# Patient Record
Sex: FEMALE | Race: WHITE | NOT HISPANIC OR LATINO | URBAN - METROPOLITAN AREA
[De-identification: names, ages, dates, MRNs, and addresses within clinical notes are randomized per-mention and may not be internally consistent; named-entity substitution may affect disease eponyms.]

---

## 2018-12-26 ENCOUNTER — EMERGENCY (EMERGENCY)
Facility: HOSPITAL | Age: 83
LOS: 1 days | Discharge: HOME | End: 2018-12-26
Attending: EMERGENCY MEDICINE
Payer: MEDICARE

## 2018-12-26 VITALS
HEIGHT: 64 IN | RESPIRATION RATE: 18 BRPM | HEART RATE: 87 BPM | WEIGHT: 106.92 LBS | DIASTOLIC BLOOD PRESSURE: 81 MMHG | TEMPERATURE: 98 F | SYSTOLIC BLOOD PRESSURE: 128 MMHG | OXYGEN SATURATION: 96 %

## 2018-12-26 PROCEDURE — 99284 EMERGENCY DEPT VISIT MOD MDM: CPT

## 2018-12-26 NOTE — ED ADULT TRIAGE NOTE - CHIEF COMPLAINT QUOTE
right foot pain x 3 weeks and worsening with pain and swelling; no injuries; seen in urgent care; xray reportedly negative; doppler reportedly negative for clots; comes in due to worsening pain; unable to walk

## 2018-12-27 VITALS
OXYGEN SATURATION: 97 % | TEMPERATURE: 97 F | HEART RATE: 76 BPM | DIASTOLIC BLOOD PRESSURE: 63 MMHG | RESPIRATION RATE: 16 BRPM | SYSTOLIC BLOOD PRESSURE: 139 MMHG

## 2018-12-27 PROCEDURE — 93971 EXTREMITY STUDY: CPT | Mod: 26

## 2018-12-27 PROCEDURE — 99284 EMERGENCY DEPT VISIT MOD MDM: CPT | Mod: 25

## 2018-12-27 PROCEDURE — 93971 EXTREMITY STUDY: CPT

## 2018-12-27 RX ORDER — CEPHALEXIN 500 MG
1 CAPSULE ORAL
Qty: 40 | Refills: 0
Start: 2018-12-27 | End: 2019-01-05

## 2018-12-27 RX ORDER — CEPHALEXIN 500 MG
500 CAPSULE ORAL ONCE
Qty: 0 | Refills: 0 | Status: COMPLETED | OUTPATIENT
Start: 2018-12-27 | End: 2018-12-27

## 2018-12-27 RX ORDER — ACETAMINOPHEN 500 MG
650 TABLET ORAL ONCE
Qty: 0 | Refills: 0 | Status: COMPLETED | OUTPATIENT
Start: 2018-12-27 | End: 2018-12-27

## 2018-12-27 RX ADMIN — Medication 500 MILLIGRAM(S): at 00:48

## 2018-12-27 RX ADMIN — Medication 650 MILLIGRAM(S): at 00:48

## 2018-12-27 NOTE — ED PROVIDER NOTE - MEDICAL DECISION MAKING DETAILS
erythema, swelling, us, abx Swelling with erythema of right lower extremity.  No systemic constitutional symptoms. Patient has no DVT risk factors. no s/s of PE.  Well appearing.  Patient started on  Keflex antibiotics for possible cellulitis. She has no MRSA risk factors and cellulitis is non-purulent.  Ultrasound negative for DVT. Patient is ambulating around the ear. Appear safe to be discharged. Patient was discharged with instructions to  Take a course of Keflex, Motrin and Tylenol for pain, and follow up with PMD in 1-2 days for repeat evaluation and further management.    The patient was discharged from the ED in stable condition. All results of today's workup were discussed with the patient and all questions/concerns were addressed. All discharge instructions were thoroughly discussed with the patient, as well as important warning signs and new/ worsening symptoms which should necessitate patient's immediate return to the ED. The patient is agreeable with discharge and expresses full understanding of all instructions given.

## 2018-12-27 NOTE — ED ADULT NURSE NOTE - OBJECTIVE STATEMENT
86 y/o female presents to ED via wheelchair, a&ox3 c/o foot pain. Patient reports swelling to right foot has been getting progressively worse for the past 1-2 weeks, more so within the past 2 days. Also c/o worsening redness and pain. States pain is mild at rest but increased with movement or bearing weight on foot. Patient went to PCP and received x-ray/doppler of extremity to r/o clot which was negative. Denies N/V, fever, SOB, CP, cough, recent travel or any other complaints. Right foot appears red, warm to touch. +pedal pulses, +sensation. Full ROM in extremity.  MD at bedside for eval.

## 2018-12-27 NOTE — ED PROVIDER NOTE - OBJECTIVE STATEMENT
86 yo F presents with 1-2 weeks of RLE swelling with 2 days of increased swelling associated with erythema and warmth to touch. mild pain at rest, increased with any use of her foot. no injury. feels like the area of warmth, erythema is increased in size. 2 weeks ago, at the start of the symptoms, she had xray and US which were negative for fx or dvt.  no f/ch, cp, sob, abd pain, N/V/D.

## 2018-12-27 NOTE — ED PROVIDER NOTE - SKIN, MLM
varicose veins of bl LE. diameter of RLE is 1.5 that of LLE. compartments are soft. there is 1+ pitting edema of RLE. mild erythema and warmth to touch of entire R foot extending to very distal tib/fib area. 2+  palpable DP pulses in bl LE. neurovascular status is normal. . extremely dry cracked skin. varicose veins of bl LE. diameter of RLE is 1.5 that of LLE. compartments are soft. there is 1+ pitting edema of RLE. mild erythema and warmth to touch of entire R foot extending to very distal tib/fib area. 2+  palpable DP pulses in bl LE. neurovascular status is normal. .

## 2018-12-27 NOTE — ED ADULT NURSE NOTE - NSIMPLEMENTINTERV_GEN_ALL_ED
Implemented All Fall Risk Interventions:  [place] to call system. Call bell, personal items and telephone within reach. Instruct patient to call for assistance. Room bathroom lighting operational. Non-slip footwear when patient is off stretcher. Physically safe environment: no spills, clutter or unnecessary equipment. Stretcher in lowest position, wheels locked, appropriate side rails in place. Provide visual cue, wrist band, yellow gown, etc. Monitor gait and stability. Monitor for mental status changes and reorient to person, place, and time. Review medications for side effects contributing to fall risk. Reinforce activity limits and safety measures with patient and family.

## 2018-12-27 NOTE — ED PROVIDER NOTE - NSFOLLOWUPINSTRUCTIONS_ED_ALL_ED_FT
Take antibiotic as prescribed. Follow-up with your primary doctor within 1-2 days. Return to an ER for any concerns.

## 2019-01-07 PROBLEM — Z00.00 ENCOUNTER FOR PREVENTIVE HEALTH EXAMINATION: Status: ACTIVE | Noted: 2019-01-07

## 2019-01-09 ENCOUNTER — APPOINTMENT (OUTPATIENT)
Dept: ORTHOPEDIC SURGERY | Facility: CLINIC | Age: 84
End: 2019-01-09
Payer: MEDICARE

## 2019-01-09 DIAGNOSIS — M79.604 PAIN IN RIGHT LEG: ICD-10-CM

## 2019-01-09 DIAGNOSIS — I99.8 OTHER DISORDER OF CIRCULATORY SYSTEM: ICD-10-CM

## 2019-01-09 DIAGNOSIS — G90.521 COMPLEX REGIONAL PAIN SYNDROME I OF RIGHT LOWER LIMB: ICD-10-CM

## 2019-01-09 PROCEDURE — 99203 OFFICE O/P NEW LOW 30 MIN: CPT

## 2019-01-09 PROCEDURE — 73630 X-RAY EXAM OF FOOT: CPT | Mod: LT

## 2019-01-09 PROCEDURE — 73590 X-RAY EXAM OF LOWER LEG: CPT | Mod: LT

## 2019-01-09 NOTE — DISCUSSION/SUMMARY
[de-identified] : Discussed with patient and family member potential nature of condition, course / sequelae, options reviewed.  NB shoe wear, low impact activity, Pain Management referral for evaluation and treatment, as well as vascular surgery assessment.  Educational handout provided.  Return to office PRN, all questions answered.

## 2019-01-09 NOTE — PHYSICAL EXAM
[de-identified] : Extremity: +Equinus (releases) R LE, skin intact without trophic changes nor erythema R LE, diffuse skin hypersensitivity R distal leg and foot.  Nontender R pretibial, syndesmosis, Achilles, ankle.  Calves soft and nontender, residual dysesthesias R LE, absent pedal pulses R LE.  AOx3, mood / affect normal. [de-identified] : MRI R foot (12/31/18 by report): impression: diffuse soft tissue edema is present, predominantly on the dorsum of the foot. Degenerative changes, without evidence for discrete fractures. Radiographs (3v R foot and 2v R tibia / fibula) reveal vascular calcifications R LE, Ureña's R foot, plantar calcaneal enthesophyte R hindfoot.

## 2019-01-09 NOTE — HISTORY OF PRESENT ILLNESS
[Sneakers] : anderson [[email or web address]] : 87 year female presents for evaluation of R foot/ankle pain x 10/24/18. Pt denies any recent injuries to R foot/ankle. Pt had seen podiatrist who ordered xrays and US which was negative. Pt went to ER where she was treated with oral abx. Pt had seen her PCP who ordered MRI which was negative and referred her. Pt states the pain is generalized around R foot/ankle/distal leg. Pt reports redness/warmth on R distal LE as well as associated soft tissue swelling, and states antecedent history of R knee fracture (May 2018 in [place]) which was treated non-operatively. Pt currently have no pain today. Pt denies any numbness/tingling. Pt limps with ambulation. Pt is using cane for ambulation. Denies additional musculoskeletal complaints referable to foot/ankle.\par

## 2020-03-03 ENCOUNTER — INPATIENT (INPATIENT)
Facility: HOSPITAL | Age: 85
LOS: 1 days | Discharge: HOME | DRG: 206 | End: 2020-03-05
Attending: HOSPITALIST | Admitting: HOSPITALIST
Payer: MEDICARE

## 2020-03-03 VITALS
WEIGHT: 115.08 LBS | HEART RATE: 79 BPM | DIASTOLIC BLOOD PRESSURE: 82 MMHG | RESPIRATION RATE: 18 BRPM | OXYGEN SATURATION: 93 % | TEMPERATURE: 98 F | SYSTOLIC BLOOD PRESSURE: 141 MMHG | HEIGHT: 63 IN

## 2020-03-03 DIAGNOSIS — Z98.890 OTHER SPECIFIED POSTPROCEDURAL STATES: Chronic | ICD-10-CM

## 2020-03-03 DIAGNOSIS — J18.9 PNEUMONIA, UNSPECIFIED ORGANISM: ICD-10-CM

## 2020-03-03 LAB
ALBUMIN SERPL ELPH-MCNC: 3.3 G/DL — SIGNIFICANT CHANGE UP (ref 3.3–5)
ALP SERPL-CCNC: 76 U/L — SIGNIFICANT CHANGE UP (ref 40–120)
ALT FLD-CCNC: 10 U/L — SIGNIFICANT CHANGE UP (ref 10–45)
ANION GAP SERPL CALC-SCNC: 12 MMOL/L — SIGNIFICANT CHANGE UP (ref 5–17)
APTT BLD: 27.9 SEC — SIGNIFICANT CHANGE UP (ref 27.5–36.3)
AST SERPL-CCNC: 15 U/L — SIGNIFICANT CHANGE UP (ref 10–40)
BASE EXCESS BLDV CALC-SCNC: 1.4 MMOL/L — SIGNIFICANT CHANGE UP (ref -2–2)
BASOPHILS # BLD AUTO: 0.04 K/UL — SIGNIFICANT CHANGE UP (ref 0–0.2)
BASOPHILS NFR BLD AUTO: 0.5 % — SIGNIFICANT CHANGE UP (ref 0–2)
BILIRUB SERPL-MCNC: 0.3 MG/DL — SIGNIFICANT CHANGE UP (ref 0.2–1.2)
BUN SERPL-MCNC: 18 MG/DL — SIGNIFICANT CHANGE UP (ref 7–23)
CA-I SERPL-SCNC: 1.25 MMOL/L — SIGNIFICANT CHANGE UP (ref 1.12–1.3)
CALCIUM SERPL-MCNC: 8.9 MG/DL — SIGNIFICANT CHANGE UP (ref 8.4–10.5)
CHLORIDE BLDV-SCNC: 108 MMOL/L — SIGNIFICANT CHANGE UP (ref 96–108)
CHLORIDE SERPL-SCNC: 106 MMOL/L — SIGNIFICANT CHANGE UP (ref 96–108)
CO2 BLDV-SCNC: 27 MMOL/L — SIGNIFICANT CHANGE UP (ref 22–30)
CO2 SERPL-SCNC: 22 MMOL/L — SIGNIFICANT CHANGE UP (ref 22–31)
CREAT SERPL-MCNC: 0.54 MG/DL — SIGNIFICANT CHANGE UP (ref 0.5–1.3)
EOSINOPHIL # BLD AUTO: 0.55 K/UL — HIGH (ref 0–0.5)
EOSINOPHIL NFR BLD AUTO: 6.7 % — HIGH (ref 0–6)
GAS PNL BLDV: 138 MMOL/L — SIGNIFICANT CHANGE UP (ref 135–145)
GAS PNL BLDV: SIGNIFICANT CHANGE UP
GAS PNL BLDV: SIGNIFICANT CHANGE UP
GLUCOSE BLDV-MCNC: 95 MG/DL — SIGNIFICANT CHANGE UP (ref 70–99)
GLUCOSE SERPL-MCNC: 97 MG/DL — SIGNIFICANT CHANGE UP (ref 70–99)
HCO3 BLDV-SCNC: 25 MMOL/L — SIGNIFICANT CHANGE UP (ref 21–29)
HCT VFR BLD CALC: 38.9 % — SIGNIFICANT CHANGE UP (ref 34.5–45)
HCT VFR BLDA CALC: 40 % — SIGNIFICANT CHANGE UP (ref 39–50)
HGB BLD CALC-MCNC: 13 G/DL — SIGNIFICANT CHANGE UP (ref 11.5–15.5)
HGB BLD-MCNC: 12.2 G/DL — SIGNIFICANT CHANGE UP (ref 11.5–15.5)
IMM GRANULOCYTES NFR BLD AUTO: 0.2 % — SIGNIFICANT CHANGE UP (ref 0–1.5)
INR BLD: 1.08 RATIO — SIGNIFICANT CHANGE UP (ref 0.88–1.16)
LACTATE BLDV-MCNC: 1 MMOL/L — SIGNIFICANT CHANGE UP (ref 0.7–2)
LYMPHOCYTES # BLD AUTO: 1.83 K/UL — SIGNIFICANT CHANGE UP (ref 1–3.3)
LYMPHOCYTES # BLD AUTO: 22.2 % — SIGNIFICANT CHANGE UP (ref 13–44)
MCHC RBC-ENTMCNC: 29.5 PG — SIGNIFICANT CHANGE UP (ref 27–34)
MCHC RBC-ENTMCNC: 31.4 GM/DL — LOW (ref 32–36)
MCV RBC AUTO: 94 FL — SIGNIFICANT CHANGE UP (ref 80–100)
MONOCYTES # BLD AUTO: 1 K/UL — HIGH (ref 0–0.9)
MONOCYTES NFR BLD AUTO: 12.2 % — SIGNIFICANT CHANGE UP (ref 2–14)
NEUTROPHILS # BLD AUTO: 4.79 K/UL — SIGNIFICANT CHANGE UP (ref 1.8–7.4)
NEUTROPHILS NFR BLD AUTO: 58.2 % — SIGNIFICANT CHANGE UP (ref 43–77)
NRBC # BLD: 0 /100 WBCS — SIGNIFICANT CHANGE UP (ref 0–0)
PCO2 BLDV: 40 MMHG — SIGNIFICANT CHANGE UP (ref 35–50)
PH BLDV: 7.42 — SIGNIFICANT CHANGE UP (ref 7.35–7.45)
PLATELET # BLD AUTO: 319 K/UL — SIGNIFICANT CHANGE UP (ref 150–400)
PO2 BLDV: 53 MMHG — HIGH (ref 25–45)
POTASSIUM BLDV-SCNC: 3.8 MMOL/L — SIGNIFICANT CHANGE UP (ref 3.5–5.3)
POTASSIUM SERPL-MCNC: 4 MMOL/L — SIGNIFICANT CHANGE UP (ref 3.5–5.3)
POTASSIUM SERPL-SCNC: 4 MMOL/L — SIGNIFICANT CHANGE UP (ref 3.5–5.3)
PROT SERPL-MCNC: 6.2 G/DL — SIGNIFICANT CHANGE UP (ref 6–8.3)
PROTHROM AB SERPL-ACNC: 12.4 SEC — SIGNIFICANT CHANGE UP (ref 10–12.9)
RAPID RVP RESULT: SIGNIFICANT CHANGE UP
RBC # BLD: 4.14 M/UL — SIGNIFICANT CHANGE UP (ref 3.8–5.2)
RBC # FLD: 14.3 % — SIGNIFICANT CHANGE UP (ref 10.3–14.5)
SAO2 % BLDV: 87 % — SIGNIFICANT CHANGE UP (ref 67–88)
SODIUM SERPL-SCNC: 140 MMOL/L — SIGNIFICANT CHANGE UP (ref 135–145)
WBC # BLD: 8.23 K/UL — SIGNIFICANT CHANGE UP (ref 3.8–10.5)
WBC # FLD AUTO: 8.23 K/UL — SIGNIFICANT CHANGE UP (ref 3.8–10.5)

## 2020-03-03 PROCEDURE — 71045 X-RAY EXAM CHEST 1 VIEW: CPT | Mod: 26

## 2020-03-03 PROCEDURE — 99284 EMERGENCY DEPT VISIT MOD MDM: CPT | Mod: GC

## 2020-03-03 PROCEDURE — 99223 1ST HOSP IP/OBS HIGH 75: CPT

## 2020-03-03 RX ORDER — CEFTRIAXONE 500 MG/1
1000 INJECTION, POWDER, FOR SOLUTION INTRAMUSCULAR; INTRAVENOUS ONCE
Refills: 0 | Status: COMPLETED | OUTPATIENT
Start: 2020-03-03 | End: 2020-03-03

## 2020-03-03 RX ORDER — IBUPROFEN 200 MG
400 TABLET ORAL ONCE
Refills: 0 | Status: COMPLETED | OUTPATIENT
Start: 2020-03-03 | End: 2020-03-04

## 2020-03-03 RX ORDER — ACETAMINOPHEN 500 MG
975 TABLET ORAL ONCE
Refills: 0 | Status: COMPLETED | OUTPATIENT
Start: 2020-03-03 | End: 2020-03-04

## 2020-03-03 RX ORDER — AZITHROMYCIN 500 MG/1
500 TABLET, FILM COATED ORAL ONCE
Refills: 0 | Status: COMPLETED | OUTPATIENT
Start: 2020-03-03 | End: 2020-03-03

## 2020-03-03 RX ADMIN — CEFTRIAXONE 100 MILLIGRAM(S): 500 INJECTION, POWDER, FOR SOLUTION INTRAMUSCULAR; INTRAVENOUS at 21:16

## 2020-03-03 RX ADMIN — AZITHROMYCIN 250 MILLIGRAM(S): 500 TABLET, FILM COATED ORAL at 22:32

## 2020-03-03 NOTE — ED ADULT NURSE REASSESSMENT NOTE - NS ED NURSE REASSESS COMMENT FT1
Report received from JOELLEN Cazares. Zithromax started. VS updated as seen in sunrise. pt awaiting RVP results

## 2020-03-03 NOTE — ED PROVIDER NOTE - OBJECTIVE STATEMENT
General 88 year old female with pmhx tachycardia presents to ED c/o abnormal chest CT. Patient reports h/o cough for past 2 weeks for which she was prescribed Augmentin x 10 days w/out re 88 year old female with pmhx tachycardia presents to ED c/o abnormal chest CT. Patient reports h/o cough for past 2 weeks for which she was prescribed Augmentin x 10 days w/out resolution. She was sent for CT chest today which showed LLL opacity w/ mucus plugging and collapse to bronchioles in LLE and was sent to ED for possible Bronch. She reports cough. Denies fevers, chills,c chest pain, sob, abd pain, n/v

## 2020-03-03 NOTE — ED PROVIDER NOTE - PROGRESS NOTE DETAILS
Pt iv infiltrate while receiving azithro. informed by oh mendiola. Pt w/ pain to left wrist with swelling and erythema. ice applied and will order tylenol  Annel Vela PA-C

## 2020-03-03 NOTE — H&P ADULT - ASSESSMENT
88F c hx TIA, depression, frequent falls, recent PNA s/p 10 day course of augmentin (last dose 4 days ago), pw likely collapsed left lower lung 2/2 mucous plugging

## 2020-03-03 NOTE — H&P ADULT - NSHPREVIEWOFSYSTEMS_GEN_ALL_CORE
REVIEW OF SYSTEMS:  CONSTITUTIONAL: +weakness. No fevers. +chills. No rigors. +weight loss. No night sweats. +poor appetite.  EYES: No visual changes. No eye pain.  ENT: No hearing difficulty. No vertigo. No dysphagia. +sore throat. No Sinusitis/rhinorrhea.   NECK: No pain. No stiffness/rigidity.  CARDIAC: No chest pain. No palpitations. No lightheadedness.  RESPIRATORY: +cough. No SOB. No hemoptysis.  GASTROINTESTINAL: No abdominal pain. No nausea. No vomiting. No hematemesis. No diarrhea. No constipation. No melena. No hematochezia.  GENITOURINARY: No dysuria. No frequency. No hesitancy. No hematuria. No oliguria.  NEUROLOGICAL: No numbness/tingling. No focal weakness. No urinary or fecal incontinence. No headache. +unsteady gait.  BACK: +back pain. No flank pain.  EXTREMITIES: No lower extremity edema. Full ROM. No joint pain.  SKIN: +swelling and erythema over left wrist and forearm. No itching. No other lesions.  PSYCHIATRIC: +depression. No anxiety.  ALLERGIC: No lip swelling. No hives.  All other review of systems is negative unless indicated above.  Unless indicated above, unable to assess ROS 2/2

## 2020-03-03 NOTE — H&P ADULT - NSHPLABSRESULTS_GEN_ALL_CORE
Personally reviewed labs.   Personally reviewed imaging.                           12.2   8.23  )-----------( 319      ( 03 Mar 2020 19:12 )             38.9       03-03    140  |  106  |  18  ----------------------------<  97  4.0   |  22  |  0.54    Ca    8.9      03 Mar 2020 19:12    TPro  6.2  /  Alb  3.3  /  TBili  0.3  /  DBili  x   /  AST  15  /  ALT  10  /  AlkPhos  76  03-03            LIVER FUNCTIONS - ( 03 Mar 2020 19:12 )  Alb: 3.3 g/dL / Pro: 6.2 g/dL / ALK PHOS: 76 U/L / ALT: 10 U/L / AST: 15 U/L / GGT: x             PT/INR - ( 03 Mar 2020 19:12 )   PT: 12.4 sec;   INR: 1.08 ratio         PTT - ( 03 Mar 2020 19:12 )  PTT:27.9 sec

## 2020-03-03 NOTE — H&P ADULT - NSHPPHYSICALEXAM_GEN_ALL_CORE
PHYSICAL EXAM:   GENERAL: Alert. Not confused. No acute distress. +cachectic. Not obese.  HEAD:  Atraumatic. Normocephalic.  EYES: EOMI. PERRLA. Normal conjunctiva/sclera.  ENT: Neck supple. No JVD. Moist oral mucosa. Not edentulous. No thrush.  LYMPH: Normal supraclavicular/cervical lymph nodes.   CARDIAC: Not tachy, Not nicole. Regular rhythm. Not irregularly irregular. S1. S2. No murmur. No rub. No distant heart sounds.  LUNG/CHEST: No wheezes. No rales. +significant rhonchi and rubs on left lung  ABDOMEN: Soft. No tenderness. No distension. No fluid wave. Normal bowel sounds.  BACK: No midline/vertebral tenderness. No flank tenderness.  VASCULAR: +2 b/l radial or ulnar pulses. Palpable DP pulses.  EXTREMITIES:  No clubbing. No cyanosis. No edema. Moving all 4.  NEUROLOGY: A&Ox3. Non-focal exam. Cranial nerves intact. Normal speech. Sensation intact.  PSYCH: Normal behavior. Normal affect.  SKIN: No jaundice. No erythema. No rash/lesion.  Vascular Access:     ICU Vital Signs Last 24 Hrs  T(C): 36.6 (03 Mar 2020 23:14), Max: 36.7 (03 Mar 2020 17:33)  T(F): 97.8 (03 Mar 2020 23:14), Max: 98.1 (03 Mar 2020 17:33)  HR: 60 (03 Mar 2020 23:14) (60 - 79)  BP: 143/69 (03 Mar 2020 23:14) (141/82 - 143/69)  BP(mean): --  ABP: --  ABP(mean): --  RR: 17 (03 Mar 2020 23:14) (17 - 18)  SpO2: 97% (03 Mar 2020 23:14) (93% - 97%)      I&O's Summary

## 2020-03-03 NOTE — ED PROVIDER NOTE - RAPID ASSESSMENT
Jony Jacobson rapid assessment documentation for John Cuellar):    88 year old female presents to the ED s/p out-pt CT which reveals "collapsed lung". Pt recently had left lung PNA with no improvement after multiple antibiotics. At time of presentation to [facility] ED, pt is hypoxic to SpO2 93% but appears comfortable and is in no acute respiratory distress. PE reveals diminished breath sounds to the left base, otherwise clear. Calling now to bring pt back to conduct CXR. Jony Jacobson rapid assessment documentation for John Cuellar):    88 year old female presents to the ED s/p out-pt CT which reveals "collapsed lung". Pt recently had left lung PNA with no improvement after multiple antibiotics. At time of presentation to [facility] ED, pt is hypoxic to SpO2 93% but appears comfortable and is in no acute respiratory distress. PE was limited to pulmonary only, reveals diminished breath sounds to the left base, otherwise clear. Calling now to bring pt back to conduct CXR.    Patient was seen as a QDOC patient, a physical exam was not performed as there is no physical exam room available the patient will be seen and further worked up in the main emergency department and their care will be completed by the main emergency department team.

## 2020-03-03 NOTE — ED ADULT NURSE NOTE - OBJECTIVE STATEMENT
2102 pt 88yf aox4 dx pna and collapsed lll/confirmed w/ ct today at dr verduzco [facility], sent to ed for further eval, treated for pna x 2 wks ago had augmentin w/o relief, denies fever chills, pt able to verbalize concerns w/ family at bedside/pending eval/dispo

## 2020-03-03 NOTE — ED ADULT NURSE REASSESSMENT NOTE - NS ED NURSE REASSESS COMMENT FT1
pts IV infiltrated in L wrist, IV removed. Ice packs applied. pt seen by DARRIUS browning. per pharmacy Zithromax is non caustic to interstitial space. new IV started

## 2020-03-03 NOTE — ED PROVIDER NOTE - PHYSICAL EXAMINATION
CONSTITUTIONAL: Patient is awake, alert and oriented x 3. Patient is well appearing and in no acute distress.  HEAD: NCAT,   NECK: supple, FROM  LUNGS: Diminished breath sound left base otherwise CTA B/L,  HEART: RRR.+S1S2 no murmurs,   ABDOMEN: Soft nd/nt, no rebound or guarding.   EXTREMITY:  FROM upper and lower ext b/l  NEURO: No focal deficits

## 2020-03-03 NOTE — H&P ADULT - PROBLEM SELECTOR PLAN 1
- will get CT chest  - will need pulm consult in AM  - chest PT, mucomyst, nebs  - pt has weak cough, likely contributing to mucous plugging  - f/u CT chest - will get CT chest  - will need pulm consult in AM. outpt PMD requesting possible bronch due to weak cough  - chest PT, mucomyst, nebs  - pt has weak cough, likely contributing to mucous plugging  - f/u CT chest

## 2020-03-03 NOTE — ED PROVIDER NOTE - CLINICAL SUMMARY MEDICAL DECISION MAKING FREE TEXT BOX
88 y old f with no significant past medical issues  failure to thrive since her   couple of y ago ,has been complains of couth ,cold an wheezing for 10 days ,finished 1 week of agmentin with out much improvement ,had outpatient ct chest which was concern for possible pneumothorax ,pt looks fine ,.chest xray no pneumonia ,will obtain blood work ,culture ,admission with pulmonary cons ZR

## 2020-03-03 NOTE — H&P ADULT - HISTORY OF PRESENT ILLNESS
88F c hx TIA, depression, frequent falls, recent PNA s/p 10 day course of augmentin (last dose 4 days ago), pw outpt CT showing incidental partial collapse of left lung.    History obtained from pt and pt's daughter at bedside. Pt had recent left PNA about 2 weeks ago causing productive coughing, for which she completed 10 day course of augmentin. Pt went to see PMD, who ordered a CT chest. CT chest @ [facility] reportedly showed partial collapse, ?LLL opacity, ?mucous plugging. Pt was informed to come into the hospital. Pt reports improvement in her cough. Daughter states pt's cough is very weak and often not enough to bring up mucous. Pt's loud rhonchi has improved. Of note, pt has poor appetite for several years since her  passed away, and has had frequent falls, of around 20, since then. Last fall was 2 days ago, when she had unwitnessed fall in bathroom with head trauma and without LOC. No medical care was sought as this is a frequent occurrence and pt felt ok afterwards. Pt is baseline unsteady on feet, uses a cane and refuses to use a walker.     VS: Tm 98.1, P 79, /82, R 18, 93% RA  In the ED, received azithro, ceftriaxone, tylenol, motrin. Azithromycin infiltrated pt's left hand/wrist IV, causing pain, swelling and erythema.

## 2020-03-04 DIAGNOSIS — F32.9 MAJOR DEPRESSIVE DISORDER, SINGLE EPISODE, UNSPECIFIED: ICD-10-CM

## 2020-03-04 DIAGNOSIS — J98.11 ATELECTASIS: ICD-10-CM

## 2020-03-04 DIAGNOSIS — Z02.9 ENCOUNTER FOR ADMINISTRATIVE EXAMINATIONS, UNSPECIFIED: ICD-10-CM

## 2020-03-04 DIAGNOSIS — R62.7 ADULT FAILURE TO THRIVE: ICD-10-CM

## 2020-03-04 DIAGNOSIS — J20.9 ACUTE BRONCHITIS, UNSPECIFIED: ICD-10-CM

## 2020-03-04 DIAGNOSIS — J18.9 PNEUMONIA, UNSPECIFIED ORGANISM: ICD-10-CM
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
## 2020-03-04 LAB
ALBUMIN SERPL ELPH-MCNC: 3.1 G/DL — LOW (ref 3.3–5)
ALP SERPL-CCNC: 68 U/L — SIGNIFICANT CHANGE UP (ref 40–120)
ALT FLD-CCNC: 9 U/L — LOW (ref 10–45)
ANION GAP SERPL CALC-SCNC: 13 MMOL/L — SIGNIFICANT CHANGE UP (ref 5–17)
AST SERPL-CCNC: 12 U/L — SIGNIFICANT CHANGE UP (ref 10–40)
BASOPHILS # BLD AUTO: 0.06 K/UL — SIGNIFICANT CHANGE UP (ref 0–0.2)
BASOPHILS NFR BLD AUTO: 0.8 % — SIGNIFICANT CHANGE UP (ref 0–2)
BILIRUB SERPL-MCNC: 0.3 MG/DL — SIGNIFICANT CHANGE UP (ref 0.2–1.2)
BLD GP AB SCN SERPL QL: NEGATIVE — SIGNIFICANT CHANGE UP
BUN SERPL-MCNC: 14 MG/DL — SIGNIFICANT CHANGE UP (ref 7–23)
CALCIUM SERPL-MCNC: 8.7 MG/DL — SIGNIFICANT CHANGE UP (ref 8.4–10.5)
CHLORIDE SERPL-SCNC: 106 MMOL/L — SIGNIFICANT CHANGE UP (ref 96–108)
CO2 SERPL-SCNC: 23 MMOL/L — SIGNIFICANT CHANGE UP (ref 22–31)
CREAT SERPL-MCNC: 0.55 MG/DL — SIGNIFICANT CHANGE UP (ref 0.5–1.3)
EOSINOPHIL # BLD AUTO: 0.53 K/UL — HIGH (ref 0–0.5)
EOSINOPHIL NFR BLD AUTO: 6.9 % — HIGH (ref 0–6)
GLUCOSE SERPL-MCNC: 78 MG/DL — SIGNIFICANT CHANGE UP (ref 70–99)
HCT VFR BLD CALC: 37.1 % — SIGNIFICANT CHANGE UP (ref 34.5–45)
HGB BLD-MCNC: 11.7 G/DL — SIGNIFICANT CHANGE UP (ref 11.5–15.5)
IMM GRANULOCYTES NFR BLD AUTO: 0.3 % — SIGNIFICANT CHANGE UP (ref 0–1.5)
LYMPHOCYTES # BLD AUTO: 1.76 K/UL — SIGNIFICANT CHANGE UP (ref 1–3.3)
LYMPHOCYTES # BLD AUTO: 22.9 % — SIGNIFICANT CHANGE UP (ref 13–44)
MAGNESIUM SERPL-MCNC: 2 MG/DL — SIGNIFICANT CHANGE UP (ref 1.6–2.6)
MCHC RBC-ENTMCNC: 29.7 PG — SIGNIFICANT CHANGE UP (ref 27–34)
MCHC RBC-ENTMCNC: 31.5 GM/DL — LOW (ref 32–36)
MCV RBC AUTO: 94.2 FL — SIGNIFICANT CHANGE UP (ref 80–100)
MONOCYTES # BLD AUTO: 0.88 K/UL — SIGNIFICANT CHANGE UP (ref 0–0.9)
MONOCYTES NFR BLD AUTO: 11.5 % — SIGNIFICANT CHANGE UP (ref 2–14)
NEUTROPHILS # BLD AUTO: 4.42 K/UL — SIGNIFICANT CHANGE UP (ref 1.8–7.4)
NEUTROPHILS NFR BLD AUTO: 57.6 % — SIGNIFICANT CHANGE UP (ref 43–77)
NRBC # BLD: 0 /100 WBCS — SIGNIFICANT CHANGE UP (ref 0–0)
PHOSPHATE SERPL-MCNC: 3.9 MG/DL — SIGNIFICANT CHANGE UP (ref 2.5–4.5)
PLATELET # BLD AUTO: 294 K/UL — SIGNIFICANT CHANGE UP (ref 150–400)
POTASSIUM SERPL-MCNC: 3.8 MMOL/L — SIGNIFICANT CHANGE UP (ref 3.5–5.3)
POTASSIUM SERPL-SCNC: 3.8 MMOL/L — SIGNIFICANT CHANGE UP (ref 3.5–5.3)
PROCALCITONIN SERPL-MCNC: 0.03 NG/ML — SIGNIFICANT CHANGE UP (ref 0.02–0.1)
PROT SERPL-MCNC: 5.9 G/DL — LOW (ref 6–8.3)
RBC # BLD: 3.94 M/UL — SIGNIFICANT CHANGE UP (ref 3.8–5.2)
RBC # FLD: 14.1 % — SIGNIFICANT CHANGE UP (ref 10.3–14.5)
RH IG SCN BLD-IMP: POSITIVE — SIGNIFICANT CHANGE UP
SODIUM SERPL-SCNC: 142 MMOL/L — SIGNIFICANT CHANGE UP (ref 135–145)
WBC # BLD: 7.67 K/UL — SIGNIFICANT CHANGE UP (ref 3.8–10.5)
WBC # FLD AUTO: 7.67 K/UL — SIGNIFICANT CHANGE UP (ref 3.8–10.5)

## 2020-03-04 PROCEDURE — 99223 1ST HOSP IP/OBS HIGH 75: CPT | Mod: GC

## 2020-03-04 PROCEDURE — 71250 CT THORAX DX C-: CPT | Mod: 26

## 2020-03-04 PROCEDURE — 99233 SBSQ HOSP IP/OBS HIGH 50: CPT

## 2020-03-04 PROCEDURE — 70450 CT HEAD/BRAIN W/O DYE: CPT | Mod: 26

## 2020-03-04 RX ORDER — IBUPROFEN 200 MG
400 TABLET ORAL EVERY 8 HOURS
Refills: 0 | Status: DISCONTINUED | OUTPATIENT
Start: 2020-03-04 | End: 2020-03-05

## 2020-03-04 RX ORDER — CITALOPRAM 10 MG/1
20 TABLET, FILM COATED ORAL DAILY
Refills: 0 | Status: DISCONTINUED | OUTPATIENT
Start: 2020-03-04 | End: 2020-03-05

## 2020-03-04 RX ORDER — IPRATROPIUM/ALBUTEROL SULFATE 18-103MCG
3 AEROSOL WITH ADAPTER (GRAM) INHALATION EVERY 8 HOURS
Refills: 0 | Status: DISCONTINUED | OUTPATIENT
Start: 2020-03-04 | End: 2020-03-05

## 2020-03-04 RX ORDER — METOPROLOL TARTRATE 50 MG
12.5 TABLET ORAL DAILY
Refills: 0 | Status: DISCONTINUED | OUTPATIENT
Start: 2020-03-04 | End: 2020-03-05

## 2020-03-04 RX ORDER — METOPROLOL TARTRATE 50 MG
0 TABLET ORAL
Qty: 0 | Refills: 0 | DISCHARGE

## 2020-03-04 RX ORDER — ACETAMINOPHEN 500 MG
1000 TABLET ORAL ONCE
Refills: 0 | Status: COMPLETED | OUTPATIENT
Start: 2020-03-04 | End: 2020-03-04

## 2020-03-04 RX ORDER — ACETYLCYSTEINE 200 MG/ML
5 VIAL (ML) MISCELLANEOUS THREE TIMES A DAY
Refills: 0 | Status: COMPLETED | OUTPATIENT
Start: 2020-03-04 | End: 2020-03-04

## 2020-03-04 RX ORDER — IPRATROPIUM/ALBUTEROL SULFATE 18-103MCG
3 AEROSOL WITH ADAPTER (GRAM) INHALATION EVERY 6 HOURS
Refills: 0 | Status: DISCONTINUED | OUTPATIENT
Start: 2020-03-04 | End: 2020-03-04

## 2020-03-04 RX ORDER — IBUPROFEN 200 MG
400 TABLET ORAL ONCE
Refills: 0 | Status: COMPLETED | OUTPATIENT
Start: 2020-03-04 | End: 2020-03-04

## 2020-03-04 RX ADMIN — Medication 3 MILLILITER(S): at 22:40

## 2020-03-04 RX ADMIN — Medication 400 MILLIGRAM(S): at 23:42

## 2020-03-04 RX ADMIN — Medication 5 MILLILITER(S): at 15:17

## 2020-03-04 RX ADMIN — Medication 3 MILLILITER(S): at 05:43

## 2020-03-04 RX ADMIN — Medication 12.5 MILLIGRAM(S): at 18:05

## 2020-03-04 RX ADMIN — Medication 5 MILLILITER(S): at 22:40

## 2020-03-04 RX ADMIN — CITALOPRAM 20 MILLIGRAM(S): 10 TABLET, FILM COATED ORAL at 18:06

## 2020-03-04 RX ADMIN — Medication 975 MILLIGRAM(S): at 00:12

## 2020-03-04 RX ADMIN — Medication 400 MILLIGRAM(S): at 19:55

## 2020-03-04 RX ADMIN — Medication 400 MILLIGRAM(S): at 13:45

## 2020-03-04 RX ADMIN — Medication 400 MILLIGRAM(S): at 12:53

## 2020-03-04 RX ADMIN — Medication 400 MILLIGRAM(S): at 19:32

## 2020-03-04 RX ADMIN — Medication 3 MILLILITER(S): at 15:17

## 2020-03-04 RX ADMIN — Medication 400 MILLIGRAM(S): at 00:12

## 2020-03-04 NOTE — PROGRESS NOTE ADULT - ASSESSMENT
88F c hx TIA, depression, frequent falls, recent PNA s/p 10 day course of augmentin (last dose 4 days ago), pw atelectasis left lower lung 2/2 mucous plugging

## 2020-03-04 NOTE — PHYSICAL THERAPY INITIAL EVALUATION ADULT - PERTINENT HX OF CURRENT PROBLEM, REHAB EVAL
Pt is a 88 year old female with pmhx Tachycardia.--- Presents to ED c/o abnormal chest CT. Patient reports h/o cough for past 2 weeks for which she was prescribed Augmentin x 10 days w/out resolution. She was sent for CT chest today which showed LLL opacity w/ mucus plugging and collapse to bronchioles in LLE and was sent to ED for possible Bronch. She reports cough. (-) Head CT 3/4/2020. Continued below.

## 2020-03-04 NOTE — PROGRESS NOTE ADULT - PROBLEM SELECTOR PLAN 1
CT chest noted; await pulm eval.  - chest PT, mucomyst, nebs  - pt has weak cough, likely contributing to mucous plugging

## 2020-03-04 NOTE — PHYSICAL THERAPY INITIAL EVALUATION ADULT - PRECAUTIONS/LIMITATIONS, REHAB EVAL
+Chest CT 3/3/2020: Mucus/secretions within several bronchi in the left lower lobe with marked atelectasis of the left lower lobe. +CXR 3/3/2020: Left lower lobe opacity./fall precautions

## 2020-03-04 NOTE — PROGRESS NOTE ADULT - SUBJECTIVE AND OBJECTIVE BOX
Patient is a 88y old  Female who presents with a chief complaint of partial collapsed left lung (03 Mar 2020 23:40)      INTERVAL History of Present Illness/OVERNIGHT EVENTS: pulm consult pending  wet cough but no SOB    MEDICATIONS  (STANDING):  acetylcysteine 10%  Inhalation 5 milliLiter(s) Inhalation three times a day  albuterol/ipratropium for Nebulization 3 milliLiter(s) Nebulizer every 8 hours  citalopram 20 milliGRAM(s) Oral daily  metoprolol succinate ER 12.5 milliGRAM(s) Oral daily    MEDICATIONS  (PRN):  ibuprofen  Tablet. 400 milliGRAM(s) Oral every 8 hours PRN Moderate Pain (4 - 6)      Allergies    No Known Allergies    Intolerances        REVIEW OF SYSTEMS:  Negative unless otherwise specified above.    Vital Signs Last 24 Hrs  T(C): 36.6 (04 Mar 2020 10:45), Max: 36.7 (03 Mar 2020 17:33)  T(F): 97.8 (04 Mar 2020 10:45), Max: 98.1 (03 Mar 2020 17:33)  HR: 61 (04 Mar 2020 10:45) (60 - 79)  BP: 105/54 (04 Mar 2020 10:45) (105/54 - 143/69)  BP(mean): --  RR: 18 (04 Mar 2020 10:45) (17 - 18)  SpO2: 98% (04 Mar 2020 10:45) (93% - 98%)    Height (cm): 160.02 (03-03 @ 17:33)  Weight (kg): 52.2 (03-03 @ 17:33)  BMI (kg/m2): 20.4 (03-03 @ 17:33)  BSA (m2): 1.53 (03-03 @ 17:33)    PHYSICAL EXAM:  GENERAL: No apparent distress, appears stated age, frail  HEAD:  Atraumatic, Normocephalic  EYES: Conjunctiva and sclera clear, no discharge  ENMT: Moist mucous membranes, no nasal discharge  NECK: Supple, no JVD  CHEST/LUNG: basilar rales, no wheeze  HEART: Regular rhythm, no rubs or gallops  ABDOMEN: Soft, Nontender, Nondistended; Bowel sounds present  EXTREMITIES:  No clubbing, cyanosis or edema  SKIN: No rash or new discoloration  NERVOUS SYSTEM:  Alert & Oriented; Bilateral Lower extremity mobile, sensation to light touch intact      LABS:                        11.7   7.67  )-----------( 294      ( 04 Mar 2020 11:02 )             37.1     04 Mar 2020 11:02    142    |  106    |  14     ----------------------------<  78     3.8     |  23     |  0.55     Ca    8.7        04 Mar 2020 11:02  Phos  3.9       04 Mar 2020 11:02  Mg     2.0       04 Mar 2020 11:02    TPro  5.9    /  Alb  3.1    /  TBili  0.3    /  DBili  x      /  AST  12     /  ALT  9      /  AlkPhos  68     04 Mar 2020 11:02    PT/INR - ( 03 Mar 2020 19:12 )   PT: 12.4 sec;   INR: 1.08 ratio         PTT - ( 03 Mar 2020 19:12 )  PTT:27.9 sec    CAPILLARY BLOOD GLUCOSE          RADIOLOGY & ADDITIONAL TESTS:    Images reviewed personally    Consultant Notes Reviewed and Care Discussed with relevant Consultants/Other Providers.

## 2020-03-04 NOTE — ED ADULT NURSE REASSESSMENT NOTE - NS ED NURSE REASSESS COMMENT FT1
pillow provided to pt. pts L wrist is significantly improved, only mild swelling noted at this time. ice to be re applied

## 2020-03-04 NOTE — CONSULT NOTE ADULT - SUBJECTIVE AND OBJECTIVE BOX
Mrs. Martínez is a pleasant 88 year old  female w/ PMH Depression, Frequent Falls, ?Tachycardia (on beta blocker, no AF), ?Cardiac Aneurysm (was on Coumadin, off few years), and TIAs p/w abnormal CT chest imaging done by PCP for nonresolving viral PNA. As per daughter at bedside, 2 weeks ago patient began c/o of sinus pain, went to urgent care, given supportive therapy - then developed coarse breath sounds and labored breathing the next day and hence saw her PCP, ordered CXR imaging, and diagnosed the patient w/ LLL PNA. Patient was begun on Augmentin, completed 5 days but didn't improve and hence was told to complete 5 more days. On last follow up, patient was still symptomatic and that is when a CT chest was ordered showing LLL collapse. However clinically patient's symptoms had already improved and today patient is in NAD, denies any fever, productive cough, malaise or any other complaints - taken off 2 L NC and is saturating 93% on RA.    PAST MEDICAL & SURGICAL HISTORY:  Bladder disorder: dropped bladder  Heart rate fast  Stroke  H/O hernia repair    FAMILY HISTORY:  No pertinent family history in first degree relatives; No Hx of Lung CA    SOCIAL HISTORY:  x 3 years, was retired in [place], moved back to [place] to be w/ family; no Hx of smoking, alcohol, or illicit drug use.     Allergies No Known Allergies    No Intolerances    HOME MEDICATIONS:  Home Medications:  Aspirin Enteric Coated 81 mg oral delayed release tablet: 1 tab(s) orally once a day (04 Mar 2020 01:36)  CeleXA 20 mg oral tablet: 0.5 tab(s) orally once a day (04 Mar 2020 01:36)  Toprol-XL 25 mg oral tablet, extended release: 0.5 tab(s) orally once a day (04 Mar 2020 01:36)    REVIEW OF SYSTEMS:  Constitutional: [ ] negative [ ] fevers [ ] chills [ ] weight loss [ ] weight gain  HEENT: [ ] negative [ ] dry eyes [ ] eye irritation [ ] postnasal drip [ ] nasal congestion  CV: [ ] negative  [ ] chest pain [ ] orthopnea [ ] palpitations [ ] murmur  Resp: [ ] negative [x] cough [ ] shortness of breath [ ] dyspnea [ ] wheezing [ ] sputum [ ] hemoptysis  GI: [ ] negative [ ] nausea [ ] vomiting [ ] diarrhea [ ] constipation [ ] abd pain [ ] dysphagia   : [ ] negative [ ] dysuria [ ] nocturia [ ] hematuria [ ] increased urinary frequency  Musculoskeletal: [ ] negative [ ] back pain [ ] myalgias [ ] arthralgias [ ] fracture  Skin: [ ] negative [ ] rash [ ] itch  Neurological: [ ] negative [ ] headache [ ] dizziness [ ] syncope [ ] weakness [ ] numbness  Psychiatric: [ ] negative [ ] anxiety [ ] depression  Endocrine: [ ] negative [ ] diabetes [ ] thyroid problem  Hematologic/Lymphatic: [ ] negative [ ] anemia [ ] bleeding problem  Allergic/Immunologic: [ ] negative [ ] itchy eyes [ ] nasal discharge [ ] hives [ ] angioedema  [x] All other systems negative  [ ] Unable to assess ROS because ________    OBJECTIVE:  ICU Vital Signs Last 24 Hrs  T(C): 36.6 (04 Mar 2020 10:45), Max: 36.7 (03 Mar 2020 17:33)  T(F): 97.8 (04 Mar 2020 10:45), Max: 98.1 (03 Mar 2020 17:33)  HR: 61 (04 Mar 2020 10:45) (60 - 79)  BP: 105/54 (04 Mar 2020 10:45) (105/54 - 143/69)  RR: 18 (04 Mar 2020 10:45) (17 - 18)  SpO2: 98% (04 Mar 2020 10:45) (93% - 98%)    PHYSICAL EXAM:  General: A&Ox3; NAD  Head: NC/AT; PERRL; EOMI; anicteric sclera  Neck: Supple; no JVD  Respiratory: CTA B/L; Good air movement, LLL rhonchi, prolonged expiration   Cardiovascular: Regular rhythm/rate; S1/S2; no gallops or murmurs auscultated  Gastrointestinal: Soft; NTND w/out rebound tenderness or guarding; bowel sounds normal  Extremities: WWP; no edema or cyanosis; radial/pedal pulses palpable  Neurological: CNII-XII grossly intact; no obvious focal deficits  Skin: intact    HOSPITAL MEDICATIONS:  Standing Meds:  acetylcysteine 10%  Inhalation 5 milliLiter(s) Inhalation three times a day  albuterol/ipratropium for Nebulization 3 milliLiter(s) Nebulizer every 8 hours  citalopram 20 milliGRAM(s) Oral daily  metoprolol succinate ER 12.5 milliGRAM(s) Oral daily    PRN Meds:  ibuprofen  Tablet. 400 milliGRAM(s) Oral every 8 hours PRN    LABS:                        11.7   7.67  )-----------( 294      ( 04 Mar 2020 11:02 )             37.1     Hgb Trend: 11.7<--, 12.2<--  03-04    142  |  106  |  14  ----------------------------<  78  3.8   |  23  |  0.55    Ca    8.7      04 Mar 2020 11:02  Phos  3.9     03-04  Mg     2.0     03-04    TPro  5.9<L>  /  Alb  3.1<L>  /  TBili  0.3  /  DBili  x   /  AST  12  /  ALT  9<L>  /  AlkPhos  68  03-04    Creatinine Trend: 0.55<--, 0.54<--  PT/INR - ( 03 Mar 2020 19:12 )   PT: 12.4 sec;   INR: 1.08 ratio      PTT - ( 03 Mar 2020 19:12 )  PTT:27.9 sec    Venous Blood Gas:  03-03 @ 19:12  7.42/40/53/25/87  VBG Lactate: 1.0    RADIOLOGY:  CT Chest No Cont (03.04.20 @ 01:58)  CT scan of the chest was obtained without administration of intravenous contrast.   No hilar and/or mediastinal adenopathy is noted.   Heart is enlarged in size. Calcification of the coronary arteries is noted. No pericardial effusion is noted.   Mucus is present within several of the bronchi in the left lower lobe. Marked atelectasis of the left lower lobe is noted. Calcified granuloma is noted in the right upper lobe. No pleural effusions are noted.  Below the diaphragm, visualized portions of the abdomen demonstrate small left renal calcification.   Degenerative changes of the spine as well as loss of height of one of the lower thoracic vertebral body is noted.  Impression: Mucus/secretions within several bronchi in the left lower lobe with marked atelectasis of the left lower lobe.  [x] Reviewed and interpreted by me

## 2020-03-04 NOTE — CONSULT NOTE ADULT - ASSESSMENT
Mrs. Martínez is a pleasant 88 year old  female w/ PMH Depression, Frequent Falls, ?Tachycardia (on beta blocker, no AF), ?Cardiac Aneurysm (was on Coumadin, off few years), and TIAs p/w abnormal CT chest imaging done by PCP for nonresolving viral PNA. As per daughter at bedside, 2 weeks ago patient began c/o of sinus pain, went to urgent care, given supportive therapy - then developed coarse breath sounds and labored breathing the next day and hence saw her PCP, ordered CXR imaging, and diagnosed the patient w/ LLL PNA. Patient was begun on Augmentin, completed 5 days but didn't improve and hence was told to complete 5 more days. On last follow up, patient was still symptomatic and that is when a CT chest was ordered showing LLL collapse. However clinically patient's symptoms had already improved and today patient is in NAD, denies any fever, productive cough, malaise or any other complaints - taken off 2 L NC and is saturating 93% on RA.    #LLL Atelectasis  - Likely 2/2 mucus plugging from a viral pneumonia; patient is afebrile, no WBC elevation, and nonproductive adequate cough.  - Recommend conservative measures for airway clearance Duoneb, Mucomyst, ambulate as tolerated, along w/ Acapella   - Currently no indications for bronchoscopy such as mass lesion on CT imaging to explain selective lobe atelectasis  - Would consider repeat CT chest 4-6 weeks as outpatient    INCOMPLETE Mrs. Martínez is a pleasant 88 year old  female w/ PMH Depression, Frequent Falls, ?Tachycardia (on beta blocker, no AF), ?Cardiac Aneurysm (was on Coumadin, off few years), and TIAs p/w abnormal CT chest imaging done by PCP for nonresolving viral PNA. As per daughter at bedside, 2 weeks ago patient began c/o of sinus pain, went to urgent care, given supportive therapy - then developed coarse breath sounds and labored breathing the next day and hence saw her PCP, ordered CXR imaging, and diagnosed the patient w/ LLL PNA. Patient was begun on Augmentin, completed 5 days but didn't improve and hence was told to complete 5 more days. On last follow up, patient was still symptomatic and that is when a CT chest was ordered showing LLL collapse. However clinically patient's symptoms had already improved and today patient is in NAD, denies any fever, productive cough, malaise or any other complaints - taken off 2 L NC and is saturating 93% on RA.    #LLL Atelectasis  - Likely 2/2 mucus plugging from a viral pneumonia; patient is afebrile, no WBC elevation, and nonproductive adequate cough.  - Recommend conservative measures for airway clearance Duoneb, Mucomyst, ambulate as tolerated, along w/ Acapella   - Currently no indications for bronchoscopy such as mass lesion on CT imaging to explain selective lobe atelectasis  - Would consider repeat CT chest 4-6 weeks as outpatient  - Assess oxygen during ambulation with PT please. Mrs. Martínez is a pleasant 88 year old  female w/ PMH Depression, Frequent Falls, ?Tachycardia (on beta blocker, no AF), ?Cardiac Aneurysm (was on Coumadin, off few years), and TIAs p/w abnormal CT chest imaging done by PCP for nonresolving viral PNA. As per daughter at bedside, 2 weeks ago patient began c/o of sinus pain, went to urgent care, given supportive therapy - then developed coarse breath sounds and labored breathing the next day and hence saw her PCP, ordered CXR imaging, and diagnosed the patient w/ LLL PNA. Patient was begun on Augmentin, completed 5 days but didn't improve and hence was told to complete 5 more days. On last follow up, patient was still symptomatic and that is when a CT chest was ordered showing LLL collapse. However clinically patient's symptoms had already improved and today patient is in NAD, denies any fever, productive cough, malaise or any other complaints - taken off 2 L NC and is saturating 93% on RA.    #LLL Atelectasis  - Due to mucus plugging, recent pneumonia; patient is afebrile, no WBC elevation, and nonproductive adequate cough.  - Recommend conservative measures for airway clearance Duoneb, Mucomyst, ambulate as tolerated, along w/ Acapella   - Currently no indications for bronchoscopy such as mass lesion on CT imaging to explain selective lobe atelectasis  - Would consider repeat CT chest 4-6 weeks as outpatient  - Assess oxygen during ambulation with PT please.

## 2020-03-04 NOTE — SWALLOW BEDSIDE ASSESSMENT ADULT - SLP PERTINENT HISTORY OF CURRENT PROBLEM
88F c hx TIA, depression, frequent falls, recent PNA s/p 10 day course of augmentin (last dose 4 days ago), pw outpt CT showing incidental partial collapse of left lung. Daughter states pt's cough is very weak and often not enough to bring up mucous. Pt's loud rhonchi has improved. Of note, pt has poor appetite for several years since her  passed away, and has had frequent falls, of around 20, since then. Last fall was 2 days ago, when she had unwitnessed fall in bathroom with head trauma and without LOC. No medical care was sought as this is a frequent occurrence and pt felt ok afterwards. Pt is baseline unsteady on feet, uses a cane and refuses to use a walker. In the ED, received azithro, ceftriaxone, tylenol, motrin. Azithromycin infiltrated pt's left hand/wrist IV, causing pain, swelling and erythema. Likely collapsed left lower lung 2/2 mucous plugging. Outpt PMD requesting possible bronch due to weak cough,  pt has weak cough, likely contributing to mucous plugging.

## 2020-03-04 NOTE — CONSULT NOTE ADULT - ATTENDING COMMENTS
Patient seen and examined, daughter at bedside. Imaging and labs reviewed.  Lifelong nonsmoker, recently treated for community acquired pneumonia, clinically improving, denies fever, chills, chest pain, dyspnea. Minimally productive cough  CT chest with secretions in LLL bronchi with atelectasis/consolidation  Recommend airway clearance therapy - bronchodilators, acapella device, out of bed and ambulate as tolerated  No indication for bronchoscopy at this time  Pulmonary outpatient follow up in 4-6 weeks with repeat CT chest and PFTs

## 2020-03-04 NOTE — PROGRESS NOTE ADULT - PROBLEM SELECTOR PLAN 6
Transitions of Care Status:  1.  Name of PCP: santi best  2.  PCP Contacted on Admission: [x ] Y    [ ] N  left message  3.  PCP contacted at Discharge: [ ] Y    [ ] N    [ ] N/A  4.  Post-Discharge Appointment Date and Location:  5.  Summary of Handoff given to PCP:

## 2020-03-04 NOTE — PHYSICAL THERAPY INITIAL EVALUATION ADULT - ADDITIONAL COMMENTS
Pt lives with her daughter who has special needs in a apt in a house with 2 steps to enter, no HR and no steps inside. Pt was independent with all ADLs and ambulation PTA. Pt used a cane for ambulation PTA. Pt owns RW. Pt's daughter assists pt with cooking, grocery shopping and commuting to doctors office. Pt with hx of falls. +reading eyeglasses.

## 2020-03-04 NOTE — ED ADULT NURSE REASSESSMENT NOTE - NS ED NURSE REASSESS COMMENT FT1
pt taken to bathroom in wheelchair. no distress noted. Hematoma to L arm appears to be slightly smaller in size

## 2020-03-04 NOTE — PHYSICAL THERAPY INITIAL EVALUATION ADULT - PLANNED THERAPY INTERVENTIONS, PT EVAL
balance training/bed mobility training/strengthening/transfer training/GOAL: Stair Negotiation Training: Patient will be able to negotiate up & down 1 flight of stairs with unilateral rail, step to gait pattern, in 2 weeks./gait training

## 2020-03-05 ENCOUNTER — TRANSCRIPTION ENCOUNTER (OUTPATIENT)
Age: 85
End: 2020-03-05

## 2020-03-05 VITALS — WEIGHT: 114.64 LBS

## 2020-03-05 PROCEDURE — 86901 BLOOD TYPING SEROLOGIC RH(D): CPT

## 2020-03-05 PROCEDURE — 85027 COMPLETE CBC AUTOMATED: CPT

## 2020-03-05 PROCEDURE — 87798 DETECT AGENT NOS DNA AMP: CPT

## 2020-03-05 PROCEDURE — 85014 HEMATOCRIT: CPT

## 2020-03-05 PROCEDURE — 92610 EVALUATE SWALLOWING FUNCTION: CPT

## 2020-03-05 PROCEDURE — 84132 ASSAY OF SERUM POTASSIUM: CPT

## 2020-03-05 PROCEDURE — 71045 X-RAY EXAM CHEST 1 VIEW: CPT

## 2020-03-05 PROCEDURE — 82435 ASSAY OF BLOOD CHLORIDE: CPT

## 2020-03-05 PROCEDURE — 87486 CHLMYD PNEUM DNA AMP PROBE: CPT

## 2020-03-05 PROCEDURE — 82803 BLOOD GASES ANY COMBINATION: CPT

## 2020-03-05 PROCEDURE — 85610 PROTHROMBIN TIME: CPT

## 2020-03-05 PROCEDURE — 96374 THER/PROPH/DIAG INJ IV PUSH: CPT

## 2020-03-05 PROCEDURE — 96375 TX/PRO/DX INJ NEW DRUG ADDON: CPT

## 2020-03-05 PROCEDURE — 99285 EMERGENCY DEPT VISIT HI MDM: CPT | Mod: 25

## 2020-03-05 PROCEDURE — 97161 PT EVAL LOW COMPLEX 20 MIN: CPT

## 2020-03-05 PROCEDURE — 85730 THROMBOPLASTIN TIME PARTIAL: CPT

## 2020-03-05 PROCEDURE — 71250 CT THORAX DX C-: CPT

## 2020-03-05 PROCEDURE — 86850 RBC ANTIBODY SCREEN: CPT

## 2020-03-05 PROCEDURE — 99233 SBSQ HOSP IP/OBS HIGH 50: CPT

## 2020-03-05 PROCEDURE — 87040 BLOOD CULTURE FOR BACTERIA: CPT

## 2020-03-05 PROCEDURE — 82947 ASSAY GLUCOSE BLOOD QUANT: CPT

## 2020-03-05 PROCEDURE — 83605 ASSAY OF LACTIC ACID: CPT

## 2020-03-05 PROCEDURE — 94640 AIRWAY INHALATION TREATMENT: CPT

## 2020-03-05 PROCEDURE — 80053 COMPREHEN METABOLIC PANEL: CPT

## 2020-03-05 PROCEDURE — 70450 CT HEAD/BRAIN W/O DYE: CPT

## 2020-03-05 PROCEDURE — 86900 BLOOD TYPING SEROLOGIC ABO: CPT

## 2020-03-05 PROCEDURE — 84145 PROCALCITONIN (PCT): CPT

## 2020-03-05 PROCEDURE — 84295 ASSAY OF SERUM SODIUM: CPT

## 2020-03-05 PROCEDURE — 84100 ASSAY OF PHOSPHORUS: CPT

## 2020-03-05 PROCEDURE — 82330 ASSAY OF CALCIUM: CPT

## 2020-03-05 PROCEDURE — 87633 RESP VIRUS 12-25 TARGETS: CPT

## 2020-03-05 PROCEDURE — 83735 ASSAY OF MAGNESIUM: CPT

## 2020-03-05 PROCEDURE — 87581 M.PNEUMON DNA AMP PROBE: CPT

## 2020-03-05 RX ORDER — ASPIRIN/CALCIUM CARB/MAGNESIUM 324 MG
1 TABLET ORAL
Qty: 0 | Refills: 0 | DISCHARGE

## 2020-03-05 RX ORDER — ACETYLCYSTEINE 200 MG/ML
600 VIAL (ML) MISCELLANEOUS
Qty: 12000 | Refills: 0
Start: 2020-03-05 | End: 2020-03-14

## 2020-03-05 RX ORDER — IPRATROPIUM/ALBUTEROL SULFATE 18-103MCG
3 AEROSOL WITH ADAPTER (GRAM) INHALATION
Qty: 90 | Refills: 0
Start: 2020-03-05 | End: 2020-03-14

## 2020-03-05 RX ORDER — IBUPROFEN 200 MG
1 TABLET ORAL
Qty: 0 | Refills: 0 | DISCHARGE
Start: 2020-03-05

## 2020-03-05 RX ADMIN — Medication 3 MILLILITER(S): at 06:12

## 2020-03-05 RX ADMIN — Medication 1000 MILLIGRAM(S): at 00:00

## 2020-03-05 RX ADMIN — CITALOPRAM 20 MILLIGRAM(S): 10 TABLET, FILM COATED ORAL at 12:18

## 2020-03-05 RX ADMIN — Medication 12.5 MILLIGRAM(S): at 12:18

## 2020-03-05 NOTE — SWALLOW BEDSIDE ASSESSMENT ADULT - COMMENTS
Hx cont: CAP improving symptoms- lung opacity may be resolving pna. + FTT. 3/4 CT chest: Impression: Mucus/secretions within several bronchi in the left lower lobe with marked atelectasis of the left lower lobe.
Hx cont: CAP improving symptoms- lung opacity may be resolving pna. + FTT. 3/4 CT chest: Impression: Mucus/secretions within several bronchi in the left lower lobe with marked atelectasis of the left lower lobe.

## 2020-03-05 NOTE — SWALLOW BEDSIDE ASSESSMENT ADULT - SWALLOW EVAL: PATIENT/FAMILY GOALS STATEMENT
Pt/family denied hx of dysphagia and/or PNA prior to this hospitalization.
Pt's daughter denies h/o dysphagia. reports PTA and since admittance pt has been tolerating a regular texture diet without difficulty.

## 2020-03-05 NOTE — DISCHARGE NOTE PROVIDER - NSDCHHATTENDCERT_GEN_ALL_CORE
My signature below certifies that the above stated patient is homebound and upon completion of the Face-To-Face encounter, has the need for intermittent skilled nursing, physical therapy and/or speech or occupational therapy services in their home for their current diagnosis as outlined in their initial plan of care. These services will continue to be monitored by myself or another physician. rest/medications/heat

## 2020-03-05 NOTE — DISCHARGE NOTE PROVIDER - HOSPITAL COURSE
88 year old  female w/ PMH Depression, Frequent Falls, ?Tachycardia (on beta blocker, no AF), ?Cardiac Aneurysm (was on Coumadin, off few years), and TIAs p/w abnormal CT chest imaging done by PCP for nonresolving viral PNA, found with LLL Atelectasis due to mucus plugging 2/2 recent PNA. During hospital course, pt remained afebrile, no WBC elevation, and nonproductive adequate cough. Pt seen by Pulm, recommended for conservative measures for airway clearance Duoneb, Mucomyst, ambulate as tolerated, along w/ Acapella. As per Pulm,  no indications for bronchoscopy such as mass lesion on CT imaging to explain selective lobe atelectasis, therefore patient cleared for d/c home, with plan to repeat CT chest 4-6 weeks as outpatient. 88 year old  female w/ PMH Depression, Frequent Falls, ?Tachycardia (on beta blocker, no AF), ?Cardiac Aneurysm (was on Coumadin, off few years), and TIAs p/w abnormal CT chest imaging done by PCP for nonresolving viral PNA, found with LLL Atelectasis due to mucus plugging 2/2 recent PNA. During hospital course, pt remained afebrile, no WBC elevation, and nonproductive adequate cough. Pt seen by Pulm, recommended for conservative measures for airway clearance Duoneb, Mucomyst, ambulate as tolerated, along w/ Acapella. As per Pulm,  no indications for bronchoscopy such as mass lesion on CT imaging to explain selective lobe atelectasis, therefore patient cleared for d/c home, with plan to repeat CT chest 4-6 weeks as outpatient. PCP was informed about hospitalization.    Daughter at bedside - agreeable with plan of care and discharge.

## 2020-03-05 NOTE — DISCHARGE NOTE NURSING/CASE MANAGEMENT/SOCIAL WORK - MODE OF TRANSPORTATION
Wheelchair/Stroller From [facility], visiting family here  Denies tobacco use  Denies EtOH use  Denies illicit drug use

## 2020-03-05 NOTE — DISCHARGE NOTE PROVIDER - CARE PROVIDER_API CALL
Sundar Owen (MD)  Cardiovascular Disease; Internal Medicine  [address] [place] [address], [facility] [[email or web address]] [id number]  [place] [address]  Phone: [phone]  Fax: [phone]  Established Patient  Follow Up Time: 1 week

## 2020-03-05 NOTE — DISCHARGE NOTE PROVIDER - NSDCCPCAREPLAN_GEN_ALL_CORE_FT
PRINCIPAL DISCHARGE DIAGNOSIS  Diagnosis: Mucus plugging of bronchi  Assessment and Plan of Treatment: Cleared by Pulmonologist-- continue with airway clearance Duoneb, Mucomyst, ambulate as tolerated, along w/ Acapella device      SECONDARY DISCHARGE DIAGNOSES  Diagnosis: Atelectasis, left  Assessment and Plan of Treatment: Continue with medications---FOLLOW_UP at Pulmonary clinic in 4-6 weeks for repeat CT chest!

## 2020-03-05 NOTE — SWALLOW BEDSIDE ASSESSMENT ADULT - SLP GENERAL OBSERVATIONS
Patient encountered awake and alert, on RA, positioned upright in bed, A&Ox4.  Daughter present for evaluation.  Pt able to follow commands and make complex wants/needs known.  Vocal quality and speech intelligibility WNL.

## 2020-03-05 NOTE — DIETITIAN INITIAL EVALUATION ADULT. - REASON INDICATOR FOR ASSESSMENT
Pt seen for nutrition consult for failure to thrive. Information obtained from pt and daughter at bedside.     Pt with PMH including TIA, frequent falls, recent pneumonia CT finding Atelectasis of L lower lung due to mucous plugging.

## 2020-03-05 NOTE — DISCHARGE NOTE PROVIDER - NSDCMRMEDTOKEN_GEN_ALL_CORE_FT
Aspirin Enteric Coated 81 mg oral delayed release tablet: 1 tab(s) orally once a day  CeleXA 20 mg oral tablet: 0.5 tab(s) orally once a day  Toprol-XL 25 mg oral tablet, extended release: 0.5 tab(s) orally once a day acetylcysteine 10% inhalation solution: 600 milligram(s) orally 2 times a day   CeleXA 20 mg oral tablet: 0.5 tab(s) orally once a day  ibuprofen 400 mg oral tablet: 1 tab(s) orally every 8 hours, As needed, Moderate Pain (4 - 6)  ipratropium-albuterol 0.5 mg-2.5 mg/3 mLinhalation solution: 3 milliliter(s) inhaled every 8 hours, As Needed for shortness of breath  Toprol-XL 25 mg oral tablet, extended release: 0.5 tab(s) orally once a day

## 2020-03-05 NOTE — SWALLOW BEDSIDE ASSESSMENT ADULT - SLP PRECAUTIONS/LIMITATIONS: HEARING
impaired/Mildly to Moderately Impaired: difficulty hearing in some environments or speaker may need to increase volume or speak distinctly
impaired/Mildly to Moderately Impaired: difficulty hearing in some environments or speaker may need to increase volume or speak distinctly

## 2020-03-05 NOTE — DISCHARGE NOTE NURSING/CASE MANAGEMENT/SOCIAL WORK - NSDCPEPTSTRK_GEN_ALL_CORE
Call 911 for stroke/Stroke support groups for patients, families, and friends/Prescribed medications/Risk factors for stroke/Stroke education booklet/Stroke warning signs and symptoms/Signs and symptoms of stroke/Need for follow up after discharge

## 2020-03-05 NOTE — DISCHARGE NOTE NURSING/CASE MANAGEMENT/SOCIAL WORK - PATIENT PORTAL LINK FT
You can access the FollowMyHealth Patient Portal offered by [facility] by registering at the following website: [email or web address]. By joining [employer]’s FollowMyHealth portal, you will also be able to view your health information using other applications (apps) compatible with our system.

## 2020-03-05 NOTE — PROGRESS NOTE ADULT - PROBLEM SELECTOR PLAN 1
CT chest noted; appreciate pulm eval.  - chest PT, mucomyst, nebs  - pt has weak cough, likely contributing to mucous plugging  - no plan for bronchoscopy

## 2020-03-05 NOTE — DIETITIAN INITIAL EVALUATION ADULT. - PHYSICAL APPEARANCE
Pt appears appropriate for advanced age/other (specify) Ht: 5'3" , Wt: 114.8 lbs, BMI: 20.4 kg/m2, IBW: 115 lbs +/- 10%, %IBW: 100%  1+ R leg edema, Skin free of pressure injury per nursing flowsheets

## 2020-03-05 NOTE — DIETITIAN INITIAL EVALUATION ADULT. - PROBLEM SELECTOR PLAN 1
- will get CT chest  - will need pulm consult in AM. outpt PMD requesting possible bronch due to weak cough  - chest PT, mucomyst, nebs  - pt has weak cough, likely contributing to mucous plugging  - f/u CT chest

## 2020-03-05 NOTE — DIETITIAN INITIAL EVALUATION ADULT. - OTHER INFO
Diet PTA: Per daughter, pt is had a decreased appetite since the passing of her  in 2015, pt previously resided in [place] but ~1 year ago moved back to [place] to live with her family, since her move her intake has been improving with encouragement from her family. Daughter will prepare meals for pt and she usually consumes 2 meals per day and a snack in the afternoon. Pt does not follow any formal dietary restrictions, pt consumes regular textured foods.     Weight: Pt reports UBW of 120 lbs, stated due to decreased appetite her weight had decreased to a low of 80 lbs, stated over the past ~1 year her weight has been steadily increasing to her more recent weight of 115 lbs. Pt with 35 lb wt gain within the past ~1 year.     Pt with no food allergies, pt takes vitamin B12, vitamin D and multivitamin at home. No N+V, last BM yesterday. Pt with no overt difficulty chewing/swallowing, denies coughing with foods/liquids or feeling foods are going down the wrong pipe-pending swallow evaluation.     Diet education: RD provided high calorie, high protein nutrition education to pt and daughter at bedside, reviewed methods to increase caloric density of foods and methods to increase protein in meals.

## 2020-03-05 NOTE — DISCHARGE NOTE PROVIDER - NSFOLLOWUPCLINICS_GEN_ALL_ED_FT
[facility] Pulmonolgy and Sleep Medicine  Pulmonology  [address] [place] [address], [place] [address]  Phone: [phone]  Fax:   Follow Up Time: 1 week [facility] Pulmonolgy and Sleep Medicine  Pulmonology  [address] [place] [address], [place] [address]  Phone: [phone]  Fax:   Follow Up Time: 1 week

## 2020-03-05 NOTE — SWALLOW BEDSIDE ASSESSMENT ADULT - SWALLOW EVAL: DIAGNOSIS
Patient presents with an overtly functional oropharyngeal swallow sequence for a regular texture diet.
Attempted to see pt for bedside swallow evaluation. Pt received in bed. Asleep. Daughter at the bedside requesting to defer swallow assessment at this time to allow pt to sleep. Daughter made aware of role of SLP, rationale for swallow assessment and risk of aspiration. Daughter requesting this service follow up at a later date. This service to follow up tomorrow 3/5 as schedule permits. KYA Sotelo and JOELLEN Mccall made aware.

## 2020-03-05 NOTE — PROGRESS NOTE ADULT - SUBJECTIVE AND OBJECTIVE BOX
Patient is a 88y old  Female who presents with a chief complaint of partial collapsed left lung (04 Mar 2020 14:52)      INTERVAL History of Present Illness/OVERNIGHT EVENTS: feels ok    MEDICATIONS  (STANDING):  albuterol/ipratropium for Nebulization 3 milliLiter(s) Nebulizer every 8 hours  citalopram 20 milliGRAM(s) Oral daily  metoprolol succinate ER 12.5 milliGRAM(s) Oral daily    MEDICATIONS  (PRN):  ibuprofen  Tablet. 400 milliGRAM(s) Oral every 8 hours PRN Moderate Pain (4 - 6)      Allergies    No Known Allergies    Intolerances        REVIEW OF SYSTEMS:  Negative unless otherwise specified above.    Vital Signs Last 24 Hrs  T(C): 36.5 (05 Mar 2020 10:14), Max: 36.9 (05 Mar 2020 06:09)  T(F): 97.7 (05 Mar 2020 10:14), Max: 98.5 (05 Mar 2020 06:09)  HR: 81 (05 Mar 2020 10:14) (61 - 81)  BP: 101/52 (05 Mar 2020 10:14) (101/52 - 128/71)  BP(mean): --  RR: 18 (05 Mar 2020 10:14) (18 - 18)  SpO2: 95% (05 Mar 2020 10:14) (91% - 98%)        PHYSICAL EXAM:  GENERAL: No apparent distress, appears frail  HEAD:  Atraumatic, Normocephalic  EYES: Conjunctiva and sclera clear, no discharge  ENMT: Moist mucous membranes, no nasal discharge  NECK: Supple, no JVD  CHEST/LUNG: Air entry bilaterally, no wheeze, poor effort  HEART: Regular rhythm, no rubs or gallops  ABDOMEN: Soft, Nontender, Nondistended; Bowel sounds present  EXTREMITIES:  No clubbing, cyanosis or edema  SKIN: No rash or new discoloration  NERVOUS SYSTEM:  Alert & Oriented; Bilateral Lower extremity mobile, sensation to light touch intact      LABS:                        11.7   7.67  )-----------( 294      ( 04 Mar 2020 11:02 )             37.1     04 Mar 2020 11:02    142    |  106    |  14     ----------------------------<  78     3.8     |  23     |  0.55     Ca    8.7        04 Mar 2020 11:02  Phos  3.9       04 Mar 2020 11:02  Mg     2.0       04 Mar 2020 11:02    TPro  5.9    /  Alb  3.1    /  TBili  0.3    /  DBili  x      /  AST  12     /  ALT  9      /  AlkPhos  68     04 Mar 2020 11:02    PT/INR - ( 03 Mar 2020 19:12 )   PT: 12.4 sec;   INR: 1.08 ratio         PTT - ( 03 Mar 2020 19:12 )  PTT:27.9 sec    CAPILLARY BLOOD GLUCOSE          RADIOLOGY & ADDITIONAL TESTS:    Images reviewed personally    Consultant Notes Reviewed and Care Discussed with relevant Consultants/Other Providers.

## 2020-03-09 LAB
CULTURE RESULTS: SIGNIFICANT CHANGE UP
CULTURE RESULTS: SIGNIFICANT CHANGE UP
SPECIMEN SOURCE: SIGNIFICANT CHANGE UP
SPECIMEN SOURCE: SIGNIFICANT CHANGE UP

## 2021-04-20 NOTE — PATIENT PROFILE ADULT - NSPROEXTENSIONSOFSELF_GEN_A_NUR
Detail Level: Simple
Price (Do Not Change): 0.00
Instructions: This plan will send the code FBSE to the PM system.  DO NOT or CHANGE the price.
eyeglasses/cane

## 2021-06-15 ENCOUNTER — EMERGENCY (EMERGENCY)
Facility: HOSPITAL | Age: 86
LOS: 1 days | Discharge: HOME | End: 2021-06-15
Attending: EMERGENCY MEDICINE | Admitting: STUDENT IN AN ORGANIZED HEALTH CARE EDUCATION/TRAINING PROGRAM
Payer: MEDICARE

## 2021-06-15 VITALS
DIASTOLIC BLOOD PRESSURE: 59 MMHG | SYSTOLIC BLOOD PRESSURE: 108 MMHG | HEART RATE: 97 BPM | RESPIRATION RATE: 19 BRPM | TEMPERATURE: 98 F | WEIGHT: 119.93 LBS | OXYGEN SATURATION: 97 % | HEIGHT: 63 IN

## 2021-06-15 DIAGNOSIS — Z98.890 OTHER SPECIFIED POSTPROCEDURAL STATES: Chronic | ICD-10-CM

## 2021-06-15 LAB
BASOPHILS # BLD AUTO: 0.03 K/UL — SIGNIFICANT CHANGE UP (ref 0–0.2)
BASOPHILS NFR BLD AUTO: 0.3 % — SIGNIFICANT CHANGE UP (ref 0–2)
EOSINOPHIL # BLD AUTO: 0.24 K/UL — SIGNIFICANT CHANGE UP (ref 0–0.5)
EOSINOPHIL NFR BLD AUTO: 2 % — SIGNIFICANT CHANGE UP (ref 0–6)
HCT VFR BLD CALC: 40.9 % — SIGNIFICANT CHANGE UP (ref 34.5–45)
HGB BLD-MCNC: 13.3 G/DL — SIGNIFICANT CHANGE UP (ref 11.5–15.5)
IMM GRANULOCYTES NFR BLD AUTO: 0.3 % — SIGNIFICANT CHANGE UP (ref 0–1.5)
LYMPHOCYTES # BLD AUTO: 1.08 K/UL — SIGNIFICANT CHANGE UP (ref 1–3.3)
LYMPHOCYTES # BLD AUTO: 9.2 % — LOW (ref 13–44)
MCHC RBC-ENTMCNC: 31.1 PG — SIGNIFICANT CHANGE UP (ref 27–34)
MCHC RBC-ENTMCNC: 32.5 GM/DL — SIGNIFICANT CHANGE UP (ref 32–36)
MCV RBC AUTO: 95.8 FL — SIGNIFICANT CHANGE UP (ref 80–100)
MONOCYTES # BLD AUTO: 1.14 K/UL — HIGH (ref 0–0.9)
MONOCYTES NFR BLD AUTO: 9.7 % — SIGNIFICANT CHANGE UP (ref 2–14)
NEUTROPHILS # BLD AUTO: 9.21 K/UL — HIGH (ref 1.8–7.4)
NEUTROPHILS NFR BLD AUTO: 78.5 % — HIGH (ref 43–77)
NRBC # BLD: 0 /100 WBCS — SIGNIFICANT CHANGE UP (ref 0–0)
PLATELET # BLD AUTO: 187 K/UL — SIGNIFICANT CHANGE UP (ref 150–400)
RBC # BLD: 4.27 M/UL — SIGNIFICANT CHANGE UP (ref 3.8–5.2)
RBC # FLD: 13.5 % — SIGNIFICANT CHANGE UP (ref 10.3–14.5)
WBC # BLD: 11.74 K/UL — HIGH (ref 3.8–10.5)
WBC # FLD AUTO: 11.74 K/UL — HIGH (ref 3.8–10.5)

## 2021-06-15 PROCEDURE — 99285 EMERGENCY DEPT VISIT HI MDM: CPT

## 2021-06-15 RX ORDER — TETANUS TOXOID, REDUCED DIPHTHERIA TOXOID AND ACELLULAR PERTUSSIS VACCINE, ADSORBED 5; 2.5; 8; 8; 2.5 [IU]/.5ML; [IU]/.5ML; UG/.5ML; UG/.5ML; UG/.5ML
0.5 SUSPENSION INTRAMUSCULAR ONCE
Refills: 0 | Status: COMPLETED | OUTPATIENT
Start: 2021-06-15 | End: 2021-06-15

## 2021-06-15 RX ORDER — SODIUM CHLORIDE 9 MG/ML
1000 INJECTION INTRAMUSCULAR; INTRAVENOUS; SUBCUTANEOUS ONCE
Refills: 0 | Status: COMPLETED | OUTPATIENT
Start: 2021-06-15 | End: 2021-06-15

## 2021-06-15 RX ADMIN — SODIUM CHLORIDE 1000 MILLILITER(S): 9 INJECTION INTRAMUSCULAR; INTRAVENOUS; SUBCUTANEOUS at 23:25

## 2021-06-15 RX ADMIN — TETANUS TOXOID, REDUCED DIPHTHERIA TOXOID AND ACELLULAR PERTUSSIS VACCINE, ADSORBED 0.5 MILLILITER(S): 5; 2.5; 8; 8; 2.5 SUSPENSION INTRAMUSCULAR at 23:24

## 2021-06-15 NOTE — ED PROVIDER NOTE - CARE PLAN
Principal Discharge DX:	Fall, initial encounter   Principal Discharge DX:	Closed fracture of ramus of left pubis, initial encounter

## 2021-06-15 NOTE — ED ADULT NURSE NOTE - NSIMPLEMENTINTERV_GEN_ALL_ED
Implemented All Fall with Harm Risk Interventions:  [place] to call system. Call bell, personal items and telephone within reach. Instruct patient to call for assistance. Room bathroom lighting operational. Non-slip footwear when patient is off stretcher. Physically safe environment: no spills, clutter or unnecessary equipment. Stretcher in lowest position, wheels locked, appropriate side rails in place. Provide visual cue, wrist band, yellow gown, etc. Monitor gait and stability. Monitor for mental status changes and reorient to person, place, and time. Review medications for side effects contributing to fall risk. Reinforce activity limits and safety measures with patient and family. Provide visual clues: red socks.

## 2021-06-15 NOTE — ED PROVIDER NOTE - PHYSICAL EXAMINATION
GENERAL: NAD, Cachectic, old appearing   HEENT: bruise on L forehead, EOMI, PERRLA, conjunctiva and sclera clear, oral mucosa moist, clear w/o any exudate   NECK: No cervical spine tenderness   CHEST/LUNG: Clear to auscultation bilaterally; No wheeze  HEART: Regular rate and rhythm; No murmurs, rubs, or gallops  ABDOMEN: Soft, Nontender, Nondistended; Bowel sounds present  EXTREMITIES:  asymmetry in LE, no tenderness to palpation over L hip or Knee   PSYCH: AAOx3  NEUROLOGY: non-focal, unable to move LLE due to pain and weakness

## 2021-06-15 NOTE — ED ADULT NURSE NOTE - OBJECTIVE STATEMENT
89 y old female with PMH of HTN and recurrent falls presents to the ED BIBEMS home s/p fall. Pt c/o L leg pain 89 y old female with PMH of HTN and recurrent falls presents to the ED BIBEMS home s/p fall. Pt c/o generalized L leg pain on movement. Pt states she ambulates with walker at home but forgot her walker, lost balance and fell. Pt reports she hit her head but did not lose consciousness. Pt states she takes aspirin. EMS administered 25 mcg of Fentanyl. Pt denies HA, SOB, CP, abd pain, n/v/d. Pt A&O x 4. Respirations even and unlabored. Chest rise symmetrical. Ecchymosis and abrasions noted above L eye, and L arm. Skin warm, dry and intact. Peripheral pulses strong. Bed in lowest position, side rails up and call bell in reach.

## 2021-06-15 NOTE — ED PROVIDER NOTE - CLINICAL SUMMARY MEDICAL DECISION MAKING FREE TEXT BOX
88 yo F with PMHx of Depression, Frequent Falls, ?Tachycardia (on beta blocker, no AF), ?Cardiac Aneurysm (was on Coumadin, off few years), and TIAs presented after a fall, found to have pubic L superior and inferior pubic rami fracture 89 yo F with PMHx of Depression, Frequent Falls, ?Tachycardia (on beta blocker, no AF), ?Cardiac Aneurysm (was on Coumadin, off few years), and TIAs presented after a fall, found to have pubic L superior and inferior pubic rami fracture    [place]: 89 year old female with frequent falls, tachycardia, here with fall. c/o pain to hips. will get imaging, pain control, admit.

## 2021-06-15 NOTE — ED ADULT NURSE NOTE - NS PRO PASSIVE SMOKE EXP
"Unknown

## 2021-06-15 NOTE — ED PROVIDER NOTE - OBJECTIVE STATEMENT
PMH Depression, Frequent Falls, ?Tachycardia (on beta blocker, no AF), ?Cardiac Aneurysm (was on Coumadin, off few years), and TIAs presented after a fall. Patient had the fall one day prior to presentation. She lost balance and fell on her L side. She was down for few minutes. She hit her head but denies losing her consciousness. She denies any chest pain, palpitations, jerky movements, bowel or urinary continence during or after the fall. Afterwards, she developed L LE pain that progressively worsens. It is intermittent and was associated with only weight bearing. She denies any back pain, bowel or urinary incontinence, significant motor weakness or loss of sensation. She had >20 falls in the last 3 years but was never admitted for any major trauma. 88 yo F with PMHx of Depression, Frequent Falls, ?Tachycardia (on beta blocker, no AF), ?Cardiac Aneurysm (was on Coumadin, off few years), and TIAs presented after a fall. Patient had the fall one day prior to presentation. She lost balance and fell on her L side. She was down for few minutes. She hit her head but denies losing her consciousness. She denies any chest pain, palpitations, jerky movements, bowel or urinary continence during or after the fall. Afterwards, she developed L LE pain that progressively worsens. It is intermittent and was associated with only weight bearing. She denies any back pain, bowel or urinary incontinence, significant motor weakness or loss of sensation. She had >20 falls in the last 3 years but was never admitted for any major trauma.

## 2021-06-15 NOTE — ED ADULT NURSE NOTE - FINAL NURSING ELECTRONIC SIGNATURE
- 16-Jun-2021 05:03

## 2021-06-16 ENCOUNTER — TRANSCRIPTION ENCOUNTER (OUTPATIENT)
Age: 86
End: 2021-06-16

## 2021-06-16 VITALS
OXYGEN SATURATION: 94 % | SYSTOLIC BLOOD PRESSURE: 118 MMHG | DIASTOLIC BLOOD PRESSURE: 56 MMHG | HEART RATE: 97 BPM | TEMPERATURE: 98 F | RESPIRATION RATE: 18 BRPM
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
## 2021-06-16 DIAGNOSIS — R00.2 PALPITATIONS: ICD-10-CM

## 2021-06-16 DIAGNOSIS — Z79.899 OTHER LONG TERM (CURRENT) DRUG THERAPY: ICD-10-CM

## 2021-06-16 DIAGNOSIS — E87.0 HYPEROSMOLALITY AND HYPERNATREMIA: ICD-10-CM

## 2021-06-16 DIAGNOSIS — S32.599A OTHER SPECIFIED FRACTURE OF UNSPECIFIED PUBIS, INITIAL ENCOUNTER FOR CLOSED FRACTURE: ICD-10-CM

## 2021-06-16 DIAGNOSIS — F32.9 MAJOR DEPRESSIVE DISORDER, SINGLE EPISODE, UNSPECIFIED: ICD-10-CM

## 2021-06-16 DIAGNOSIS — Z29.9 ENCOUNTER FOR PROPHYLACTIC MEASURES, UNSPECIFIED: ICD-10-CM

## 2021-06-16 DIAGNOSIS — I63.9 CEREBRAL INFARCTION, UNSPECIFIED: ICD-10-CM

## 2021-06-16 DIAGNOSIS — N39.0 URINARY TRACT INFECTION, SITE NOT SPECIFIED: ICD-10-CM

## 2021-06-16 PROBLEM — N32.9 BLADDER DISORDER, UNSPECIFIED: Chronic | Status: ACTIVE | Noted: 2020-03-03

## 2021-06-16 LAB
ALBUMIN SERPL ELPH-MCNC: 3.2 G/DL — LOW (ref 3.3–5)
ALBUMIN SERPL ELPH-MCNC: 3.5 G/DL — SIGNIFICANT CHANGE UP (ref 3.3–5)
ALP SERPL-CCNC: 78 U/L — SIGNIFICANT CHANGE UP (ref 40–120)
ALP SERPL-CCNC: 88 U/L — SIGNIFICANT CHANGE UP (ref 40–120)
ALT FLD-CCNC: 12 U/L — SIGNIFICANT CHANGE UP (ref 10–45)
ALT FLD-CCNC: 14 U/L — SIGNIFICANT CHANGE UP (ref 10–45)
ANION GAP SERPL CALC-SCNC: 13 MMOL/L — SIGNIFICANT CHANGE UP (ref 5–17)
ANION GAP SERPL CALC-SCNC: 13 MMOL/L — SIGNIFICANT CHANGE UP (ref 5–17)
APPEARANCE UR: ABNORMAL
AST SERPL-CCNC: 17 U/L — SIGNIFICANT CHANGE UP (ref 10–40)
AST SERPL-CCNC: 21 U/L — SIGNIFICANT CHANGE UP (ref 10–40)
BACTERIA # UR AUTO: ABNORMAL
BASOPHILS # BLD AUTO: 0.04 K/UL — SIGNIFICANT CHANGE UP (ref 0–0.2)
BASOPHILS NFR BLD AUTO: 0.4 % — SIGNIFICANT CHANGE UP (ref 0–2)
BILIRUB SERPL-MCNC: 0.4 MG/DL — SIGNIFICANT CHANGE UP (ref 0.2–1.2)
BILIRUB SERPL-MCNC: 0.7 MG/DL — SIGNIFICANT CHANGE UP (ref 0.2–1.2)
BILIRUB UR-MCNC: NEGATIVE — SIGNIFICANT CHANGE UP
BLD GP AB SCN SERPL QL: NEGATIVE — SIGNIFICANT CHANGE UP
BUN SERPL-MCNC: 28 MG/DL — HIGH (ref 7–23)
BUN SERPL-MCNC: 34 MG/DL — HIGH (ref 7–23)
CALCIUM SERPL-MCNC: 8.4 MG/DL — SIGNIFICANT CHANGE UP (ref 8.4–10.5)
CALCIUM SERPL-MCNC: 9 MG/DL — SIGNIFICANT CHANGE UP (ref 8.4–10.5)
CHLORIDE SERPL-SCNC: 109 MMOL/L — HIGH (ref 96–108)
CHLORIDE SERPL-SCNC: 112 MMOL/L — HIGH (ref 96–108)
CO2 SERPL-SCNC: 21 MMOL/L — LOW (ref 22–31)
CO2 SERPL-SCNC: 21 MMOL/L — LOW (ref 22–31)
COLOR SPEC: YELLOW — SIGNIFICANT CHANGE UP
CREAT SERPL-MCNC: 0.89 MG/DL — SIGNIFICANT CHANGE UP (ref 0.5–1.3)
CREAT SERPL-MCNC: 1.03 MG/DL — SIGNIFICANT CHANGE UP (ref 0.5–1.3)
DIFF PNL FLD: ABNORMAL
EOSINOPHIL # BLD AUTO: 0.42 K/UL — SIGNIFICANT CHANGE UP (ref 0–0.5)
EOSINOPHIL NFR BLD AUTO: 4 % — SIGNIFICANT CHANGE UP (ref 0–6)
EPI CELLS # UR: 0 /HPF — SIGNIFICANT CHANGE UP
GLUCOSE SERPL-MCNC: 124 MG/DL — HIGH (ref 70–99)
GLUCOSE SERPL-MCNC: 138 MG/DL — HIGH (ref 70–99)
GLUCOSE UR QL: NEGATIVE — SIGNIFICANT CHANGE UP
HCT VFR BLD CALC: 37.3 % — SIGNIFICANT CHANGE UP (ref 34.5–45)
HGB BLD-MCNC: 12 G/DL — SIGNIFICANT CHANGE UP (ref 11.5–15.5)
IMM GRANULOCYTES NFR BLD AUTO: 0.5 % — SIGNIFICANT CHANGE UP (ref 0–1.5)
KETONES UR-MCNC: NEGATIVE — SIGNIFICANT CHANGE UP
LEUKOCYTE ESTERASE UR-ACNC: ABNORMAL
LYMPHOCYTES # BLD AUTO: 1.4 K/UL — SIGNIFICANT CHANGE UP (ref 1–3.3)
LYMPHOCYTES # BLD AUTO: 13.3 % — SIGNIFICANT CHANGE UP (ref 13–44)
MAGNESIUM SERPL-MCNC: 2.1 MG/DL — SIGNIFICANT CHANGE UP (ref 1.6–2.6)
MCHC RBC-ENTMCNC: 31.4 PG — SIGNIFICANT CHANGE UP (ref 27–34)
MCHC RBC-ENTMCNC: 32.2 GM/DL — SIGNIFICANT CHANGE UP (ref 32–36)
MCV RBC AUTO: 97.6 FL — SIGNIFICANT CHANGE UP (ref 80–100)
MONOCYTES # BLD AUTO: 1.12 K/UL — HIGH (ref 0–0.9)
MONOCYTES NFR BLD AUTO: 10.6 % — SIGNIFICANT CHANGE UP (ref 2–14)
NEUTROPHILS # BLD AUTO: 7.53 K/UL — HIGH (ref 1.8–7.4)
NEUTROPHILS NFR BLD AUTO: 71.2 % — SIGNIFICANT CHANGE UP (ref 43–77)
NITRITE UR-MCNC: POSITIVE
NRBC # BLD: 0 /100 WBCS — SIGNIFICANT CHANGE UP (ref 0–0)
PH UR: 6 — SIGNIFICANT CHANGE UP (ref 5–8)
PHOSPHATE SERPL-MCNC: 3.6 MG/DL — SIGNIFICANT CHANGE UP (ref 2.5–4.5)
PLATELET # BLD AUTO: 165 K/UL — SIGNIFICANT CHANGE UP (ref 150–400)
POTASSIUM SERPL-MCNC: 4 MMOL/L — SIGNIFICANT CHANGE UP (ref 3.5–5.3)
POTASSIUM SERPL-MCNC: 4.4 MMOL/L — SIGNIFICANT CHANGE UP (ref 3.5–5.3)
POTASSIUM SERPL-SCNC: 4 MMOL/L — SIGNIFICANT CHANGE UP (ref 3.5–5.3)
POTASSIUM SERPL-SCNC: 4.4 MMOL/L — SIGNIFICANT CHANGE UP (ref 3.5–5.3)
PROT SERPL-MCNC: 5.8 G/DL — LOW (ref 6–8.3)
PROT SERPL-MCNC: 6.4 G/DL — SIGNIFICANT CHANGE UP (ref 6–8.3)
PROT UR-MCNC: ABNORMAL
RBC # BLD: 3.82 M/UL — SIGNIFICANT CHANGE UP (ref 3.8–5.2)
RBC # FLD: 13.6 % — SIGNIFICANT CHANGE UP (ref 10.3–14.5)
RBC CASTS # UR COMP ASSIST: 3 /HPF — SIGNIFICANT CHANGE UP (ref 0–4)
RH IG SCN BLD-IMP: POSITIVE — SIGNIFICANT CHANGE UP
SARS-COV-2 RNA SPEC QL NAA+PROBE: SIGNIFICANT CHANGE UP
SODIUM SERPL-SCNC: 143 MMOL/L — SIGNIFICANT CHANGE UP (ref 135–145)
SODIUM SERPL-SCNC: 146 MMOL/L — HIGH (ref 135–145)
SP GR SPEC: 1.02 — SIGNIFICANT CHANGE UP (ref 1.01–1.02)
UROBILINOGEN FLD QL: NEGATIVE — SIGNIFICANT CHANGE UP
WBC # BLD: 10.56 K/UL — HIGH (ref 3.8–10.5)
WBC # FLD AUTO: 10.56 K/UL — HIGH (ref 3.8–10.5)
WBC UR QL: 163 /HPF — HIGH (ref 0–5)

## 2021-06-16 PROCEDURE — 90715 TDAP VACCINE 7 YRS/> IM: CPT

## 2021-06-16 PROCEDURE — 99285 EMERGENCY DEPT VISIT HI MDM: CPT | Mod: 25

## 2021-06-16 PROCEDURE — 73552 X-RAY EXAM OF FEMUR 2/>: CPT

## 2021-06-16 PROCEDURE — 93005 ELECTROCARDIOGRAM TRACING: CPT

## 2021-06-16 PROCEDURE — 72170 X-RAY EXAM OF PELVIS: CPT

## 2021-06-16 PROCEDURE — 87186 SC STD MICRODIL/AGAR DIL: CPT

## 2021-06-16 PROCEDURE — 87086 URINE CULTURE/COLONY COUNT: CPT

## 2021-06-16 PROCEDURE — 72125 CT NECK SPINE W/O DYE: CPT

## 2021-06-16 PROCEDURE — U0005: CPT

## 2021-06-16 PROCEDURE — 83735 ASSAY OF MAGNESIUM: CPT

## 2021-06-16 PROCEDURE — 76377 3D RENDER W/INTRP POSTPROCES: CPT

## 2021-06-16 PROCEDURE — 84100 ASSAY OF PHOSPHORUS: CPT

## 2021-06-16 PROCEDURE — 90471 IMMUNIZATION ADMIN: CPT

## 2021-06-16 PROCEDURE — 97162 PT EVAL MOD COMPLEX 30 MIN: CPT

## 2021-06-16 PROCEDURE — 86850 RBC ANTIBODY SCREEN: CPT

## 2021-06-16 PROCEDURE — 70450 CT HEAD/BRAIN W/O DYE: CPT

## 2021-06-16 PROCEDURE — 72192 CT PELVIS W/O DYE: CPT

## 2021-06-16 PROCEDURE — 96374 THER/PROPH/DIAG INJ IV PUSH: CPT | Mod: XU

## 2021-06-16 PROCEDURE — 71045 X-RAY EXAM CHEST 1 VIEW: CPT

## 2021-06-16 PROCEDURE — 86901 BLOOD TYPING SEROLOGIC RH(D): CPT

## 2021-06-16 PROCEDURE — U0003: CPT

## 2021-06-16 PROCEDURE — 86900 BLOOD TYPING SEROLOGIC ABO: CPT

## 2021-06-16 PROCEDURE — 80053 COMPREHEN METABOLIC PANEL: CPT

## 2021-06-16 PROCEDURE — 85025 COMPLETE CBC W/AUTO DIFF WBC: CPT

## 2021-06-16 PROCEDURE — 81001 URINALYSIS AUTO W/SCOPE: CPT

## 2021-06-16 RX ORDER — CEFTRIAXONE 500 MG/1
1000 INJECTION, POWDER, FOR SOLUTION INTRAMUSCULAR; INTRAVENOUS EVERY 24 HOURS
Refills: 0 | Status: DISCONTINUED | OUTPATIENT
Start: 2021-06-16 | End: 2021-06-16

## 2021-06-16 RX ORDER — TRAMADOL HYDROCHLORIDE 50 MG/1
0.5 TABLET ORAL
Qty: 0 | Refills: 0 | DISCHARGE
Start: 2021-06-16

## 2021-06-16 RX ORDER — CEFTRIAXONE 500 MG/1
1000 INJECTION, POWDER, FOR SOLUTION INTRAMUSCULAR; INTRAVENOUS ONCE
Refills: 0 | Status: COMPLETED | OUTPATIENT
Start: 2021-06-16 | End: 2021-06-16

## 2021-06-16 RX ORDER — ACETAMINOPHEN 500 MG
2 TABLET ORAL
Qty: 0 | Refills: 0 | DISCHARGE
Start: 2021-06-16

## 2021-06-16 RX ORDER — METOPROLOL TARTRATE 50 MG
12.5 TABLET ORAL DAILY
Refills: 0 | Status: DISCONTINUED | OUTPATIENT
Start: 2021-06-16 | End: 2021-06-16

## 2021-06-16 RX ORDER — CITALOPRAM 10 MG/1
10 TABLET, FILM COATED ORAL DAILY
Refills: 0 | Status: DISCONTINUED | OUTPATIENT
Start: 2021-06-16 | End: 2021-06-16

## 2021-06-16 RX ORDER — ACETAMINOPHEN 500 MG
975 TABLET ORAL ONCE
Refills: 0 | Status: COMPLETED | OUTPATIENT
Start: 2021-06-16 | End: 2021-06-16

## 2021-06-16 RX ORDER — ACETAMINOPHEN 500 MG
650 TABLET ORAL EVERY 6 HOURS
Refills: 0 | Status: DISCONTINUED | OUTPATIENT
Start: 2021-06-16 | End: 2021-06-16

## 2021-06-16 RX ORDER — ASPIRIN/CALCIUM CARB/MAGNESIUM 324 MG
81 TABLET ORAL DAILY
Refills: 0 | Status: DISCONTINUED | OUTPATIENT
Start: 2021-06-16 | End: 2021-06-16

## 2021-06-16 RX ORDER — CITALOPRAM 10 MG/1
0 TABLET, FILM COATED ORAL
Qty: 0 | Refills: 0 | DISCHARGE

## 2021-06-16 RX ORDER — TRAMADOL HYDROCHLORIDE 50 MG/1
25 TABLET ORAL EVERY 6 HOURS
Refills: 0 | Status: DISCONTINUED | OUTPATIENT
Start: 2021-06-16 | End: 2021-06-16

## 2021-06-16 RX ORDER — HEPARIN SODIUM 5000 [USP'U]/ML
5000 INJECTION INTRAVENOUS; SUBCUTANEOUS EVERY 8 HOURS
Refills: 0 | Status: DISCONTINUED | OUTPATIENT
Start: 2021-06-16 | End: 2021-06-16

## 2021-06-16 RX ADMIN — HEPARIN SODIUM 5000 UNIT(S): 5000 INJECTION INTRAVENOUS; SUBCUTANEOUS at 05:46

## 2021-06-16 RX ADMIN — HEPARIN SODIUM 5000 UNIT(S): 5000 INJECTION INTRAVENOUS; SUBCUTANEOUS at 13:41

## 2021-06-16 RX ADMIN — Medication 975 MILLIGRAM(S): at 00:40

## 2021-06-16 RX ADMIN — CITALOPRAM 10 MILLIGRAM(S): 10 TABLET, FILM COATED ORAL at 11:35

## 2021-06-16 RX ADMIN — Medication 81 MILLIGRAM(S): at 11:35

## 2021-06-16 RX ADMIN — CEFTRIAXONE 100 MILLIGRAM(S): 500 INJECTION, POWDER, FOR SOLUTION INTRAMUSCULAR; INTRAVENOUS at 02:02

## 2021-06-16 RX ADMIN — Medication 12.5 MILLIGRAM(S): at 05:46

## 2021-06-16 NOTE — DISCHARGE NOTE PROVIDER - NSDCMRMEDTOKEN_GEN_ALL_CORE_FT
acetaminophen 325 mg oral tablet: 2 tab(s) orally every 6 hours, As needed, Temp greater or equal to 38C (100.4F), Mild Pain (1 - 3)  acetaminophen 325 mg oral tablet: 2 tab(s) orally every 6 hours, As needed, Mild Pain (1 - 3)  acetylcysteine 10% inhalation solution: 600 milligram(s) orally 2 times a day   aspirin 81 mg oral tablet: 1 tab(s) orally once a day  CeleXA 20 mg oral tablet: 0.5 tab(s) orally once a day  ibuprofen 400 mg oral tablet: 1 tab(s) orally every 8 hours, As needed, Moderate Pain (4 - 6)  ipratropium-albuterol 0.5 mg-2.5 mg/3 mLinhalation solution: 3 milliliter(s) inhaled every 8 hours, As Needed for shortness of breath  sulfamethoxazole-trimethoprim 800 mg-160 mg oral tablet: 1 tab(s) orally every 12 hours  Toprol-XL 25 mg oral tablet, extended release: 0.5 tab(s) orally once a day  traMADol 50 mg oral tablet: 0.5 tab(s) orally every 6 hours, As needed, Severe Pain (7 - 10)   apixaban 5 mg oral tablet: 1 tab(s) orally every 12 hours  citalopram 20 mg oral tablet: 1 tab(s) orally once a day  clotrimazole-betamethasone dipropionate 1%-0.05% topical cream: 1 application topically 2 times a day  metoprolol succinate 50 mg oral tablet, extended release: 1 tab(s) orally once a day

## 2021-06-16 NOTE — PHYSICAL THERAPY INITIAL EVALUATION ADULT - PERTINENT HX OF CURRENT PROBLEM, REHAB EVAL
89y Female who presents to the ED today with c/o left hip pain and inability to ambulate s/p mechanical fall yesterday. She was forgot her walker in the other room and fell. Pt denies any other injuries. Pt reports head strike, but denies LOC. CT scan of the pelvis demonstrates left inferior and superior pubic ramus fractures. per ortho note- No acute orthopedic surgical intervention indicated at this time. WBAT LLE . CT c spine- no acute fx/dislocation. CTH- no acute intracranial trauma. 89y Female who presents to the ED today with c/o left hip pain and inability to ambulate s/p mechanical fall yesterday. She was forgot her walker in the other room and fell. Pt denies any other injuries. Pt reports head strike, but denies LOC. CT scan of the pelvis demonstrates left inferior and superior pubic ramus fractures. per ortho note 6/16- No acute orthopedic surgical intervention indicated at this time. WBAT LLE . CT c spine- no acute fx/dislocation. CTH- no acute intracranial trauma.

## 2021-06-16 NOTE — H&P ADULT - HISTORY OF PRESENT ILLNESS
88 yo F with PMH of Depression, frequent falls, ?tachycardia, ?cardiac aneurysm (was on coumain, off few years), TIAs presents here after a fall.    Patient was admitted in March 2020 for abnormal CT findings of viral pneumonia and LLL atelectasis due ot mucus plugging.       89 yo F with PMH of Depression, frequent falls, ?tachycardia on BB, ?cardiac aneurysm (was on coumadin, off few years), TIAs presents here after a fall.  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  After her  passed away, they are staying with her older daughter who is very supportive.  Patient was helping her daughter fix her bed night before yesterday.  After that when she was leaving her room, she forgot to take her walker.  She was sleepy and tired.  When suddenly she fell "flew few feet away" and landed on her left side and she could not get up.  Her older daughter and son-amber picked her up and put her on the chair.  She was bleeding from her forehead, stopped with "frozen peas".  Next day she had hard time standing on her feet and walking.  She had excruciating pain on the hip.  In the afternoon, she was sitting on the couch and was not able to get up on her own due to pain.  Her daughter then called PMD who suggested she come to [facility] for further evaluation.  Patient otherwise denies fever, chills, cough, SOB, chest pain, palpitation.  She also denies abdominal pain, diarrhea or dysuria.

## 2021-06-16 NOTE — H&P ADULT - NSHPLABSRESULTS_GEN_ALL_CORE
Labs personally reviewed:                          13.3   11.74 )-----------( 187      ( 15 Manpreet 2021 23:35 )             40.9     06-15    143  |  109<H>  |  34<H>  ----------------------------<  138<H>  4.4   |  21<L>  |  1.03    Ca    9.0      15 Manpreet 2021 23:35    TPro  6.4  /  Alb  3.5  /  TBili  0.7  /  DBili  x   /  AST  21  /  ALT  14  /  AlkPhos  88  06-15        LIVER FUNCTIONS - ( 15 Manpreet 2021 23:35 )  Alb: 3.5 g/dL / Pro: 6.4 g/dL / ALK PHOS: 88 U/L / ALT: 14 U/L / AST: 21 U/L / GGT: x             Urinalysis Basic - ( 2021 00:57 )    Color: Yellow / Appearance: Slightly Turbid / S.019 / pH: x  Gluc: x / Ketone: Negative  / Bili: Negative / Urobili: Negative   Blood: x / Protein: 30 mg/dL / Nitrite: Positive   Leuk Esterase: Large / RBC: 3 /hpf /  /HPF   Sq Epi: x / Non Sq Epi: 0 /hpf / Bacteria: Many      Imaging:  CXR personally reviewed: no focal opacity  CT pelvis reviewed:  FINDINGS:    There is an acute minimally minimally fracture through the left iliac bone extending from the iliac wing to the level of the sacroiliac joint posteriorly (6, 101 and 6, 35).  There is an acute minimally displaced fracture at the junction of the left superior pubic ramus and acetabulum (example 6, 159). Age-indeterminate mildly displaced fractures of the left inferior pubic ramus and left superior pubic ramus at the pubic symphysis, possibly subacute.  There is bilateral hip osteoarthrosis. Degenerative changes are noted at the lower lumbar spine. There is a hemangioma within the L4 vertebral body. There is mild subcutaneous stranding in the left lateral buttocks.    Other: Colonic diverticulosis. Hysterectomy. Small fat-containing umbilical hernia. Atherosclerotic calcifications. Punctate nonobstructing right renal calculus. Bilateral inguinal clips. Fat-containing right inguinal hernia.    IMPRESSION:    Acute minimally displaced fracture of the left iliac bone extending to the level of the posterior sacroiliac joint. Acute minimally displaced fracture at the junction of left superior pubic ramus and acetabulum. Age-indeterminate fractures of left superior and inferior.    CT head reviewed:  IMPRESSION:  No evidence of acute intracranial trauma or acute displaced cervical spine fracture.    XR pelvis reviewed:    IMPRESSION:  Oblique lucencies through the left superior and inferior pubic rami as well as the left superior pubic symphysis, likely representing minimally displaced fractures. Recommend correlation with currently ordered CT pelvis.    XR left femur reviewed:  IMPRESSION:  Oblique lucencies through the left superior and inferior pubic rami as well as the left superior pubic symphysis, likely representing minimally displaced fractures. Recommend correlation with currently ordered CT pelvis.      EKG pending

## 2021-06-16 NOTE — H&P ADULT - NSICDXPASTMEDICALHX_GEN_ALL_CORE_FT
PAST MEDICAL HISTORY:  Bladder disorder dropped bladder    Depression     Heart rate fast     Palpitation     Stroke

## 2021-06-16 NOTE — CONSULT NOTE ADULT - SUBJECTIVE AND OBJECTIVE BOX
Orthopedics Consult Note:    This is a 89y Female who presents to the ED today with c/o left hip pain and inability to ambulate s/p mechanical fall yesterday. She was forgot her walker in the other room and fell. Pt denies any other injuries. Pt reports head strike, but denies LOC. Pt denies any numbness, tingling or parethesias. Pt denies fever or chills. Pt is a home ambulator with a walker at baseline.    Past Medical & Surgical History:  No pertinent family history in first degree relatives    MEWS Score    No pertinent past medical history    Stroke    Heart rate fast    Heart rate fast    Bladder disorder    Fall, initial encounter    H/O hernia repair    LEG PAIN/FALL    90+    [email or web address]        Allergies:  No Known Allergies      Vital Signs:  T(C): 36.6 (06-15-21 @ 22:29), Max: 36.6 (06-15-21 @ 22:29)  HR: 128 (06-15-21 @ 22:29) (97 - 128)  BP: 99/73 (06-15-21 @ 22:29) (99/73 - 108/59)  RR: 18 (06-15-21 @ 22:29) (18 - 19)  SpO2: 97% (06-15-21 @ 22:29) (97% - 97%)    Labs:      X-rays of the pelvis demonstrate minimally displaced superior and inferior pubic ramus fractures.  CT scan of the pelvis demonstrates left inferior and superior pubic ramus fractures.    PE left hip:  No swelling, no ecchymosis, no erythema, skin intact, normothermic. Mildly TTP over pubis. No groin or troch tenderness. Limited active ROM 2' pain. Unable to SLR. No pain with axial loading or log roll. Moving all toes and ankle, +EHL/FHL/TA/GS. SILT throughout. DP and PT pulses 2+.    Secondary Survey:  Left knee, left ankle with no swelling, no ecchymoses, no abrasions, no lacerations or any other signs of injury. LUE with ecchymosis over proximal forearm/elbow. RLE and b/l UE with full painless baseline ROM and no bony TTP. Able to SLR RLE. No pain with axial loading or log roll RLE. Sensation intact distally, moving all digits. Distal pulses intact.     Spine and ribs with no bony TTP.

## 2021-06-16 NOTE — PROGRESS NOTE ADULT - PROBLEM SELECTOR PLAN 3
s/p IV ceftriaxone  +Ua, although patient is asymptomatic, will c/w IV abx  Will f/u urine culture s/p IV ceftriaxone  +Ua, although patient is asymptomatic  f/u urine culture at rehab  for now will complete 3 days abx with bactrim po 6/17-6/18

## 2021-06-16 NOTE — PROGRESS NOTE ADULT - ASSESSMENT
88 yo F with PMH of Depression, frequent falls, ?tachycardia on BB, ?cardiac aneurysm (was on coumadin, off few years), TIAs presents here after a fall.   89 yo F with PMH of Depression, frequent falls, ?tachycardia on BB, ?cardiac aneurysm (was on coumadin, off few years), TIAs presents here after a mechanical fall found to have Acute minimally displaced fracture of the left iliac bone extending to the level of the posterior sacroiliac joint and Acute minimally displaced fracture at the junction of left superior pubic ramus and acetabulum, no surgical intervention/WBAT per ortho, also with UTI and mild hypernatremia, for d/c to rehab.

## 2021-06-16 NOTE — PROGRESS NOTE ADULT - NSPROGADDITIONALINFOA_GEN_ALL_CORE
discharge time: 40min. Reviewed with KYA Laura. discharge time: 40min. Reviewed with KYA Laura.    Based on my assessment, this patient’s condition has improved and no longer warrants inpatient status and will be changed to outpatient status prior to being discharged from the hospital.  This decision is based on the following reasons: has non operative pelvic fractures accepted to [facility]

## 2021-06-16 NOTE — H&P ADULT - ASSESSMENT
89 yo F with PMH of Depression, frequent falls, ?tachycardia on BB, ?cardiac aneurysm (was on coumadin, off few years), TIAs presents here after a fall.

## 2021-06-16 NOTE — H&P ADULT - PROBLEM SELECTOR PLAN 1
done
Patient with multiple previous falls, here after a fall  Ct shows left sided iliac bone fracture, left superior pubic rami fracture and acetabulum, +age indeterminant left superior/inferior pubic rami fracture, likely subacute, +L4 hemangioma  Orthopedic evaluation appreciated  no surgical evaluation  pain control  PT evaluation

## 2021-06-16 NOTE — DISCHARGE NOTE PROVIDER - CARE PROVIDERS DIRECT ADDRESSES
[email or web address]

## 2021-06-16 NOTE — PROGRESS NOTE ADULT - CONVERSATION DETAILS
patient has multiple sisters.  One just  suddenly within past month.  She states for herself she wants a natural death.  Would not want CPR or mechanical ventilation.  Would want limited medical interventions including abx.  Would NOT want feeding tube.  Has a will somewhere but not sure where.  Had discussed this with her  who has since passed.

## 2021-06-16 NOTE — CONSULT NOTE ADULT - ASSESSMENT
Assessment:  89y Female with left inferior and superior pubic rami fractures hip fracture s/p mechanical fall yesterday.    Plan:  No acute orthopedic surgical intervention indicated at this time.  WBAT LLE  PT/OT/OOB  Pain control  Ice application PRN  DVT prophylaxis per primary team  Follow-up with Dr. Jimenez in 2 weeks; call office for appointment.    Case discussed with Dr. Jimenez, who agrees with plan.

## 2021-06-16 NOTE — DISCHARGE NOTE PROVIDER - NSDCCPCAREPLAN_GEN_ALL_CORE_FT
PRINCIPAL DISCHARGE DIAGNOSIS  Diagnosis: Fall, initial encounter  Assessment and Plan of Treatment: pelvic fracture - as per ortho no Sx intervention - Rehab pain control

## 2021-06-16 NOTE — CHART NOTE - NSCHARTNOTEFT_GEN_A_CORE
89 yo F with PMH of Depression, frequent falls, ?tachycardia, ?cardiac aneurysm (was on coumain, off few years), TIAs presents here after a mechanical fall.     found :  left inferior and superior pubic ramus fractures.          UTI   c/o low back pain 5/10      f     Vital Signs:  Vital Signs Last 24 Hrs  T(C): 37.1 (16 Jun 2021 07:57), Max: 37.2 (16 Jun 2021 03:23)  T(F): 98.7 (16 Jun 2021 07:57), Max: 98.9 (16 Jun 2021 03:23)  HR: 76 (16 Jun 2021 07:57) (70 - 128)  BP: 106/72 (16 Jun 2021 07:57) (99/73 - 122/69)  BP(mean): --  RR: 18 (16 Jun 2021 07:57) (12 - 19)  SpO2: 96% (16 Jun 2021 07:57) (96% - 100%)    Labs:                        12.0   10.56 )-----------( 165      ( 16 Jun 2021 05:45 )             37.3     CBC Full  -  ( 16 Jun 2021 05:45 )  WBC Count : 10.56 K/uL  RBC Count : 3.82 M/uL  Hemoglobin : 12.0 g/dL  Hematocrit : 37.3 %  Platelet Count - Automated : 165 K/uL  Mean Cell Volume : 97.6 fl  Mean Cell Hemoglobin : 31.4 pg  Mean Cell Hemoglobin Concentration : 32.2 gm/dL  Auto Neutrophil # : 7.53 K/uL  Auto Lymphocyte # : 1.40 K/uL  Auto Monocyte # : 1.12 K/uL  Auto Eosinophil # : 0.42 K/uL  Auto Basophil # : 0.04 K/uL  Auto Neutrophil % : 71.2 %  Auto Lymphocyte % : 13.3 %  Auto Monocyte % : 10.6 %  Auto Eosinophil % : 4.0 %  Auto Basophil % : 0.4 %    06-16    146<H>  |  112<H>  |  28<H>  ----------------------------<  124<H>  4.0   |  21<L>  |  0.89    Ca    8.4      16 Jun 2021 05:44  Phos  3.6     06-16  Mg     2.1     06-16    TPro  5.8<L>  /  Alb  3.2<L>  /  TBili  0.4  /  DBili  x   /  AST  17  /  ALT  12  /  AlkPhos  78  06-16        LIVER FUNCTIONS - ( 16 Jun 2021 05:44 )  Alb: 3.2 g/dL / Pro: 5.8 g/dL / ALK PHOS: 78 U/L / ALT: 12 U/L / AST: 17 U/L / GGT: x             Adult Advanced Hemodynamics Last 24 Hrs      Physical Exam:  PHYSICAL EXAM:      Constitutional:    Eyes:    ENMT:    Neck:    Breasts:    Back:    Respiratory:    Cardiovascular:    Gastrointestinal:    Genitourinary:    Rectal:    Extremities:    Vascular:    Neurological:    Skin:    Lymph Nodes:    Musculoskeletal:    Psychiatric:        Assesment / Plan: 89 yo F with PMH of Depression, frequent falls, ?tachycardia, ?cardiac aneurysm (was on coumain, off few years), TIAs presents here after a mechanical fall.     found :  left inferior and superior pubic ramus fractures.          UTI   c/o low back pain 5/10        Vital Signs:  Vital Signs Last 24 Hrs  T(C): 37.1 (16 Jun 2021 07:57), Max: 37.2 (16 Jun 2021 03:23)  T(F): 98.7 (16 Jun 2021 07:57), Max: 98.9 (16 Jun 2021 03:23)  HR: 76 (16 Jun 2021 07:57) (70 - 128)  BP: 106/72 (16 Jun 2021 07:57) (99/73 - 122/69)  BP(mean): --  RR: 18 (16 Jun 2021 07:57) (12 - 19)  SpO2: 96% (16 Jun 2021 07:57) (96% - 100%)    Labs:                        12.0   10.56 )-----------( 165      ( 16 Jun 2021 05:45 )             37.3     CBC Full  -  ( 16 Jun 2021 05:45 )  WBC Count : 10.56 K/uL  RBC Count : 3.82 M/uL  Hemoglobin : 12.0 g/dL  Hematocrit : 37.3 %  Platelet Count - Automated : 165 K/uL  Mean Cell Volume : 97.6 fl  Mean Cell Hemoglobin : 31.4 pg  Mean Cell Hemoglobin Concentration : 32.2 gm/dL  Auto Neutrophil # : 7.53 K/uL  Auto Lymphocyte # : 1.40 K/uL  Auto Monocyte # : 1.12 K/uL  Auto Eosinophil # : 0.42 K/uL  Auto Basophil # : 0.04 K/uL  Auto Neutrophil % : 71.2 %  Auto Lymphocyte % : 13.3 %  Auto Monocyte % : 10.6 %  Auto Eosinophil % : 4.0 %  Auto Basophil % : 0.4 %    06-16    146<H>  |  112<H>  |  28<H>  ----------------------------<  124<H>  4.0   |  21<L>  |  0.89    Ca    8.4      16 Jun 2021 05:44  Phos  3.6     06-16  Mg     2.1     06-16    TPro  5.8<L>  /  Alb  3.2<L>  /  TBili  0.4  /  DBili  x   /  AST  17  /  ALT  12  /  AlkPhos  78  06-16        LIVER FUNCTIONS - ( 16 Jun 2021 05:44 )  Alb: 3.2 g/dL / Pro: 5.8 g/dL / ALK PHOS: 78 U/L / ALT: 12 U/L / AST: 17 U/L / GGT: x             ct pelvis - CT pelvis -Acute minimally displaced fracture of the left iliac bone extending to the level of the posterior sacroiliac joint. Acute minimally displaced fracture at the junction of left superior pubic ramus and acetabulum. Age-indeterminate fractures of left superior and inferior.  lt sacral sx        Physical Exam:  PHYSICAL EXAM:      Constitutional: alert, orient x 3            Back:  unable to perform ROM of L spine secondary to pain    Respiratory: NO crakle     Cardiovascular: s1 s2 NSR    Gastrointestinal: positive  Bs  4 Quad      Extremities: no pedal edema    Vascular: pedal pulse  intact    Neurological: sensation intact B/L Lower ext and L- spine      Musculoskeletal: L spine unable to perform ROM of L spine secondary to pain             Assessment / Plan:89 yo F with PMH of Depression, frequent falls, ?tachycardia, ?cardiac aneurysm (was on coumain, off few years), TIAs presents here after a mechanical fall.     with :  left inferior and superior pubic ramus fractures.          UTI   plan Orthoconsult appreciated  ICE  pain control with anti - inflammatory   possible TLSO brace  PT- OT   as per PT one person assistances needed  UTI - Urine culture sent  Ceftriaxone has been started  D/w Attending DR Brayden duran RPA_c

## 2021-06-16 NOTE — H&P ADULT - NSHPREVIEWOFSYSTEMS_GEN_ALL_CORE
CONSTITUTIONAL: No weakness, fevers or chills  EYES/ENT: No visual changes;  No dysphagia  NECK: No pain or stiffness  RESPIRATORY: No cough, wheezing, hemoptysis; No shortness of breath  CARDIOVASCULAR: No chest pain or palpitations; No lower extremity edema  EXTREMITIES: no le edema, cyanosis, clubbing  MUSCULOSKELETAL: +hip pain  GASTROINTESTINAL: No abdominal or epigastric pain. No nausea, vomiting, or hematemesis; No diarrhea or constipation. No melena or hematochezia.  BACK: No back pain  GENITOURINARY: No dysuria, frequency or hematuria  NEUROLOGICAL: No numbness or weakness  SKIN: No itching, burning, rashes, or lesions   PSYCH: no agitation  All other review of systems is negative unless indicated above.

## 2021-06-16 NOTE — DISCHARGE NOTE PROVIDER - HOSPITAL COURSE
89 yo F with PMH of Depression, frequent falls, ?tachycardia on BB, ?cardiac aneurysm (was on coumadin, off few years), TIAs presents here after a fall.           ·  Problem: Fall.  Plan: Patient with multiple previous falls, here after a fall  Ct shows left sided iliac bone fracture, left superior pubic rami fracture and acetabulum, +age indeterminant left superior/inferior pubic rami fracture, likely subacute, +L4 hemangioma  Orthopedic evaluation appreciated  no surgical evaluation  pain control  PT evaluation.  rehab        ·  : Pubic ramus fracture.   pain control       ·  Problem: UTI (urinary tract infection).  Plan: s/p IV ceftriaxone  +Ua, although patient is asymptomatic,  bactrim   Will f/u urine cultture     · Stroke.  : c/w aspirin and BB.        ·   Depression.  : c/w celexa.        : Palpitation.  c/w metoprolol.    patent will be going at rehab center  patient is clear for rehab by DR Patti Owen    89 yo F with PMH of Depression, frequent falls, ?tachycardia on BB, ?cardiac aneurysm (was on coumadin, off few years), TIAs presents here after a mechanical fall found to have Acute minimally displaced fracture of the left iliac bone extending to the level of the posterior sacroiliac joint and Acute minimally displaced fracture at the junction of left superior pubic ramus and acetabulum, no surgical intervention/WBAT per ortho, also with UTI and mild hypernatremia, for d/c to rehab to complete po abx and oral hydration.

## 2021-06-16 NOTE — PROGRESS NOTE ADULT - PROBLEM SELECTOR PLAN 4
c/w aspirin and BB mild with Na 146.  Patient is alert and oriented x 3 and can take po.  Continue oral hydration

## 2021-06-16 NOTE — H&P ADULT - NSHPPHYSICALEXAM_GEN_ALL_CORE
PHYSICAL EXAM:  Vital Signs Last 24 Hrs  T(C): 37.2 (06-16-21 @ 03:23)  T(F): 98.9 (06-16-21 @ 03:23), Max: 98.9 (06-16-21 @ 03:23)  HR: 70 (06-16-21 @ 03:23) (70 - 128)  BP: 102/65 (06-16-21 @ 03:23)  BP(mean): --  RR: 12 (06-16-21 @ 03:23) (12 - 19)  SpO2: 100% (06-16-21 @ 03:23) (97% - 100%)  Wt(kg): --    Constitutional: NAD, awake and alert  HEAD: +bruising left forehead  EYES: EOMI  ENT:  Normal Hearing, no tonsillar exudates   Neck: Soft and supple, No JVD  Lungs: Breath sounds are clear bilaterally, No wheezing, rales or rhonchi  Heart: S1 and S2, regular rate and rhythm, no Murmurs, gallops or rubs  Abdomen: Bowel Sounds present, soft, nontender, nondistended, no guarding, no rebound  Extremities: No cyanosis or clubbing; warm to touch  Vascular: 2+ peripheral pulses lower ex  Neurological: A/O x 3, no focal deficits  Musculoskeletal: not moving lower extremity due to pain; +mild tenderness left hip  Skin: No rashes  Psych: no depression or anhedonia  HEME: no bruises, no nose bleeds

## 2021-06-16 NOTE — PROGRESS NOTE ADULT - PROBLEM SELECTOR PLAN 1
Patient with multiple previous falls, here after a fall  Ct shows left sided iliac bone fracture, left superior pubic rami fracture and acetabulum, +age indeterminant left superior/inferior pubic rami fracture, likely subacute, +L4 hemangioma  Orthopedic evaluation appreciated  no surgical evaluation  pain control  PT evaluation Patient with multiple previous falls, here after a fall  Ct shows left sided iliac bone fracture, left superior pubic rami fracture and acetabulum, +age indeterminant left superior/inferior pubic rami fracture, likely subacute, +L4 hemangioma  Orthopedic evaluation appreciated  no surgical evaluation  pain control  PT evaluation recommends RADHA

## 2021-06-16 NOTE — DISCHARGE NOTE PROVIDER - CARE PROVIDER_API CALL
Tasha Owen)  Internal Medicine  [address] [place] [address]  Phone: [phone]  Fax: [phone]  Follow Up Time:

## 2021-06-16 NOTE — DISCHARGE NOTE NURSING/CASE MANAGEMENT/SOCIAL WORK - PATIENT PORTAL LINK FT
You can access the FollowMyHealth Patient Portal offered by [facility] by registering at the following website: [email or web address]. By joining [facility]’s FollowMyHealth portal, you will also be able to view your health information using other applications (apps) compatible with our system.

## 2021-06-16 NOTE — PROGRESS NOTE ADULT - SUBJECTIVE AND OBJECTIVE BOX
[facility] Division of Hospital Medicine  Tasha Owen MD  Pager (M-F, 8A-5P): [phone]  Other Times:  [phone]    Patient is a 89y old  Female who presents with a chief complaint of s/p fall (2021 03:48)      SUBJECTIVE / OVERNIGHT EVENTS:  no acute events overnight  c/o pain due to fractures  describes mechanical fall    ADDITIONAL REVIEW OF SYSTEMS:    MEDICATIONS  (STANDING):  aspirin enteric coated 81 milliGRAM(s) Oral daily  citalopram 10 milliGRAM(s) Oral daily  heparin   Injectable 5000 Unit(s) SubCutaneous every 8 hours  metoprolol succinate ER 12.5 milliGRAM(s) Oral daily    MEDICATIONS  (PRN):  acetaminophen   Tablet .. 650 milliGRAM(s) Oral every 6 hours PRN Temp greater or equal to 38C (100.4F), Mild Pain (1 - 3)  acetaminophen   Tablet .. 650 milliGRAM(s) Oral every 6 hours PRN Mild Pain (1 - 3)  traMADol 25 milliGRAM(s) Oral every 6 hours PRN Severe Pain (7 - 10)      CAPILLARY BLOOD GLUCOSE        I&O's Summary      PHYSICAL EXAM:  Vital Signs Last 24 Hrs  T(C): 37.1 (2021 07:57), Max: 37.2 (2021 03:23)  T(F): 98.7 (2021 07:57), Max: 98.9 (2021 03:23)  HR: 76 (2021 07:57) (70 - 128)  BP: 106/72 (2021 07:57) (99/73 - 122/69)  BP(mean): --  RR: 18 (2021 07:57) (12 - 19)  SpO2: 96% (2021 07:57) (96% - 100%)  Constitutional: NAD, awake and alert  HEAD: +bruising left forehead  EYES: EOMI  ENT:  Normal Hearing, no tonsillar exudates   Neck: Soft and supple, No JVD  Lungs: Breath sounds are clear bilaterally, No wheezing, rales or rhonchi  Heart: S1 and S2, regular rate and rhythm, no Murmurs, gallops or rubs  Abdomen: Bowel Sounds present, soft, nontender, nondistended, no guarding, no rebound  Extremities: No cyanosis or clubbing; warm to touch  Vascular: 2+ peripheral pulses lower ex  Neurological: A/O x 3, no focal deficits  Musculoskeletal: not moving lower extremity due to pain; +mild tenderness left hip  Skin: No rashes  Psych: no depression or anhedonia  HEME: no bruises, no nose bleeds    LABS:                        12.0   10.56 )-----------( 165      ( 2021 05:45 )             37.3     06-16    146<H>  |  112<H>  |  28<H>  ----------------------------<  124<H>  4.0   |  21<L>  |  0.89    Ca    8.4      2021 05:44  Phos  3.6     -  Mg     2.1     -16    TPro  5.8<L>  /  Alb  3.2<L>  /  TBili  0.4  /  DBili  x   /  AST  17  /  ALT  12  /  AlkPhos  78  06-16          Urinalysis Basic - ( 2021 00:57 )    Color: Yellow / Appearance: Slightly Turbid / S.019 / pH: x  Gluc: x / Ketone: Negative  / Bili: Negative / Urobili: Negative   Blood: x / Protein: 30 mg/dL / Nitrite: Positive   Leuk Esterase: Large / RBC: 3 /hpf /  /HPF   Sq Epi: x / Non Sq Epi: 0 /hpf / Bacteria: Many          RADIOLOGY & ADDITIONAL TESTS:  Results Reviewed:   Imaging Personally Reviewed:  Electrocardiogram Personally Reviewed:    COORDINATION OF CARE:  Care Discussed with Consultants/Other Providers [Y/N]:  Prior or Outpatient Records Reviewed [Y/N]:   [facility] Division of Hospital Medicine  Tasha Owen MD  Pager (M-F, 8A-5P): [phone]  Other Times:  [phone]    Patient is a 89y old  Female who presents with a chief complaint of s/p fall (2021 03:48)      SUBJECTIVE / OVERNIGHT EVENTS:  no acute events overnight  c/o pain due to fractures  describes mechanical fall    ADDITIONAL REVIEW OF SYSTEMS:    MEDICATIONS  (STANDING):  aspirin enteric coated 81 milliGRAM(s) Oral daily  citalopram 10 milliGRAM(s) Oral daily  heparin   Injectable 5000 Unit(s) SubCutaneous every 8 hours  metoprolol succinate ER 12.5 milliGRAM(s) Oral daily    MEDICATIONS  (PRN):  acetaminophen   Tablet .. 650 milliGRAM(s) Oral every 6 hours PRN Temp greater or equal to 38C (100.4F), Mild Pain (1 - 3)  acetaminophen   Tablet .. 650 milliGRAM(s) Oral every 6 hours PRN Mild Pain (1 - 3)  traMADol 25 milliGRAM(s) Oral every 6 hours PRN Severe Pain (7 - 10)      CAPILLARY BLOOD GLUCOSE        I&O's Summary      PHYSICAL EXAM:  Vital Signs Last 24 Hrs  T(C): 37.1 (2021 07:57), Max: 37.2 (2021 03:23)  T(F): 98.7 (2021 07:57), Max: 98.9 (2021 03:23)  HR: 76 (2021 07:57) (70 - 128)  BP: 106/72 (2021 07:57) (99/73 - 122/69)  BP(mean): --  RR: 18 (2021 07:57) (12 - 19)  SpO2: 96% (2021 07:57) (96% - 100%)  Constitutional: NAD, awake and alert  HEAD: +bruising left forehead  EYES: EOMI  ENT:  Normal Hearing, no tonsillar exudates   Neck: Soft and supple, No JVD  Lungs: Breath sounds are clear bilaterally, No wheezing, rales or rhonchi  Heart: S1 and S2, regular rate and rhythm, no Murmurs, gallops or rubs  Abdomen: Bowel Sounds present, soft, nontender, nondistended, no guarding, no rebound  Extremities: No cyanosis or clubbing; warm to touch  Vascular: 2+ peripheral pulses lower ex  Neurological: A/O x 3, no focal deficits  Musculoskeletal: not moving lower extremity due to pain; +mild tenderness left hip  Skin: No rashes  Psych: no depression or anhedonia  HEME: no bruises, no nose bleeds    LABS:                        12.0   10.56 )-----------( 165      ( 2021 05:45 )             37.3     06-16    146<H>  |  112<H>  |  28<H>  ----------------------------<  124<H>  4.0   |  21<L>  |  0.89    Ca    8.4      2021 05:44  Phos  3.6     -16  Mg     2.1     -16    TPro  5.8<L>  /  Alb  3.2<L>  /  TBili  0.4  /  DBili  x   /  AST  17  /  ALT  12  /  AlkPhos  78  -16          Urinalysis Basic - ( 2021 00:57 )    Color: Yellow / Appearance: Slightly Turbid / S.019 / pH: x  Gluc: x / Ketone: Negative  / Bili: Negative / Urobili: Negative   Blood: x / Protein: 30 mg/dL / Nitrite: Positive   Leuk Esterase: Large / RBC: 3 /hpf /  /HPF   Sq Epi: x / Non Sq Epi: 0 /hpf / Bacteria: Many          RADIOLOGY & ADDITIONAL TESTS:  Results Reviewed:   Imaging Personally Reviewed:  < from: CT 3D Reconstruct w/ Workstation (06.15.21 @ 23:56) >  Acute minimally displaced fracture of the left iliac bone extending to the level of the posterior sacroiliac joint. Acute minimally displaced fracture at the junction of left superior pubic ramus and acetabulum. Age-indeterminate fractures of left superior and inferior.    I, possibly subacute.    < end of copied text >    Electrocardiogram Personally Reviewed:    COORDINATION OF CARE:  Care Discussed with Consultants/Other Providers [Y/N]:  Prior or Outpatient Records Reviewed [Y/N]:

## 2021-06-16 NOTE — PHYSICAL THERAPY INITIAL EVALUATION ADULT - ADDITIONAL COMMENTS
Patient lives in pvt house with daughter, 2 steps to enter. can manage on one level. Patient ambulated with rolling walker independent. pt. owns rw, standard cane ,bed side commode, w/c at home.

## 2022-11-08 ENCOUNTER — INPATIENT (INPATIENT)
Facility: HOSPITAL | Age: 87
LOS: 12 days | Discharge: NURSING, REHAB OR LONG-TERM CARE | DRG: 177 | End: 2022-11-21
Attending: HOSPITALIST | Admitting: STUDENT IN AN ORGANIZED HEALTH CARE EDUCATION/TRAINING PROGRAM
Payer: MEDICARE

## 2022-11-08 VITALS
HEART RATE: 92 BPM | TEMPERATURE: 98 F | WEIGHT: 154.98 LBS | SYSTOLIC BLOOD PRESSURE: 124 MMHG | RESPIRATION RATE: 20 BRPM | OXYGEN SATURATION: 95 % | HEIGHT: 65 IN | DIASTOLIC BLOOD PRESSURE: 74 MMHG

## 2022-11-08 DIAGNOSIS — Z98.890 OTHER SPECIFIED POSTPROCEDURAL STATES: Chronic | ICD-10-CM

## 2022-11-08 DIAGNOSIS — R53.1 WEAKNESS: ICD-10-CM

## 2022-11-08 LAB
ALBUMIN SERPL ELPH-MCNC: 3.6 G/DL — SIGNIFICANT CHANGE UP (ref 3.3–5)
ALP SERPL-CCNC: 87 U/L — SIGNIFICANT CHANGE UP (ref 40–120)
ALT FLD-CCNC: 12 U/L — SIGNIFICANT CHANGE UP (ref 10–45)
ANION GAP SERPL CALC-SCNC: 8 MMOL/L — SIGNIFICANT CHANGE UP (ref 5–17)
ANION GAP SERPL CALC-SCNC: 8 MMOL/L — SIGNIFICANT CHANGE UP (ref 5–17)
AST SERPL-CCNC: 45 U/L — HIGH (ref 10–40)
BASOPHILS # BLD AUTO: 0.02 K/UL — SIGNIFICANT CHANGE UP (ref 0–0.2)
BASOPHILS NFR BLD AUTO: 0.2 % — SIGNIFICANT CHANGE UP (ref 0–2)
BILIRUB SERPL-MCNC: 0.4 MG/DL — SIGNIFICANT CHANGE UP (ref 0.2–1.2)
BUN SERPL-MCNC: 16 MG/DL — SIGNIFICANT CHANGE UP (ref 7–23)
BUN SERPL-MCNC: 18 MG/DL — SIGNIFICANT CHANGE UP (ref 7–23)
CALCIUM SERPL-MCNC: 8.2 MG/DL — LOW (ref 8.4–10.5)
CALCIUM SERPL-MCNC: 8.4 MG/DL — SIGNIFICANT CHANGE UP (ref 8.4–10.5)
CHLORIDE SERPL-SCNC: 107 MMOL/L — SIGNIFICANT CHANGE UP (ref 96–108)
CHLORIDE SERPL-SCNC: 109 MMOL/L — HIGH (ref 96–108)
CO2 SERPL-SCNC: 22 MMOL/L — SIGNIFICANT CHANGE UP (ref 22–31)
CO2 SERPL-SCNC: 24 MMOL/L — SIGNIFICANT CHANGE UP (ref 22–31)
CREAT SERPL-MCNC: 0.47 MG/DL — LOW (ref 0.5–1.3)
CREAT SERPL-MCNC: 0.48 MG/DL — LOW (ref 0.5–1.3)
EGFR: 89 ML/MIN/1.73M2 — SIGNIFICANT CHANGE UP
EGFR: 90 ML/MIN/1.73M2 — SIGNIFICANT CHANGE UP
EOSINOPHIL # BLD AUTO: 0 K/UL — SIGNIFICANT CHANGE UP (ref 0–0.5)
EOSINOPHIL NFR BLD AUTO: 0 % — SIGNIFICANT CHANGE UP (ref 0–6)
GLUCOSE SERPL-MCNC: 104 MG/DL — HIGH (ref 70–99)
GLUCOSE SERPL-MCNC: 124 MG/DL — HIGH (ref 70–99)
HCT VFR BLD CALC: 37.5 % — SIGNIFICANT CHANGE UP (ref 34.5–45)
HGB BLD-MCNC: 12.2 G/DL — SIGNIFICANT CHANGE UP (ref 11.5–15.5)
IMM GRANULOCYTES NFR BLD AUTO: 0.2 % — SIGNIFICANT CHANGE UP (ref 0–0.9)
LYMPHOCYTES # BLD AUTO: 0.78 K/UL — LOW (ref 1–3.3)
LYMPHOCYTES # BLD AUTO: 9.7 % — LOW (ref 13–44)
MAGNESIUM SERPL-MCNC: 2 MG/DL — SIGNIFICANT CHANGE UP (ref 1.6–2.6)
MCHC RBC-ENTMCNC: 30.9 PG — SIGNIFICANT CHANGE UP (ref 27–34)
MCHC RBC-ENTMCNC: 32.5 GM/DL — SIGNIFICANT CHANGE UP (ref 32–36)
MCV RBC AUTO: 94.9 FL — SIGNIFICANT CHANGE UP (ref 80–100)
MONOCYTES # BLD AUTO: 1.03 K/UL — HIGH (ref 0–0.9)
MONOCYTES NFR BLD AUTO: 12.8 % — SIGNIFICANT CHANGE UP (ref 2–14)
NEUTROPHILS # BLD AUTO: 6.2 K/UL — SIGNIFICANT CHANGE UP (ref 1.8–7.4)
NEUTROPHILS NFR BLD AUTO: 77.1 % — HIGH (ref 43–77)
NRBC # BLD: 0 /100 WBCS — SIGNIFICANT CHANGE UP (ref 0–0)
PLATELET # BLD AUTO: 251 K/UL — SIGNIFICANT CHANGE UP (ref 150–400)
POTASSIUM SERPL-MCNC: 3.9 MMOL/L — SIGNIFICANT CHANGE UP (ref 3.5–5.3)
POTASSIUM SERPL-MCNC: 5.7 MMOL/L — HIGH (ref 3.5–5.3)
POTASSIUM SERPL-SCNC: 3.9 MMOL/L — SIGNIFICANT CHANGE UP (ref 3.5–5.3)
POTASSIUM SERPL-SCNC: 5.7 MMOL/L — HIGH (ref 3.5–5.3)
PROT SERPL-MCNC: 6.1 G/DL — SIGNIFICANT CHANGE UP (ref 6–8.3)
RBC # BLD: 3.95 M/UL — SIGNIFICANT CHANGE UP (ref 3.8–5.2)
RBC # FLD: 13.5 % — SIGNIFICANT CHANGE UP (ref 10.3–14.5)
SARS-COV-2 RNA SPEC QL NAA+PROBE: DETECTED
SODIUM SERPL-SCNC: 137 MMOL/L — SIGNIFICANT CHANGE UP (ref 135–145)
SODIUM SERPL-SCNC: 141 MMOL/L — SIGNIFICANT CHANGE UP (ref 135–145)
WBC # BLD: 8.05 K/UL — SIGNIFICANT CHANGE UP (ref 3.8–10.5)
WBC # FLD AUTO: 8.05 K/UL — SIGNIFICANT CHANGE UP (ref 3.8–10.5)

## 2022-11-08 PROCEDURE — 71045 X-RAY EXAM CHEST 1 VIEW: CPT | Mod: 26

## 2022-11-08 PROCEDURE — 93010 ELECTROCARDIOGRAM REPORT: CPT

## 2022-11-08 PROCEDURE — 99285 EMERGENCY DEPT VISIT HI MDM: CPT | Mod: FS,CS

## 2022-11-08 PROCEDURE — 99223 1ST HOSP IP/OBS HIGH 75: CPT

## 2022-11-08 RX ORDER — ACETAMINOPHEN 500 MG
650 TABLET ORAL EVERY 6 HOURS
Refills: 0 | Status: DISCONTINUED | OUTPATIENT
Start: 2022-11-08 | End: 2022-11-20

## 2022-11-08 RX ORDER — SODIUM CHLORIDE 9 MG/ML
500 INJECTION INTRAMUSCULAR; INTRAVENOUS; SUBCUTANEOUS ONCE
Refills: 0 | Status: COMPLETED | OUTPATIENT
Start: 2022-11-08 | End: 2022-11-08

## 2022-11-08 RX ADMIN — SODIUM CHLORIDE 500 MILLILITER(S): 9 INJECTION INTRAMUSCULAR; INTRAVENOUS; SUBCUTANEOUS at 18:53

## 2022-11-08 RX ADMIN — SODIUM CHLORIDE 500 MILLILITER(S): 9 INJECTION INTRAMUSCULAR; INTRAVENOUS; SUBCUTANEOUS at 20:00

## 2022-11-08 NOTE — H&P ADULT - PROBLEM SELECTOR PLAN 2
- 2/2 dehydration, decreased PO intake and COVID19  - Reported over 20 falls in the past  - PT eval in AM  - Fall precautions  - Encourage PO intake

## 2022-11-08 NOTE — ED PROVIDER NOTE - OBJECTIVE STATEMENT
89F w/ h/o multiple falls, TIA, Tachycardia (B-blocked), cardiac aneurysm (s/p warfarin), depression, presents with cough. Pt lives at home with daughters, 1 of which tested covid+ last week. Pt developed cough last night, today worsened with decreased PO and sig. fatigue now unable to ambulate with walker+ assistance. Denies CP, SOB, fever/chills, abd pain, N/V, dysuria..

## 2022-11-08 NOTE — ED PROVIDER NOTE - ATTENDING CONTRIBUTION TO CARE
I, Kanchan Evangelista, performed a history and physical exam of the patient and discussed their management with the resident and /or advanced care provider. I reviewed the resident and /or ACP's note and agree with the documented findings and plan of care. I was present and available for all procedures.     89-year-old female history of depression, frequent falls, tachycardia on beta-blockers, cardiac aneurysm (previously on Coumadin no longer), TIAs presenting to the ED with complaints of cough.  Patient lives at home with her 2 daughters 1 of which tested positive for COVID several days ago.  Patient developed nonproductive cough last night, no fevers or chills denies shortness of breath.  1 daughter present at bedside reports that patient has a frequency to have decreased p.o. intake.  Today patient was generally weak, usually ambulates with a walker and assistance today even with assistance in the walker patient was wobbly.  No falls.  No abdominal pain, nausea or vomiting.  Denies dysuria or hematuria.  No lower extremity edema on exam.  Patient well-appearing in no acute distress, dry appearing nontachycardic normotensive saturating well on room air without increased work of breathing lungs clear to auscultation bilaterally.  Abdomen soft nontender no rebound or guarding.  Patient likely COVID-positive given daughter is at home.  Will obtain labs to evaluate for electrolyte derangements, COVID test, IV hydration and reassess.

## 2022-11-08 NOTE — ED PROVIDER NOTE - PHYSICAL EXAMINATION
GENERAL: in no acute distresss  HEENT: normal conjunctiva, +dry oral mucosa, uvula midline, no tonsilar exudates, no JVD  CARDIAC: regular rate and rhythm, normal S1S2, no appreciable murmurs, 2+ pulses in UE/LE b/l  PULM: normal breath sounds, clear to ascultation bilaterally, no rales, rhonchi, wheezing  GI: Abd soft, nondistended, nontender, no rebound tenderness, no guarding, no rigidity  : no CVA tenderness b/l, no suprapubic tenderness  NEURO: normal speech, AAOx3  MSK: no peripheral edema, no calf tenderness b/l  SKIN: well-perfused, extremities warm, no visible rashes  PSYCH: appropriate mood and affect

## 2022-11-08 NOTE — ED ADULT NURSE NOTE - OBJECTIVE STATEMENT
11/8: A&Ox4, vitals stable on triage. Pt c/o increased generalized weakness and cough x2 days.  Reports daughter who she lives with is covid positive.  denies: fever, sob, cp, n/v/d.  Pt ambulates with walker and due to increased weakness is unable to ambulate with a steady gait.  Evaluated, tx in progress. JOELLEN Vásquez.

## 2022-11-08 NOTE — ED PROVIDER NOTE - CLINICAL SUMMARY MEDICAL DECISION MAKING FREE TEXT BOX
89F w/ h/o multiple falls, TIA, Tachycardia (B-blocked), cardiac aneurysm (s/p warfarin), depression, presents with cough. Pt lives at home with daughters, 1 of which tested covid+ last week. Pt developed cough last night, today worsened with decreased PO and sig. fatigue now unable to ambulate with walker+ assistance. Denies CP, SOB, fever/chills, abd pain, N/V, dysuria. AVSS, mucus membranes dry, lungs cta b/l, RRR. Likely Covid+ vs URI. Will get labs, swab, EKG, CXR, give meds and reassess.

## 2022-11-08 NOTE — H&P ADULT - NSHPLABSRESULTS_GEN_ALL_CORE
LABS:                      12.2   8.05  )-----------( 251      ( 08 Nov 2022 19:24 )             37.5     11-08    141  |  109<H>  |  16  ----------------------------<  124<H>  3.9   |  24  |  0.48<L>    Ca    8.2<L>      08 Nov 2022 21:47  Mg     2.0     11-08    TPro  6.1  /  Alb  3.6  /  TBili  0.4  /  DBili  x   /  AST  45<H>  /  ALT  12  /  AlkPhos  87  11-08    LIVER FUNCTIONS - ( 08 Nov 2022 19:24 )  Alb: 3.6 g/dL / Pro: 6.1 g/dL / ALK PHOS: 87 U/L / ALT: 12 U/L / AST: 45 U/L / GGT: x           IMAGING:  Xray Chest 1 View AP/PA (11.08.22 @ 20:54)  IMPRESSION:  - Clear lungs.    [X] Imaging personally reviewed by me  [X] ECG personally reviewed by me- sinus tach, no ischemic changes

## 2022-11-08 NOTE — ED ADULT TRIAGE NOTE - HEIGHT IN INCHES
5 Note Text (......Xxx Chief Complaint.): This diagnosis correlates with the Other (Free Text): Patient has excision scheduled with Dr. Gallego in 1 week, advised to reschedule for 2 weeks later to allow inflammation to subside. Render Risk Assessment In Note?: no Detail Level: Simple

## 2022-11-08 NOTE — H&P ADULT - NSHPREVIEWOFSYSTEMS_GEN_ALL_CORE
CONSTITUTIONAL: Fatigue. No fever, weight loss  EYES: No eye pain, visual disturbances, or discharge  ENMT: No difficulty hearing, tinnitus, vertigo; No sinus or throat pain  RESPIRATORY: Cough. No SOB. No wheezing, chills or hemoptysis  CARDIOVASCULAR: No chest pain, palpitations, dizziness, or leg swelling  GASTROINTESTINAL: No abdominal or epigastric pain. No nausea, vomiting, or hematemesis; No diarrhea or constipation. No melena or hematochezia.  GENITOURINARY: No dysuria, frequency, hematuria, or incontinence  NEUROLOGICAL: No headaches, memory loss, loss of strength, numbness, or tremors  SKIN: No itching, burning, rashes, or lesions   LYMPH NODES: No enlarged glands  ENDOCRINE: No heat or cold intolerance; No hair loss  MUSCULOSKELETAL: No joint pain or swelling; No muscle, back pain  PSYCHIATRIC: Depression. No anxiety, mood swings, or difficulty sleeping  HEME/LYMPH: No easy bruising, or bleeding gums

## 2022-11-08 NOTE — H&P ADULT - NSICDXPASTMEDICALHX_GEN_ALL_CORE_FT
PAST MEDICAL HISTORY:  Bladder disorder dropped bladder    Depression     History of cardiac aneurysm     History of sinus tachycardia     Multiple falls     Palpitation     TIA (transient ischemic attack)

## 2022-11-08 NOTE — ED ADULT TRIAGE NOTE - SOURCE OF INFORMATION
Class I (easy) - visualization of the soft palate, fauces, uvula, and both anterior and posterior pillars EMS Class II - visualization of the soft palate, fauces, and uvula

## 2022-11-08 NOTE — ED PROVIDER NOTE - PROGRESS NOTE DETAILS
Cheli RIZO: labs and imaging reviewed. patient unable to ambulate secondary to weakness. covid test pending. will admit patient due to weakness. discussed with attending

## 2022-11-08 NOTE — H&P ADULT - PROBLEM SELECTOR PLAN 1
- COVID19 contact at home and positive on admission  - No supplemental O2 req here  - Start Remdesivir 3 day course  - Lovenox 40mg qd for DVT ppx  - Isolation precautions

## 2022-11-08 NOTE — H&P ADULT - NSHPPHYSICALEXAM_GEN_ALL_CORE
Vital Signs Last 24 Hrs  T(C): 36.8 (08 Nov 2022 20:00), Max: 36.8 (08 Nov 2022 15:50)  T(F): 98.3 (08 Nov 2022 20:00), Max: 98.3 (08 Nov 2022 15:50)  HR: 91 (08 Nov 2022 20:00) (91 - 92)  BP: 136/78 (08 Nov 2022 20:00) (124/74 - 136/78)  RR: 17 (08 Nov 2022 20:00) (17 - 20)  SpO2: 96% (08 Nov 2022 20:00) (95% - 96%)    CONSTITUTIONAL: Well-groomed, in no apparent distress  EYES: No conjunctival or scleral injection, non-icteric;   ENMT: No external nasal lesions; MMM  NECK: Trachea midline without palpable neck mass; thyroid not enlarged and non-tender  RESPIRATORY: Breathing comfortably; no dullness to percussion; lungs CTA without wheeze/rhonchi/rales  CARDIOVASCULAR: +S1S2, RRR, no M/G/R; pedal pulses full and symmetric; no lower extremity edema  GASTROINTESTINAL: No palpable masses or tenderness, +BS throughout, no rebound/guarding; no hepatosplenomegaly; no hernia palpated  LYMPHATIC: No cervical LAD or tenderness  SKIN: No rashes or ulcers noted  NEUROLOGIC: CN II-XII intact; sensation intact in LEs b/l to light touch  PSYCHIATRIC: A&Ox3; mood and affect appropriate; appropriate insight and judgment

## 2022-11-08 NOTE — H&P ADULT - ASSESSMENT
Pt is an 88yo F w/ PMH of TIA, tachycarida, cardiac aneurysm, multiple falls and depression pw c/f URI symptoms. Pt is an 89yo F w/ PMH of TIA, tachycardia cardiac aneurysm, multiple falls and depression pw c/f URI symptoms and generalized weakness i/s/o COVID19 infection.

## 2022-11-08 NOTE — H&P ADULT - HISTORY OF PRESENT ILLNESS
Pt is an 88yo F w/ PMH of TIA, tachycarida, cardiac aneurysm, multiple falls and depression pw c/f URI symptoms.    States family member in the home w/ COVID in the home last week. on day prior to presentation pt developed cough w/ decreased PO intake and significant fatigue, unable to ambulate w/ walker.     She otherwise denies any CP, SOB, palpitations, abd pain, N/V, constipation, or diarrhea.    In the ED she was administered 500cc IVF.   Pt is an 89yo F w/ PMH of TIA, tachycarida, cardiac aneurysm, multiple falls and depression pw c/f COVID19    States family member in the home w/ COVID in the home last week. on day prior to presentation pt developed cough w/ decreased PO intake and significant fatigue, unable to ambulate w/ walker.     She otherwise denies any CP, SOB, palpitations, abd pain, N/V, constipation, or diarrhea.    In the ED she was administered 500cc IVF.

## 2022-11-09 DIAGNOSIS — G45.9 TRANSIENT CEREBRAL ISCHEMIC ATTACK, UNSPECIFIED: ICD-10-CM

## 2022-11-09 DIAGNOSIS — U07.1 COVID-19: ICD-10-CM

## 2022-11-09 DIAGNOSIS — R00.2 PALPITATIONS: ICD-10-CM

## 2022-11-09 DIAGNOSIS — Z02.9 ENCOUNTER FOR ADMINISTRATIVE EXAMINATIONS, UNSPECIFIED: ICD-10-CM

## 2022-11-09 DIAGNOSIS — F32.9 MAJOR DEPRESSIVE DISORDER, SINGLE EPISODE, UNSPECIFIED: ICD-10-CM

## 2022-11-09 DIAGNOSIS — Z79.899 OTHER LONG TERM (CURRENT) DRUG THERAPY: ICD-10-CM

## 2022-11-09 DIAGNOSIS — Z86.79 PERSONAL HISTORY OF OTHER DISEASES OF THE CIRCULATORY SYSTEM: ICD-10-CM

## 2022-11-09 DIAGNOSIS — R53.1 WEAKNESS: ICD-10-CM

## 2022-11-09 DIAGNOSIS — Z29.9 ENCOUNTER FOR PROPHYLACTIC MEASURES, UNSPECIFIED: ICD-10-CM

## 2022-11-09 LAB
A1C WITH ESTIMATED AVERAGE GLUCOSE RESULT: 5.5 % — SIGNIFICANT CHANGE UP (ref 4–5.6)
ALBUMIN SERPL ELPH-MCNC: 3.5 G/DL — SIGNIFICANT CHANGE UP (ref 3.3–5)
ALP SERPL-CCNC: 86 U/L — SIGNIFICANT CHANGE UP (ref 40–120)
ALT FLD-CCNC: 13 U/L — SIGNIFICANT CHANGE UP (ref 10–45)
ANION GAP SERPL CALC-SCNC: 11 MMOL/L — SIGNIFICANT CHANGE UP (ref 5–17)
APTT BLD: 27.8 SEC — SIGNIFICANT CHANGE UP (ref 27.5–35.5)
AST SERPL-CCNC: 20 U/L — SIGNIFICANT CHANGE UP (ref 10–40)
BASOPHILS # BLD AUTO: 0.03 K/UL — SIGNIFICANT CHANGE UP (ref 0–0.2)
BASOPHILS NFR BLD AUTO: 0.4 % — SIGNIFICANT CHANGE UP (ref 0–2)
BILIRUB SERPL-MCNC: 0.4 MG/DL — SIGNIFICANT CHANGE UP (ref 0.2–1.2)
BUN SERPL-MCNC: 14 MG/DL — SIGNIFICANT CHANGE UP (ref 7–23)
CALCIUM SERPL-MCNC: 8.7 MG/DL — SIGNIFICANT CHANGE UP (ref 8.4–10.5)
CHLORIDE SERPL-SCNC: 107 MMOL/L — SIGNIFICANT CHANGE UP (ref 96–108)
CO2 SERPL-SCNC: 22 MMOL/L — SIGNIFICANT CHANGE UP (ref 22–31)
CREAT SERPL-MCNC: 0.51 MG/DL — SIGNIFICANT CHANGE UP (ref 0.5–1.3)
EGFR: 88 ML/MIN/1.73M2 — SIGNIFICANT CHANGE UP
EOSINOPHIL # BLD AUTO: 0.02 K/UL — SIGNIFICANT CHANGE UP (ref 0–0.5)
EOSINOPHIL NFR BLD AUTO: 0.3 % — SIGNIFICANT CHANGE UP (ref 0–6)
ESTIMATED AVERAGE GLUCOSE: 111 MG/DL — SIGNIFICANT CHANGE UP (ref 68–114)
GLUCOSE SERPL-MCNC: 95 MG/DL — SIGNIFICANT CHANGE UP (ref 70–99)
HCT VFR BLD CALC: 37.8 % — SIGNIFICANT CHANGE UP (ref 34.5–45)
HGB BLD-MCNC: 12.2 G/DL — SIGNIFICANT CHANGE UP (ref 11.5–15.5)
IMM GRANULOCYTES NFR BLD AUTO: 0.4 % — SIGNIFICANT CHANGE UP (ref 0–0.9)
INR BLD: 1.22 RATIO — HIGH (ref 0.88–1.16)
LYMPHOCYTES # BLD AUTO: 0.86 K/UL — LOW (ref 1–3.3)
LYMPHOCYTES # BLD AUTO: 12.2 % — LOW (ref 13–44)
MAGNESIUM SERPL-MCNC: 1.8 MG/DL — SIGNIFICANT CHANGE UP (ref 1.6–2.6)
MCHC RBC-ENTMCNC: 30.9 PG — SIGNIFICANT CHANGE UP (ref 27–34)
MCHC RBC-ENTMCNC: 32.3 GM/DL — SIGNIFICANT CHANGE UP (ref 32–36)
MCV RBC AUTO: 95.7 FL — SIGNIFICANT CHANGE UP (ref 80–100)
MONOCYTES # BLD AUTO: 1.08 K/UL — HIGH (ref 0–0.9)
MONOCYTES NFR BLD AUTO: 15.3 % — HIGH (ref 2–14)
NEUTROPHILS # BLD AUTO: 5.04 K/UL — SIGNIFICANT CHANGE UP (ref 1.8–7.4)
NEUTROPHILS NFR BLD AUTO: 71.4 % — SIGNIFICANT CHANGE UP (ref 43–77)
NRBC # BLD: 0 /100 WBCS — SIGNIFICANT CHANGE UP (ref 0–0)
PHOSPHATE SERPL-MCNC: 2.4 MG/DL — LOW (ref 2.5–4.5)
PLATELET # BLD AUTO: 219 K/UL — SIGNIFICANT CHANGE UP (ref 150–400)
POTASSIUM SERPL-MCNC: 3.7 MMOL/L — SIGNIFICANT CHANGE UP (ref 3.5–5.3)
POTASSIUM SERPL-SCNC: 3.7 MMOL/L — SIGNIFICANT CHANGE UP (ref 3.5–5.3)
PROT SERPL-MCNC: 6.3 G/DL — SIGNIFICANT CHANGE UP (ref 6–8.3)
PROTHROM AB SERPL-ACNC: 14.1 SEC — HIGH (ref 10.5–13.4)
RBC # BLD: 3.95 M/UL — SIGNIFICANT CHANGE UP (ref 3.8–5.2)
RBC # FLD: 13.6 % — SIGNIFICANT CHANGE UP (ref 10.3–14.5)
SODIUM SERPL-SCNC: 140 MMOL/L — SIGNIFICANT CHANGE UP (ref 135–145)
WBC # BLD: 7.06 K/UL — SIGNIFICANT CHANGE UP (ref 3.8–10.5)
WBC # FLD AUTO: 7.06 K/UL — SIGNIFICANT CHANGE UP (ref 3.8–10.5)

## 2022-11-09 PROCEDURE — 93010 ELECTROCARDIOGRAM REPORT: CPT | Mod: 77

## 2022-11-09 PROCEDURE — 99232 SBSQ HOSP IP/OBS MODERATE 35: CPT

## 2022-11-09 PROCEDURE — 93010 ELECTROCARDIOGRAM REPORT: CPT

## 2022-11-09 PROCEDURE — 93306 TTE W/DOPPLER COMPLETE: CPT | Mod: 26

## 2022-11-09 RX ORDER — CITALOPRAM 10 MG/1
0.5 TABLET, FILM COATED ORAL
Qty: 0 | Refills: 0 | DISCHARGE

## 2022-11-09 RX ORDER — ASPIRIN/CALCIUM CARB/MAGNESIUM 324 MG
1 TABLET ORAL
Qty: 0 | Refills: 0 | DISCHARGE

## 2022-11-09 RX ORDER — INFLUENZA VIRUS VACCINE 15; 15; 15; 15 UG/.5ML; UG/.5ML; UG/.5ML; UG/.5ML
0.7 SUSPENSION INTRAMUSCULAR ONCE
Refills: 0 | Status: DISCONTINUED | OUTPATIENT
Start: 2022-11-09 | End: 2022-11-21

## 2022-11-09 RX ORDER — REMDESIVIR 5 MG/ML
INJECTION INTRAVENOUS
Refills: 0 | Status: COMPLETED | OUTPATIENT
Start: 2022-11-09 | End: 2022-11-11

## 2022-11-09 RX ORDER — METOPROLOL TARTRATE 50 MG
25 TABLET ORAL DAILY
Refills: 0 | Status: DISCONTINUED | OUTPATIENT
Start: 2022-11-09 | End: 2022-11-11

## 2022-11-09 RX ORDER — METOPROLOL TARTRATE 50 MG
12.5 TABLET ORAL DAILY
Refills: 0 | Status: DISCONTINUED | OUTPATIENT
Start: 2022-11-09 | End: 2022-11-09

## 2022-11-09 RX ORDER — GUAIFENESIN/DEXTROMETHORPHAN 600MG-30MG
10 TABLET, EXTENDED RELEASE 12 HR ORAL EVERY 4 HOURS
Refills: 0 | Status: DISCONTINUED | OUTPATIENT
Start: 2022-11-09 | End: 2022-11-21

## 2022-11-09 RX ORDER — METOPROLOL TARTRATE 50 MG
12.5 TABLET ORAL ONCE
Refills: 0 | Status: COMPLETED | OUTPATIENT
Start: 2022-11-09 | End: 2022-11-09

## 2022-11-09 RX ORDER — REMDESIVIR 5 MG/ML
100 INJECTION INTRAVENOUS EVERY 24 HOURS
Refills: 0 | Status: COMPLETED | OUTPATIENT
Start: 2022-11-10 | End: 2022-11-11

## 2022-11-09 RX ORDER — REMDESIVIR 5 MG/ML
200 INJECTION INTRAVENOUS EVERY 24 HOURS
Refills: 0 | Status: COMPLETED | OUTPATIENT
Start: 2022-11-09 | End: 2022-11-09

## 2022-11-09 RX ORDER — CITALOPRAM 10 MG/1
20 TABLET, FILM COATED ORAL DAILY
Refills: 0 | Status: DISCONTINUED | OUTPATIENT
Start: 2022-11-09 | End: 2022-11-21

## 2022-11-09 RX ORDER — ENOXAPARIN SODIUM 100 MG/ML
40 INJECTION SUBCUTANEOUS EVERY 24 HOURS
Refills: 0 | Status: DISCONTINUED | OUTPATIENT
Start: 2022-11-09 | End: 2022-11-10

## 2022-11-09 RX ADMIN — REMDESIVIR 500 MILLIGRAM(S): 5 INJECTION INTRAVENOUS at 10:37

## 2022-11-09 RX ADMIN — Medication 12.5 MILLIGRAM(S): at 05:30

## 2022-11-09 RX ADMIN — Medication 650 MILLIGRAM(S): at 23:00

## 2022-11-09 RX ADMIN — Medication 12.5 MILLIGRAM(S): at 17:10

## 2022-11-09 RX ADMIN — Medication 650 MILLIGRAM(S): at 21:19

## 2022-11-09 RX ADMIN — CITALOPRAM 20 MILLIGRAM(S): 10 TABLET, FILM COATED ORAL at 14:01

## 2022-11-09 RX ADMIN — ENOXAPARIN SODIUM 40 MILLIGRAM(S): 100 INJECTION SUBCUTANEOUS at 05:31

## 2022-11-09 NOTE — CHART NOTE - NSCHARTNOTEFT_GEN_A_CORE
CC: 2:1 Aflutter    Notified by RN of patient in aflutter on telemetry with a rate in the 120s. Additional vital signs stable. Unclear if patient has previous history of this, by of note, patient with history of 'tachycardia' and is on Toprol XL QD. Patient is asymptomatic at this time.     Vital Signs Last 24 Hrs  T(C): 36.9 (09 Nov 2022 04:33), Max: 37.5 (09 Nov 2022 01:00)  T(F): 98.4 (09 Nov 2022 04:33), Max: 99.5 (09 Nov 2022 01:00)  HR: 125 (09 Nov 2022 04:33) (70 - 125)  BP: 127/82 (09 Nov 2022 04:33) (124/74 - 136/78)  BP(mean): --  RR: 18 (09 Nov 2022 04:33) (17 - 20)  SpO2: 93% (09 Nov 2022 04:33) (93% - 100%)    Parameters below as of 09 Nov 2022 04:33  Patient On (Oxygen Delivery Method): room air    PHYSICAL EXAM:   CONSTITUTIONAL: Well-groomed, in no apparent distress; A&Ox3  NECK: Trachea midline without palpable neck mass; thyroid not enlarged and non-tender  RESPIRATORY: Breathing comfortably; no dullness to percussion; lungs CTA without wheeze/rhonchi/rales  CARDIOVASCULAR: +S1S2, tachycardic; no M/G/R; pedal pulses full and symmetric; no lower extremity edema  GASTROINTESTINAL: No palpable masses or tenderness, +BS throughout, no rebound/guarding; no hepatosplenomegaly; no hernia palpated  SKIN: No rashes or ulcers noted    ASSESSMENT: Pt is an 89yo F w/ PMH of TIA, tachycardia cardiac aneurysm, multiple falls and depression pw c/f URI symptoms and generalized weakness i/s/o COVID19 infection. Patient now in aflutter on telemetry w/ rates in the 120s.    1. Aflutter  - EKG performed STAT; showing 2:1 aflutter w/ rate in the 120s  - C/w Toprol XL 12.5mg QD as ordered w/ hold parameters; will give AM dose STAT  - Remains on prophylactic Lovenox at this time as patient has history of falls (>24 falls in the last year)  - TTE ordered  - Consider cardio consult in AM for further work-up and recommendations  - Continue telemetry monitoring  - Will continue to monitor vitals closely    Ana María Iqbal CC: 2:1 Aflutter    Notified by RN of patient in aflutter on telemetry with a rate in the 120s. Additional vital signs stable. Unclear if patient has previous history of afib/aflutter, but of note, patient with history of 'tachycardia' and is on Toprol XL QD. Patient is asymptomatic at this time.     Vital Signs Last 24 Hrs  T(C): 36.9 (09 Nov 2022 04:33), Max: 37.5 (09 Nov 2022 01:00)  T(F): 98.4 (09 Nov 2022 04:33), Max: 99.5 (09 Nov 2022 01:00)  HR: 125 (09 Nov 2022 04:33) (70 - 125)  BP: 127/82 (09 Nov 2022 04:33) (124/74 - 136/78)  BP(mean): --  RR: 18 (09 Nov 2022 04:33) (17 - 20)  SpO2: 93% (09 Nov 2022 04:33) (93% - 100%)    Parameters below as of 09 Nov 2022 04:33  Patient On (Oxygen Delivery Method): room air    PHYSICAL EXAM:   CONSTITUTIONAL: Well-groomed, in no apparent distress; A&Ox3  NECK: Trachea midline without palpable neck mass; thyroid not enlarged and non-tender  RESPIRATORY: Breathing comfortably; no dullness to percussion; lungs CTA without wheeze/rhonchi/rales  CARDIOVASCULAR: +S1S2, tachycardic; no M/G/R; pedal pulses full and symmetric; no lower extremity edema  GASTROINTESTINAL: No palpable masses or tenderness, +BS throughout, no rebound/guarding; no hepatosplenomegaly; no hernia palpated  SKIN: No rashes or ulcers noted    ASSESSMENT: Pt is an 89yo F w/ PMH of TIA, tachycardia cardiac aneurysm, multiple falls and depression pw c/f URI symptoms and generalized weakness i/s/o COVID19 infection. Patient now in aflutter on telemetry w/ rates in the 120s.    1. Aflutter  - EKG performed STAT; showing 2:1 aflutter w/ rate in the 120s  - C/w Toprol XL 12.5mg QD as ordered w/ hold parameters; will give AM dose STAT  - Remains on prophylactic Lovenox at this time as patient has history of frequent falls (>24 falls in the last year)  - TTE ordered  - Consider cardio consult in AM for further work-up and recommendations  - Continue telemetry monitoring  - Will continue to monitor vitals closely    Ana María RIZO  Medicine CC: 2:1 Aflutter    Notified by RN of patient in aflutter on telemetry with a rate in the 120s. Additional vital signs stable; afebrile on RA. Unclear if patient has previous history of afib/aflutter, but of note, patient with history of 'tachycardia' and is on Toprol XL QD. Patient is asymptomatic at this time.     Vital Signs Last 24 Hrs  T(C): 36.9 (09 Nov 2022 04:33), Max: 37.5 (09 Nov 2022 01:00)  T(F): 98.4 (09 Nov 2022 04:33), Max: 99.5 (09 Nov 2022 01:00)  HR: 125 (09 Nov 2022 04:33) (70 - 125)  BP: 127/82 (09 Nov 2022 04:33) (124/74 - 136/78)  BP(mean): --  RR: 18 (09 Nov 2022 04:33) (17 - 20)  SpO2: 93% (09 Nov 2022 04:33) (93% - 100%)    Parameters below as of 09 Nov 2022 04:33  Patient On (Oxygen Delivery Method): room air    PHYSICAL EXAM:   CONSTITUTIONAL: Well-groomed, in no apparent distress; A&Ox3  NECK: Trachea midline without palpable neck mass; thyroid not enlarged and non-tender  RESPIRATORY: Breathing comfortably; no dullness to percussion; lungs CTA without wheeze/rhonchi/rales  CARDIOVASCULAR: +S1S2, tachycardic; no M/G/R; pedal pulses full and symmetric; no lower extremity edema  GASTROINTESTINAL: No palpable masses or tenderness, +BS throughout, no rebound/guarding; no hepatosplenomegaly; no hernia palpated  SKIN: No rashes or ulcers noted    ASSESSMENT: Pt is an 89yo F w/ PMH of TIA, tachycardia cardiac aneurysm, multiple falls and depression pw c/f URI symptoms and generalized weakness i/s/o COVID19 infection. Patient now in aflutter on telemetry w/ rates in the 120s.    1. Aflutter  - EKG performed STAT; showing 2:1 aflutter w/ rate in the 120s  - C/w Toprol XL 12.5mg QD as ordered w/ hold parameters; will give AM dose STAT  - Remains on prophylactic Lovenox at this time as patient has history of frequent falls (>24 falls in the last year)  - TTE ordered  - Consider cardio consult in AM for further work-up and recommendations  - Discussed with hospitalist Fidelia Villarreal; in agreement with above plan  - Continue telemetry monitoring  - Will continue to monitor vitals closely    Ana María Iqbal

## 2022-11-09 NOTE — PHYSICAL THERAPY INITIAL EVALUATION ADULT - BALANCE TRAINING, PT EVAL
GOAL: Patient will improve static and dynamic standing balance by 1/2 grade using least restrictive device within 2 weeks to improve safety and decrease risk of falls.

## 2022-11-09 NOTE — PROGRESS NOTE ADULT - SUBJECTIVE AND OBJECTIVE BOX
Stan Wiley MD    Patient is a 89y old  Female who presents with a chief complaint of COVID (08 Nov 2022 23:22)        SUBJECTIVE / OVERNIGHT EVENTS: Patient feels well- no acute complaints. Episode of AF this morning, now reportedly in ST  TELEMETRY: ST/AF 's      MEDICATIONS  (STANDING):  citalopram 20 milliGRAM(s) Oral daily  enoxaparin Injectable 40 milliGRAM(s) SubCutaneous every 24 hours  metoprolol succinate ER 12.5 milliGRAM(s) Oral daily  remdesivir  IVPB   IV Intermittent     MEDICATIONS  (PRN):  acetaminophen     Tablet .. 650 milliGRAM(s) Oral every 6 hours PRN Temp greater or equal to 38C (100.4F), Mild Pain (1 - 3)  guaifenesin/dextromethorphan Oral Liquid 10 milliLiter(s) Oral every 4 hours PRN Cough      Vital Signs Last 24 Hrs  T(C): 36.9 (09 Nov 2022 11:45), Max: 37.5 (09 Nov 2022 01:00)  T(F): 98.5 (09 Nov 2022 11:45), Max: 99.5 (09 Nov 2022 01:00)  HR: 91 (09 Nov 2022 11:45) (70 - 125)  BP: 129/75 (09 Nov 2022 11:45) (124/74 - 136/78)  BP(mean): --  RR: 18 (09 Nov 2022 11:45) (17 - 20)  SpO2: 95% (09 Nov 2022 11:45) (93% - 100%)    Parameters below as of 09 Nov 2022 11:45  Patient On (Oxygen Delivery Method): room air      CAPILLARY BLOOD GLUCOSE        I&O's Summary        PHYSICAL EXAM  GENERAL: NAD, well-developed  HEAD:  Atraumatic, normocephalic  EYES: EOMI, PERRLA, conjunctiva and sclera clear  CHEST/LUNG: Clear to auscultation bilaterally; no wheezes  HEART: +S1+S2, tachy  ABDOMEN: Soft, nontender, nondistended; bowel sounds present  EXTREMITIES:  2+ peripheral pulses; no clubbing, cyanosis, or edema  PSYCH: AAOx3      LABS:                        12.2   7.06  )-----------( 219      ( 09 Nov 2022 05:46 )             37.8     11-09    140  |  107  |  14  ----------------------------<  95  3.7   |  22  |  0.51    Ca    8.7      09 Nov 2022 05:46  Phos  2.4     11-09  Mg     1.8     11-09    TPro  6.3  /  Alb  3.5  /  TBili  0.4  /  DBili  x   /  AST  20  /  ALT  13  /  AlkPhos  86  11-09    PT/INR - ( 09 Nov 2022 05:46 )   PT: 14.1 sec;   INR: 1.22 ratio         PTT - ( 09 Nov 2022 05:46 )  PTT:27.8 sec            RADIOLOGY & ADDITIONAL TESTS:    Imaging Personally Reviewed:  Consultant(s) Notes Reviewed:    Care Discussed with Consultants/Other Providers:

## 2022-11-09 NOTE — PROGRESS NOTE ADULT - PROBLEM SELECTOR PLAN 1
Not requiring supplemental O2  Remdesivir 3 day course  Lovenox 40 mg SQ daily for DVT ppx  Isolation precautions

## 2022-11-09 NOTE — PHYSICAL THERAPY INITIAL EVALUATION ADULT - ADDITIONAL COMMENTS
Pt lives in private house with her daughter, ~5 steps to enter no handrail, lives on first floor. Pt previously performed some ADLs independently, needed intermittent assist from daughter. Amb within home using RW. Owns DME: RW, shower chair, cane, w/c, commode. Reports multiple falls within the last year.

## 2022-11-09 NOTE — PHYSICAL THERAPY INITIAL EVALUATION ADULT - GAIT TRAINING, PT EVAL
GOAL: Pt will ambulate x150 ft w CGA using least restrictive device without loss of balance, within 2 weeks.

## 2022-11-09 NOTE — PHYSICAL THERAPY INITIAL EVALUATION ADULT - LEVEL OF INDEPENDENCE: GAIT, REHAB EVAL
initiated small sideways shuffling steps, unable to complete 2/2 b/l knee buckling/unable to perform moderate assist (50% patients effort)

## 2022-11-09 NOTE — PHYSICAL THERAPY INITIAL EVALUATION ADULT - BED MOBILITY TRAINING, PT EVAL
GOAL: Pt will independently perform all aspects of bed mobility to help prevent pressure ulcers, by 2 weeks

## 2022-11-09 NOTE — PHYSICAL THERAPY INITIAL EVALUATION ADULT - TRANSFER TRAINING, PT EVAL
GOAL: Pt will independently perform sit to/from stand transfers from various surfaces without LOB using least restrictive device by 2 weeks.

## 2022-11-09 NOTE — PHYSICAL THERAPY INITIAL EVALUATION ADULT - GAIT DEVIATIONS NOTED, PT EVAL
decreased galen/increased time in double stance/decreased step length/decreased stride length/decreased weight-shifting ability

## 2022-11-09 NOTE — PROGRESS NOTE ADULT - PROBLEM SELECTOR PLAN 4
Patient with sinus tachycardia and with episode of 2:1 a flutter this am  Hx palpitations and ST on beta-blocker at home  Increase Toprol to 25 mg PO daily  TTE ordered

## 2022-11-09 NOTE — PHYSICAL THERAPY INITIAL EVALUATION ADULT - PERTINENT HX OF CURRENT PROBLEM, REHAB EVAL
Pt is an 88yo F w/ PMH of TIA, tachycardia cardiac aneurysm, multiple falls and depression pw c/f URI symptoms and generalized weakness i/s/o COVID19 infection. Noted with aflutter on tele rate in 120s. Pt remains asymptomatic at this time. Pt is an 89yo F w/ PMH of TIA, tachycardia cardiac aneurysm, multiple falls and depression pw c/f URI symptoms and generalized weakness i/s/o COVID19 infection. Noted with aflutter on tele rate in 120s. Pt remains asymptomatic at this time. HR stable in 90's.

## 2022-11-10 LAB
APPEARANCE UR: ABNORMAL
BACTERIA # UR AUTO: ABNORMAL
BILIRUB UR-MCNC: NEGATIVE — SIGNIFICANT CHANGE UP
COLOR SPEC: YELLOW — SIGNIFICANT CHANGE UP
D DIMER BLD IA.RAPID-MCNC: 461 NG/ML DDU — HIGH
DIFF PNL FLD: ABNORMAL
EPI CELLS # UR: 2 /HPF — SIGNIFICANT CHANGE UP
GLUCOSE UR QL: NEGATIVE — SIGNIFICANT CHANGE UP
HYALINE CASTS # UR AUTO: 1 /LPF — SIGNIFICANT CHANGE UP (ref 0–2)
KETONES UR-MCNC: ABNORMAL
LEUKOCYTE ESTERASE UR-ACNC: ABNORMAL
NITRITE UR-MCNC: POSITIVE
PH UR: 6.5 — SIGNIFICANT CHANGE UP (ref 5–8)
PROT UR-MCNC: ABNORMAL
RBC CASTS # UR COMP ASSIST: 14 /HPF — HIGH (ref 0–4)
SP GR SPEC: 1.02 — SIGNIFICANT CHANGE UP (ref 1.01–1.02)
UROBILINOGEN FLD QL: ABNORMAL
WBC UR QL: 10 /HPF — HIGH (ref 0–5)

## 2022-11-10 PROCEDURE — 99233 SBSQ HOSP IP/OBS HIGH 50: CPT

## 2022-11-10 RX ORDER — CHLORHEXIDINE GLUCONATE 213 G/1000ML
1 SOLUTION TOPICAL DAILY
Refills: 0 | Status: DISCONTINUED | OUTPATIENT
Start: 2022-11-10 | End: 2022-11-21

## 2022-11-10 RX ORDER — CEFTRIAXONE 500 MG/1
1000 INJECTION, POWDER, FOR SOLUTION INTRAMUSCULAR; INTRAVENOUS EVERY 24 HOURS
Refills: 0 | Status: DISCONTINUED | OUTPATIENT
Start: 2022-11-11 | End: 2022-11-13

## 2022-11-10 RX ORDER — CEFTRIAXONE 500 MG/1
1000 INJECTION, POWDER, FOR SOLUTION INTRAMUSCULAR; INTRAVENOUS ONCE
Refills: 0 | Status: COMPLETED | OUTPATIENT
Start: 2022-11-10 | End: 2022-11-10

## 2022-11-10 RX ORDER — APIXABAN 2.5 MG/1
5 TABLET, FILM COATED ORAL EVERY 12 HOURS
Refills: 0 | Status: DISCONTINUED | OUTPATIENT
Start: 2022-11-10 | End: 2022-11-18

## 2022-11-10 RX ORDER — METOPROLOL TARTRATE 50 MG
5 TABLET ORAL ONCE
Refills: 0 | Status: COMPLETED | OUTPATIENT
Start: 2022-11-10 | End: 2022-11-10

## 2022-11-10 RX ORDER — IPRATROPIUM/ALBUTEROL SULFATE 18-103MCG
3 AEROSOL WITH ADAPTER (GRAM) INHALATION ONCE
Refills: 0 | Status: COMPLETED | OUTPATIENT
Start: 2022-11-10 | End: 2022-11-10

## 2022-11-10 RX ORDER — CEFTRIAXONE 500 MG/1
INJECTION, POWDER, FOR SOLUTION INTRAMUSCULAR; INTRAVENOUS
Refills: 0 | Status: DISCONTINUED | OUTPATIENT
Start: 2022-11-10 | End: 2022-11-13

## 2022-11-10 RX ADMIN — REMDESIVIR 500 MILLIGRAM(S): 5 INJECTION INTRAVENOUS at 11:05

## 2022-11-10 RX ADMIN — Medication 5 MILLIGRAM(S): at 21:51

## 2022-11-10 RX ADMIN — Medication 25 MILLIGRAM(S): at 05:10

## 2022-11-10 RX ADMIN — Medication 650 MILLIGRAM(S): at 21:51

## 2022-11-10 RX ADMIN — CITALOPRAM 20 MILLIGRAM(S): 10 TABLET, FILM COATED ORAL at 21:50

## 2022-11-10 RX ADMIN — CEFTRIAXONE 1000 MILLIGRAM(S): 500 INJECTION, POWDER, FOR SOLUTION INTRAMUSCULAR; INTRAVENOUS at 12:37

## 2022-11-10 RX ADMIN — ENOXAPARIN SODIUM 40 MILLIGRAM(S): 100 INJECTION SUBCUTANEOUS at 05:10

## 2022-11-10 RX ADMIN — APIXABAN 5 MILLIGRAM(S): 2.5 TABLET, FILM COATED ORAL at 17:38

## 2022-11-10 NOTE — PROGRESS NOTE ADULT - SUBJECTIVE AND OBJECTIVE BOX
Stan Wiley MD    Patient is a 89y old  Female who presents with a chief complaint of COVID (2022 13:09)        SUBJECTIVE / OVERNIGHT EVENTS: Patient with PAF overnight- currently in AF rate controlled. Patient without complaints  TELEMETRY AF       MEDICATIONS  (STANDING):  citalopram 20 milliGRAM(s) Oral daily  enoxaparin Injectable 40 milliGRAM(s) SubCutaneous every 24 hours  influenza  Vaccine (HIGH DOSE) 0.7 milliLiter(s) IntraMuscular once  metoprolol succinate ER 25 milliGRAM(s) Oral daily  remdesivir  IVPB   IV Intermittent   remdesivir  IVPB 100 milliGRAM(s) IV Intermittent every 24 hours    MEDICATIONS  (PRN):  acetaminophen     Tablet .. 650 milliGRAM(s) Oral every 6 hours PRN Temp greater or equal to 38C (100.4F), Mild Pain (1 - 3)  guaifenesin/dextromethorphan Oral Liquid 10 milliLiter(s) Oral every 4 hours PRN Cough      Vital Signs Last 24 Hrs  T(C): 36.8 (10 Nov 2022 04:50), Max: 37.8 (2022 20:49)  T(F): 98.2 (10 Nov 2022 04:50), Max: 100 (2022 20:49)  HR: 78 (10 Nov 2022 04:50) (78 - 95)  BP: 133/78 (10 Nov 2022 04:50) (120/82 - 134/79)  BP(mean): --  RR: 18 (10 Nov 2022 04:50) (18 - 18)  SpO2: 90% (10 Nov 2022 04:50) (90% - 95%)    Parameters below as of 10 Nov 2022 04:50  Patient On (Oxygen Delivery Method): room air      CAPILLARY BLOOD GLUCOSE        I&O's Summary    2022 07:01  -  10 Nov 2022 07:00  --------------------------------------------------------  IN: 240 mL / OUT: 0 mL / NET: 240 mL          PHYSICAL EXAM  GENERAL: NAD, well-developed  HEAD:  Atraumatic, normocephalic  EYES: EOMI, PERRLA, conjunctiva and sclera clear  CHEST/LUNG: Clear to auscultation bilaterally; no wheezes  HEART: +S1+S2, irregular  ABDOMEN: Soft, nontender, nondistended; bowel sounds present  EXTREMITIES:  2+ peripheral pulses; no clubbing, cyanosis, or edema  PSYCH: AAOx3      LABS:                        12.2   7.06  )-----------( 219      ( 2022 05:46 )             37.8         140  |  107  |  14  ----------------------------<  95  3.7   |  22  |  0.51    Ca    8.7      2022 05:46  Phos  2.4       Mg     1.8         TPro  6.3  /  Alb  3.5  /  TBili  0.4  /  DBili  x   /  AST  20  /  ALT  13  /  AlkPhos  86      PT/INR - ( 2022 05:46 )   PT: 14.1 sec;   INR: 1.22 ratio         PTT - ( 2022 05:46 )  PTT:27.8 sec      Urinalysis Basic - ( 10 Nov 2022 05:02 )    Color: Yellow / Appearance: Slightly Turbid / S.025 / pH: x  Gluc: x / Ketone: Small  / Bili: Negative / Urobili: 2 mg/dL   Blood: x / Protein: 30 mg/dL / Nitrite: Positive   Leuk Esterase: Large / RBC: 14 /hpf / WBC 10 /HPF   Sq Epi: x / Non Sq Epi: 2 /hpf / Bacteria: Many          RADIOLOGY & ADDITIONAL TESTS:    Imaging Personally Reviewed:  Consultant(s) Notes Reviewed:    Care Discussed with Consultants/Other Providers:

## 2022-11-10 NOTE — PROGRESS NOTE ADULT - PROBLEM SELECTOR PLAN 4
Hx palpitations and ST on beta-blocker at home  Now in rate controlled AF  Continue Toprol to 25 mg PO daily  TTE reviewed  Patient agreeable to AC in setting of AF- will start Eliquis 5 mg PO BID

## 2022-11-10 NOTE — CHART NOTE - NSCHARTNOTEFT_GEN_A_CORE
Notified by RN. Pt -140, /90, had her last dose of metoprolol in the am. The RN informed at the floor Pt also spiked a new fever of 101. Pt was seen and examined at the bedside, in no acute distress, sleeping. Pt denies any chest pain, palpitations, SOB, abdominal pain, nausea, vomiting, headache, or any other acute complaints at the moment.      Vital Signs Last 24 Hrs  T(C): 38.3 (10 Nov 2022 21:40), Max: 38.3 (10 Nov 2022 21:40)  T(F): 101 (10 Nov 2022 21:40), Max: 101 (10 Nov 2022 21:40)  HR: 134 (10 Nov 2022 21:40) (78 - 134)  BP: 132/92 (10 Nov 2022 21:40) (120/82 - 133/78)  RR: 20 (10 Nov 2022 21:40) (18 - 20)  SpO2: 95% (10 Nov 2022 21:40) (90% - 95%)    Parameters below as of 10 Nov 2022 21:40  Patient On (Oxygen Delivery Method): room air        Physical Exam:  General: WN/WD NAD  Neurology: A&Ox3, nonfocal, MCKEON x 4  Head:  Normocephalic, atraumatic  Respiratory: CTA B/L, congested  CV: RRR, S1S2, no murmur  Abdominal: Soft, NT, ND no palpable mass  MSK: No edema, + peripheral pulses, FROM all 4 extremity  Labs:                          12.2   7.06  )-----------( 219      ( 09 Nov 2022 05:46 )             37.8     11-09    140  |  107  |  14  ----------------------------<  95  3.7   |  22  |  0.51    Ca    8.7      09 Nov 2022 05:46  Phos  2.4     11-09  Mg     1.8     11-09    TPro  6.3  /  Alb  3.5  /  TBili  0.4  /  DBili  x   /  AST  20  /  ALT  13  /  AlkPhos  86  11-09          HPI:  Pt is an 88yo F w/ PMH of TIA, tachycarida, cardiac aneurysm, multiple falls and depression pw c/f COVID19    States family member in the home w/ COVID in the home last week. on day prior to presentation pt developed cough w/ decreased PO intake and significant fatigue, unable to ambulate w/ walker.     She otherwise denies any CP, SOB, palpitations, abd pain, N/V, constipation, or diarrhea.    In the ED she was administered 500cc IVF.   (08 Nov 2022 23:22)    Assessment:  88 y/o F w/ PMHx of TIA, tachycardia, cardiac aneurysm, positive for COVID, new atrial flutter, seen for -140 and new fever    Plan:  > PO Tylenol  > Blood Cultures x 2  > Duoneb Nebulizer  > Metoprolol IV push (post HR in 80s, broke back to SR  > Cont monitoring on telemetry and vitals q4hr  > Will reassess and endorse to day team    Magui Alvarenga PA-C  #[id number] Notified by RN. Pt -140, /90, had her last dose of metoprolol in the am. The RN informed at the floor Pt also spiked a new fever of 101. Pt was seen and examined at the bedside, in no acute distress, sleeping. Pt denies any chest pain, palpitations, SOB, abdominal pain, nausea, vomiting, headache, or any other acute complaints at the moment.      Vital Signs Last 24 Hrs  T(C): 38.3 (10 Nov 2022 21:40), Max: 38.3 (10 Nov 2022 21:40)  T(F): 101 (10 Nov 2022 21:40), Max: 101 (10 Nov 2022 21:40)  HR: 134 (10 Nov 2022 21:40) (78 - 134)  BP: 132/92 (10 Nov 2022 21:40) (120/82 - 133/78)  RR: 20 (10 Nov 2022 21:40) (18 - 20)  SpO2: 95% (10 Nov 2022 21:40) (90% - 95%)    Parameters below as of 10 Nov 2022 21:40  Patient On (Oxygen Delivery Method): room air        Physical Exam:  General: WN/WD NAD  Neurology: A&Ox3, nonfocal, MCKEON x 4  Head:  Normocephalic, atraumatic  Respiratory: CTA B/L, congested  CV: RRR, S1S2, no murmur  Abdominal: Soft, NT, ND no palpable mass  MSK: No edema, + peripheral pulses, FROM all 4 extremity  Labs:                          12.2   7.06  )-----------( 219      ( 09 Nov 2022 05:46 )             37.8     11-09    140  |  107  |  14  ----------------------------<  95  3.7   |  22  |  0.51    Ca    8.7      09 Nov 2022 05:46  Phos  2.4     11-09  Mg     1.8     11-09    TPro  6.3  /  Alb  3.5  /  TBili  0.4  /  DBili  x   /  AST  20  /  ALT  13  /  AlkPhos  86  11-09          HPI:  Pt is an 88yo F w/ PMH of TIA, tachycarida, cardiac aneurysm, multiple falls and depression pw c/f COVID19    States family member in the home w/ COVID in the home last week. on day prior to presentation pt developed cough w/ decreased PO intake and significant fatigue, unable to ambulate w/ walker.     She otherwise denies any CP, SOB, palpitations, abd pain, N/V, constipation, or diarrhea.    In the ED she was administered 500cc IVF.   (08 Nov 2022 23:22)    Assessment:  89 y/o F w/ PMHx of TIA, tachycardia, cardiac aneurysm, positive for COVID, new atrial flutter, seen for -140 and new fever    Plan:  > PO Tylenol  > Blood Cultures x 2  > Duoneb Nebulizer  > Metoprolol IV push (post HR in 80s, broke back to SR  > Cont monitoring on telemetry and vitals q4hr  > Will reassess and endorse to day team    Magui Alvarenga PA-C  #[id number]    Pt HR fluctuating in 120-170, unsustained with highest up to 178, with bp 107/64, Cardizem 5mg IV x 1 given Notified by RN. Pt -140, /90, had her last dose of metoprolol in the am. The RN informed at the floor Pt also spiked a new fever of 101. Pt was seen and examined at the bedside, in no acute distress, sleeping. Pt denies any chest pain, palpitations, SOB, abdominal pain, nausea, vomiting, headache, or any other acute complaints at the moment.      Vital Signs Last 24 Hrs  T(C): 38.3 (10 Nov 2022 21:40), Max: 38.3 (10 Nov 2022 21:40)  T(F): 101 (10 Nov 2022 21:40), Max: 101 (10 Nov 2022 21:40)  HR: 134 (10 Nov 2022 21:40) (78 - 134)  BP: 132/92 (10 Nov 2022 21:40) (120/82 - 133/78)  RR: 20 (10 Nov 2022 21:40) (18 - 20)  SpO2: 95% (10 Nov 2022 21:40) (90% - 95%)    Parameters below as of 10 Nov 2022 21:40  Patient On (Oxygen Delivery Method): room air        Physical Exam:  General: WN/WD NAD  Neurology: A&Ox3, nonfocal, MCKEON x 4  Head:  Normocephalic, atraumatic  Respiratory: CTA B/L, congested  CV: RRR, S1S2, no murmur  Abdominal: Soft, NT, ND no palpable mass  MSK: No edema, + peripheral pulses, FROM all 4 extremity  Labs:                          12.2   7.06  )-----------( 219      ( 09 Nov 2022 05:46 )             37.8     11-09    140  |  107  |  14  ----------------------------<  95  3.7   |  22  |  0.51    Ca    8.7      09 Nov 2022 05:46  Phos  2.4     11-09  Mg     1.8     11-09    TPro  6.3  /  Alb  3.5  /  TBili  0.4  /  DBili  x   /  AST  20  /  ALT  13  /  AlkPhos  86  11-09          HPI:  Pt is an 88yo F w/ PMH of TIA, tachycarida, cardiac aneurysm, multiple falls and depression pw c/f COVID19    States family member in the home w/ COVID in the home last week. on day prior to presentation pt developed cough w/ decreased PO intake and significant fatigue, unable to ambulate w/ walker.     She otherwise denies any CP, SOB, palpitations, abd pain, N/V, constipation, or diarrhea.    In the ED she was administered 500cc IVF.   (08 Nov 2022 23:22)    Assessment:  89 y/o F w/ PMHx of TIA, tachycardia, cardiac aneurysm, positive for COVID, new atrial flutter, seen for -140 and new fever    Plan:  > PO Tylenol  > Blood Cultures x 2  > Duoneb Nebulizer  > Metoprolol IV push (post HR in 80s, broke back to SR  > Cont monitoring on telemetry and vitals q4hr  > Will reassess and endorse to day team    Magui Alvarenga PA-C  #[id number]    Pt HR fluctuating in 120-170, Bp 86/59 on the left and systolic in 90s at the right arm, 500ml bolus x 1

## 2022-11-10 NOTE — PROGRESS NOTE ADULT - PROBLEM SELECTOR PLAN 2
Due to COVID-19  Also with +UA; Urine culture sent, start CTX 1 g IVSS daily x 3 days   PT eval recommending RADHA  Fall precautions

## 2022-11-11 DIAGNOSIS — N39.0 URINARY TRACT INFECTION, SITE NOT SPECIFIED: ICD-10-CM

## 2022-11-11 LAB
ALBUMIN SERPL ELPH-MCNC: 3.1 G/DL — LOW (ref 3.3–5)
ALP SERPL-CCNC: 70 U/L — SIGNIFICANT CHANGE UP (ref 40–120)
ALT FLD-CCNC: 14 U/L — SIGNIFICANT CHANGE UP (ref 10–45)
ANION GAP SERPL CALC-SCNC: 16 MMOL/L — SIGNIFICANT CHANGE UP (ref 5–17)
AST SERPL-CCNC: 23 U/L — SIGNIFICANT CHANGE UP (ref 10–40)
BILIRUB DIRECT SERPL-MCNC: 0.1 MG/DL — SIGNIFICANT CHANGE UP (ref 0–0.3)
BILIRUB INDIRECT FLD-MCNC: 0.2 MG/DL — SIGNIFICANT CHANGE UP (ref 0.2–1)
BILIRUB SERPL-MCNC: 0.3 MG/DL — SIGNIFICANT CHANGE UP (ref 0.2–1.2)
BUN SERPL-MCNC: 17 MG/DL — SIGNIFICANT CHANGE UP (ref 7–23)
CALCIUM SERPL-MCNC: 8.6 MG/DL — SIGNIFICANT CHANGE UP (ref 8.4–10.5)
CHLORIDE SERPL-SCNC: 102 MMOL/L — SIGNIFICANT CHANGE UP (ref 96–108)
CO2 SERPL-SCNC: 21 MMOL/L — LOW (ref 22–31)
CREAT SERPL-MCNC: 0.48 MG/DL — LOW (ref 0.5–1.3)
CREAT SERPL-MCNC: 0.6 MG/DL — SIGNIFICANT CHANGE UP (ref 0.5–1.3)
EGFR: 85 ML/MIN/1.73M2 — SIGNIFICANT CHANGE UP
EGFR: 89 ML/MIN/1.73M2 — SIGNIFICANT CHANGE UP
GLUCOSE SERPL-MCNC: 140 MG/DL — HIGH (ref 70–99)
HCT VFR BLD CALC: 41.9 % — SIGNIFICANT CHANGE UP (ref 34.5–45)
HGB BLD-MCNC: 13.7 G/DL — SIGNIFICANT CHANGE UP (ref 11.5–15.5)
INR BLD: 1.71 RATIO — HIGH (ref 0.88–1.16)
MAGNESIUM SERPL-MCNC: 1.8 MG/DL — SIGNIFICANT CHANGE UP (ref 1.6–2.6)
MCHC RBC-ENTMCNC: 30.4 PG — SIGNIFICANT CHANGE UP (ref 27–34)
MCHC RBC-ENTMCNC: 32.7 GM/DL — SIGNIFICANT CHANGE UP (ref 32–36)
MCV RBC AUTO: 92.9 FL — SIGNIFICANT CHANGE UP (ref 80–100)
NRBC # BLD: 0 /100 WBCS — SIGNIFICANT CHANGE UP (ref 0–0)
PHOSPHATE SERPL-MCNC: 3.6 MG/DL — SIGNIFICANT CHANGE UP (ref 2.5–4.5)
PLATELET # BLD AUTO: 221 K/UL — SIGNIFICANT CHANGE UP (ref 150–400)
POTASSIUM SERPL-MCNC: 3.1 MMOL/L — LOW (ref 3.5–5.3)
POTASSIUM SERPL-SCNC: 3.1 MMOL/L — LOW (ref 3.5–5.3)
PROT SERPL-MCNC: 5.3 G/DL — LOW (ref 6–8.3)
PROTHROM AB SERPL-ACNC: 19.9 SEC — HIGH (ref 10.5–13.4)
RBC # BLD: 4.51 M/UL — SIGNIFICANT CHANGE UP (ref 3.8–5.2)
RBC # FLD: 13.6 % — SIGNIFICANT CHANGE UP (ref 10.3–14.5)
SODIUM SERPL-SCNC: 139 MMOL/L — SIGNIFICANT CHANGE UP (ref 135–145)
WBC # BLD: 9.34 K/UL — SIGNIFICANT CHANGE UP (ref 3.8–10.5)
WBC # FLD AUTO: 9.34 K/UL — SIGNIFICANT CHANGE UP (ref 3.8–10.5)

## 2022-11-11 PROCEDURE — 99233 SBSQ HOSP IP/OBS HIGH 50: CPT

## 2022-11-11 RX ORDER — POTASSIUM CHLORIDE 20 MEQ
20 PACKET (EA) ORAL
Refills: 0 | Status: DISCONTINUED | OUTPATIENT
Start: 2022-11-11 | End: 2022-11-11

## 2022-11-11 RX ORDER — DILTIAZEM HCL 120 MG
5 CAPSULE, EXT RELEASE 24 HR ORAL ONCE
Refills: 0 | Status: DISCONTINUED | OUTPATIENT
Start: 2022-11-11 | End: 2022-11-11

## 2022-11-11 RX ORDER — METOPROLOL TARTRATE 50 MG
25 TABLET ORAL ONCE
Refills: 0 | Status: COMPLETED | OUTPATIENT
Start: 2022-11-11 | End: 2022-11-11

## 2022-11-11 RX ORDER — DEXAMETHASONE 0.5 MG/5ML
6 ELIXIR ORAL DAILY
Refills: 0 | Status: COMPLETED | OUTPATIENT
Start: 2022-11-11 | End: 2022-11-21

## 2022-11-11 RX ORDER — METOPROLOL TARTRATE 50 MG
50 TABLET ORAL DAILY
Refills: 0 | Status: DISCONTINUED | OUTPATIENT
Start: 2022-11-11 | End: 2022-11-21

## 2022-11-11 RX ORDER — SODIUM CHLORIDE 9 MG/ML
500 INJECTION INTRAMUSCULAR; INTRAVENOUS; SUBCUTANEOUS ONCE
Refills: 0 | Status: COMPLETED | OUTPATIENT
Start: 2022-11-11 | End: 2022-11-11

## 2022-11-11 RX ORDER — POTASSIUM CHLORIDE 20 MEQ
40 PACKET (EA) ORAL EVERY 4 HOURS
Refills: 0 | Status: COMPLETED | OUTPATIENT
Start: 2022-11-11 | End: 2022-11-11

## 2022-11-11 RX ORDER — MAGNESIUM SULFATE 500 MG/ML
1 VIAL (ML) INJECTION ONCE
Refills: 0 | Status: COMPLETED | OUTPATIENT
Start: 2022-11-11 | End: 2022-11-11

## 2022-11-11 RX ORDER — REMDESIVIR 5 MG/ML
100 INJECTION INTRAVENOUS EVERY 24 HOURS
Refills: 0 | Status: COMPLETED | OUTPATIENT
Start: 2022-11-12 | End: 2022-11-13

## 2022-11-11 RX ADMIN — Medication 25 MILLIGRAM(S): at 11:38

## 2022-11-11 RX ADMIN — Medication 100 GRAM(S): at 06:49

## 2022-11-11 RX ADMIN — Medication 3 MILLILITER(S): at 00:21

## 2022-11-11 RX ADMIN — Medication 40 MILLIEQUIVALENT(S): at 11:39

## 2022-11-11 RX ADMIN — CEFTRIAXONE 100 MILLIGRAM(S): 500 INJECTION, POWDER, FOR SOLUTION INTRAMUSCULAR; INTRAVENOUS at 11:40

## 2022-11-11 RX ADMIN — APIXABAN 5 MILLIGRAM(S): 2.5 TABLET, FILM COATED ORAL at 06:50

## 2022-11-11 RX ADMIN — SODIUM CHLORIDE 1000 MILLILITER(S): 9 INJECTION INTRAMUSCULAR; INTRAVENOUS; SUBCUTANEOUS at 02:59

## 2022-11-11 RX ADMIN — Medication 40 MILLIEQUIVALENT(S): at 06:49

## 2022-11-11 RX ADMIN — CHLORHEXIDINE GLUCONATE 1 APPLICATION(S): 213 SOLUTION TOPICAL at 11:37

## 2022-11-11 RX ADMIN — APIXABAN 5 MILLIGRAM(S): 2.5 TABLET, FILM COATED ORAL at 17:56

## 2022-11-11 RX ADMIN — REMDESIVIR 500 MILLIGRAM(S): 5 INJECTION INTRAVENOUS at 11:39

## 2022-11-11 RX ADMIN — Medication 25 MILLIGRAM(S): at 06:50

## 2022-11-11 RX ADMIN — CITALOPRAM 20 MILLIGRAM(S): 10 TABLET, FILM COATED ORAL at 21:08

## 2022-11-11 NOTE — PROGRESS NOTE ADULT - PROBLEM SELECTOR PLAN 3
Hx palpitations and ST on beta-blocker at home  Now in AF- rate not controlled in setting of fever and new hypoxia   Increase Toprol to 50 mg PO daily  TTE reviewed  Continue Eliquis 5 mg PO BID

## 2022-11-11 NOTE — PROGRESS NOTE ADULT - ASSESSMENT
89F PMH of TIA, tachycardia, cardiac aneurysm, and depression admitted with COVID-19 and UTI- now febrile with supp O2 requirements, new AF with RVR

## 2022-11-11 NOTE — PROGRESS NOTE ADULT - PROBLEM SELECTOR PLAN 1
Now requiring supplemental O2  Extend Remdesivir to 5 days- today day 3  Start Decadron 6 mg PO daily x 10 days  Isolation precautions

## 2022-11-11 NOTE — PROGRESS NOTE ADULT - SUBJECTIVE AND OBJECTIVE BOX
Stan Wiley MD    Patient is a 89y old  Female who presents with a chief complaint of COVID (10 Nov 2022 11:37)        SUBJECTIVE / OVERNIGHT EVENTS: Patient febrile overnight, AF with RVR, required IV diltiazem and metoprolol. Now on 2L NC supp O2. Patient without new complaints.  TELEMETRY: -140's, briefly up to 170's      MEDICATIONS  (STANDING):  apixaban 5 milliGRAM(s) Oral every 12 hours  cefTRIAXone   IVPB      cefTRIAXone   IVPB 1000 milliGRAM(s) IV Intermittent every 24 hours  chlorhexidine 2% Cloths 1 Application(s) Topical daily  citalopram 20 milliGRAM(s) Oral daily  dexAMETHasone     Tablet 6 milliGRAM(s) Oral daily  influenza  Vaccine (HIGH DOSE) 0.7 milliLiter(s) IntraMuscular once  metoprolol succinate ER 50 milliGRAM(s) Oral daily  metoprolol succinate ER 25 milliGRAM(s) Oral once  potassium chloride    Tablet ER 40 milliEquivalent(s) Oral every 4 hours  remdesivir  IVPB   IV Intermittent   remdesivir  IVPB 100 milliGRAM(s) IV Intermittent every 24 hours    MEDICATIONS  (PRN):  acetaminophen     Tablet .. 650 milliGRAM(s) Oral every 6 hours PRN Temp greater or equal to 38C (100.4F), Mild Pain (1 - 3)  guaifenesin/dextromethorphan Oral Liquid 10 milliLiter(s) Oral every 4 hours PRN Cough      Vital Signs Last 24 Hrs  T(C): 36.7 (2022 04:22), Max: 38.3 (10 Nov 2022 21:40)  T(F): 98 (2022 04:22), Max: 101 (10 Nov 2022 21:40)  HR: 69 (2022 06:45) (69 - 134)  BP: 105/56 (2022 06:45) (86/50 - 132/92)  BP(mean): --  RR: 18 (2022 06:45) (18 - 20)  SpO2: 95% (2022 04:22) (90% - 96%)    Parameters below as of 2022 06:45  Patient On (Oxygen Delivery Method): nasal cannula  O2 Flow (L/min): 2    CAPILLARY BLOOD GLUCOSE        I&O's Summary    10 Nov 2022 07:01  -  2022 07:00  --------------------------------------------------------  IN: 420 mL / OUT: 400 mL / NET: 20 mL          PHYSICAL EXAM  GENERAL: NAD, well-developed  HEAD:  Atraumatic, normocephalic  EYES: EOMI, PERRLA, conjunctiva and sclera clear  CHEST/LUNG: Clear to auscultation bilaterally; no wheezes  HEART: +S1+S2, irregular  ABDOMEN: Soft, nontender, nondistended; bowel sounds present  EXTREMITIES:  2+ peripheral pulses; no clubbing, cyanosis, or edema  PSYCH: AAOx3      LABS:                        13.7   9.34  )-----------( 221      ( 2022 03:31 )             41.9     11-11    139  |  102  |  17  ----------------------------<  140<H>  3.1<L>   |  21<L>  |  0.60    Ca    8.6      2022 03:31  Phos  3.6     11-11  Mg     1.8     1111            Urinalysis Basic - ( 10 Nov 2022 05:02 )    Color: Yellow / Appearance: Slightly Turbid / S.025 / pH: x  Gluc: x / Ketone: Small  / Bili: Negative / Urobili: 2 mg/dL   Blood: x / Protein: 30 mg/dL / Nitrite: Positive   Leuk Esterase: Large / RBC: 14 /hpf / WBC 10 /HPF   Sq Epi: x / Non Sq Epi: 2 /hpf / Bacteria: Many          RADIOLOGY & ADDITIONAL TESTS:    Imaging Personally Reviewed:  Consultant(s) Notes Reviewed:    Care Discussed with Consultants/Other Providers:

## 2022-11-12 LAB
ALBUMIN SERPL ELPH-MCNC: 2.9 G/DL — LOW (ref 3.3–5)
ALP SERPL-CCNC: 65 U/L — SIGNIFICANT CHANGE UP (ref 40–120)
ALT FLD-CCNC: 12 U/L — SIGNIFICANT CHANGE UP (ref 10–45)
ANION GAP SERPL CALC-SCNC: 13 MMOL/L — SIGNIFICANT CHANGE UP (ref 5–17)
AST SERPL-CCNC: 23 U/L — SIGNIFICANT CHANGE UP (ref 10–40)
BILIRUB SERPL-MCNC: 0.4 MG/DL — SIGNIFICANT CHANGE UP (ref 0.2–1.2)
BUN SERPL-MCNC: 18 MG/DL — SIGNIFICANT CHANGE UP (ref 7–23)
CALCIUM SERPL-MCNC: 8.4 MG/DL — SIGNIFICANT CHANGE UP (ref 8.4–10.5)
CHLORIDE SERPL-SCNC: 104 MMOL/L — SIGNIFICANT CHANGE UP (ref 96–108)
CO2 SERPL-SCNC: 19 MMOL/L — LOW (ref 22–31)
CREAT SERPL-MCNC: 0.47 MG/DL — LOW (ref 0.5–1.3)
EGFR: 90 ML/MIN/1.73M2 — SIGNIFICANT CHANGE UP
GLUCOSE SERPL-MCNC: 79 MG/DL — SIGNIFICANT CHANGE UP (ref 70–99)
HCT VFR BLD CALC: 38.6 % — SIGNIFICANT CHANGE UP (ref 34.5–45)
HGB BLD-MCNC: 13.1 G/DL — SIGNIFICANT CHANGE UP (ref 11.5–15.5)
INR BLD: 2.04 RATIO — HIGH (ref 0.88–1.16)
MAGNESIUM SERPL-MCNC: 2 MG/DL — SIGNIFICANT CHANGE UP (ref 1.6–2.6)
MCHC RBC-ENTMCNC: 31.3 PG — SIGNIFICANT CHANGE UP (ref 27–34)
MCHC RBC-ENTMCNC: 33.9 GM/DL — SIGNIFICANT CHANGE UP (ref 32–36)
MCV RBC AUTO: 92.3 FL — SIGNIFICANT CHANGE UP (ref 80–100)
NRBC # BLD: 0 /100 WBCS — SIGNIFICANT CHANGE UP (ref 0–0)
PLATELET # BLD AUTO: 231 K/UL — SIGNIFICANT CHANGE UP (ref 150–400)
POTASSIUM SERPL-MCNC: 5 MMOL/L — SIGNIFICANT CHANGE UP (ref 3.5–5.3)
POTASSIUM SERPL-SCNC: 5 MMOL/L — SIGNIFICANT CHANGE UP (ref 3.5–5.3)
PROT SERPL-MCNC: 5.7 G/DL — LOW (ref 6–8.3)
PROTHROM AB SERPL-ACNC: 23.6 SEC — HIGH (ref 10.5–13.4)
RBC # BLD: 4.18 M/UL — SIGNIFICANT CHANGE UP (ref 3.8–5.2)
RBC # FLD: 13.7 % — SIGNIFICANT CHANGE UP (ref 10.3–14.5)
SODIUM SERPL-SCNC: 136 MMOL/L — SIGNIFICANT CHANGE UP (ref 135–145)
WBC # BLD: 9.47 K/UL — SIGNIFICANT CHANGE UP (ref 3.8–10.5)
WBC # FLD AUTO: 9.47 K/UL — SIGNIFICANT CHANGE UP (ref 3.8–10.5)

## 2022-11-12 PROCEDURE — 99233 SBSQ HOSP IP/OBS HIGH 50: CPT

## 2022-11-12 PROCEDURE — 93010 ELECTROCARDIOGRAM REPORT: CPT

## 2022-11-12 RX ORDER — NYSTATIN CREAM 100000 [USP'U]/G
1 CREAM TOPICAL
Refills: 0 | Status: DISCONTINUED | OUTPATIENT
Start: 2022-11-12 | End: 2022-11-21

## 2022-11-12 RX ORDER — DILTIAZEM HCL 120 MG
5 CAPSULE, EXT RELEASE 24 HR ORAL ONCE
Refills: 0 | Status: COMPLETED | OUTPATIENT
Start: 2022-11-12 | End: 2022-11-12

## 2022-11-12 RX ADMIN — Medication 50 MILLIGRAM(S): at 05:02

## 2022-11-12 RX ADMIN — CHLORHEXIDINE GLUCONATE 1 APPLICATION(S): 213 SOLUTION TOPICAL at 05:40

## 2022-11-12 RX ADMIN — APIXABAN 5 MILLIGRAM(S): 2.5 TABLET, FILM COATED ORAL at 05:02

## 2022-11-12 RX ADMIN — Medication 6 MILLIGRAM(S): at 05:02

## 2022-11-12 RX ADMIN — Medication 5 MILLIGRAM(S): at 05:51

## 2022-11-12 RX ADMIN — APIXABAN 5 MILLIGRAM(S): 2.5 TABLET, FILM COATED ORAL at 18:18

## 2022-11-12 RX ADMIN — CEFTRIAXONE 100 MILLIGRAM(S): 500 INJECTION, POWDER, FOR SOLUTION INTRAMUSCULAR; INTRAVENOUS at 10:30

## 2022-11-12 RX ADMIN — REMDESIVIR 500 MILLIGRAM(S): 5 INJECTION INTRAVENOUS at 09:48

## 2022-11-12 RX ADMIN — CITALOPRAM 20 MILLIGRAM(S): 10 TABLET, FILM COATED ORAL at 22:15

## 2022-11-12 NOTE — PROVIDER CONTACT NOTE (OTHER) - ASSESSMENT
Pt asymptomatic and VSS. No complaints of chest pain, respiratory distress or light headedness. ACP came to see patient for this episode.

## 2022-11-12 NOTE — PROGRESS NOTE ADULT - SUBJECTIVE AND OBJECTIVE BOX
PROGRESS NOTE:     Patient is a 89y old  Female who presents with a chief complaint of COVID (11 Nov 2022 11:10)      SUBJECTIVE / OVERNIGHT EVENTS:  Around 5am, patient had A flutter, w/ HR sustained 130s, brief jump to 160s, s/p diltiazem IV 5mg.   Later this morning, on telemetry, normal sinus rhythm 70s. Assessed patient, no Chest pain/SOB/abdominal pain     ADDITIONAL REVIEW OF SYSTEMS:    MEDICATIONS  (STANDING):  apixaban 5 milliGRAM(s) Oral every 12 hours  cefTRIAXone   IVPB      cefTRIAXone   IVPB 1000 milliGRAM(s) IV Intermittent every 24 hours  chlorhexidine 2% Cloths 1 Application(s) Topical daily  citalopram 20 milliGRAM(s) Oral daily  dexAMETHasone     Tablet 6 milliGRAM(s) Oral daily  influenza  Vaccine (HIGH DOSE) 0.7 milliLiter(s) IntraMuscular once  metoprolol succinate ER 50 milliGRAM(s) Oral daily  remdesivir  IVPB 100 milliGRAM(s) IV Intermittent every 24 hours    MEDICATIONS  (PRN):  acetaminophen     Tablet .. 650 milliGRAM(s) Oral every 6 hours PRN Temp greater or equal to 38C (100.4F), Mild Pain (1 - 3)  guaifenesin/dextromethorphan Oral Liquid 10 milliLiter(s) Oral every 4 hours PRN Cough      CAPILLARY BLOOD GLUCOSE        I&O's Summary    11 Nov 2022 07:01  -  12 Nov 2022 07:00  --------------------------------------------------------  IN: 480 mL / OUT: 400 mL / NET: 80 mL        PHYSICAL EXAM:  Vital Signs Last 24 Hrs  T(C): 37.1 (12 Nov 2022 12:35), Max: 37.1 (12 Nov 2022 02:29)  T(F): 98.7 (12 Nov 2022 12:35), Max: 98.8 (12 Nov 2022 02:29)  HR: 82 (12 Nov 2022 12:35) (82 - 98)  BP: 99/58 (12 Nov 2022 12:35) (99/58 - 126/81)  BP(mean): --  RR: 17 (12 Nov 2022 12:35) (17 - 18)  SpO2: 93% (12 Nov 2022 12:35) (93% - 97%)    Parameters below as of 12 Nov 2022 12:35  Patient On (Oxygen Delivery Method): nasal cannula        CONSTITUTIONAL: NAD, well-developed  RESPIRATORY: Normal respiratory effort; lungs are clear to auscultation bilaterally  CARDIOVASCULAR: Regular rate and rhythm, normal S1 and S2, no murmur/rub/gallop; No lower extremity edema  ABDOMEN: Nontender to palpation, normoactive bowel sounds, no rebound/guarding  MUSCLOSKELETAL: no clubbing or cyanosis of digits; no joint swelling or tenderness to palpation  PSYCH: A+O to person, place, and time; affect appropriate    LABS:                        13.1   9.47  )-----------( 231      ( 12 Nov 2022 07:11 )             38.6     11-12    136  |  104  |  18  ----------------------------<  79  5.0   |  19<L>  |  0.47<L>    Ca    8.4      12 Nov 2022 07:39  Phos  3.6     11-11  Mg     2.0     11-12    TPro  5.7<L>  /  Alb  2.9<L>  /  TBili  0.4  /  DBili  x   /  AST  23  /  ALT  12  /  AlkPhos  65  11-12    PT/INR - ( 12 Nov 2022 07:11 )   PT: 23.6 sec;   INR: 2.04 ratio                   Culture - Blood (collected 11 Nov 2022 00:10)  Source: .Blood Blood-Peripheral  Preliminary Report (12 Nov 2022 04:01):    No growth to date.    Culture - Blood (collected 11 Nov 2022 00:10)  Source: .Blood Blood-Peripheral  Preliminary Report (12 Nov 2022 04:01):    No growth to date.    Culture - Urine (collected 10 Nov 2022 05:03)  Source: Clean Catch Clean Catch (Midstream)  Preliminary Report (11 Nov 2022 18:09):    >100,000 CFU/ml Escherichia coli        RADIOLOGY & ADDITIONAL TESTS:  Results Reviewed:   Imaging Personally Reviewed:  Electrocardiogram Personally Reviewed:    COORDINATION OF CARE:  Care Discussed with Consultants/Other Providers [Y/N]:  Prior or Outpatient Records Reviewed [Y/N]:

## 2022-11-12 NOTE — PROVIDER CONTACT NOTE (OTHER) - BACKGROUND
89yo F w/ PMH of TIA, tachycarida, cardiac aneurysm, multiple falls and depression pw c/f COVID19.
88yo F w/ PMH of TIA, tachycarida, cardiac aneurysm, multiple falls and depression pw c/f COVID19.

## 2022-11-12 NOTE — PROGRESS NOTE ADULT - PROBLEM SELECTOR PLAN 1
Now requiring supplemental O2  Extend Remdesivir to 5 days- today day 4  Start Decadron 6 mg PO daily x 10 days  Isolation precautions

## 2022-11-12 NOTE — PROGRESS NOTE ADULT - PROBLEM SELECTOR PLAN 3
Hx palpitations and ST on beta-blocker at home  Now in AF- rate not controlled in setting of fever and new hypoxia   Increase Toprol to 50 mg PO daily  TTE reviewed  Continue Eliquis 5 mg PO BID  11/12 AM A flutter 130s, resolved with IV diltiazem, now in NSR --- consider EP consult if recurrent episode, may need ablation, as A flutter difficult to control via medications

## 2022-11-12 NOTE — CHART NOTE - NSCHARTNOTEFT_GEN_A_CORE
Medicine PA Note     JEN REICH  MRN-[id number]  Allergies    No Known Allergies    Intolerances         Notified by RN, Pt tachycardic to 130's on telemetry. Pt seen and evaluated at bedside. Pt with no acute complaints, is alert and in no apparent distress. Pt denies headache, chest pain, sob, n/v/d, weakness, dizziness.    Vital Signs Last 24 Hrs  T(C): 37 (11-12-22 @ 04:52), Max: 37.1 (11-12-22 @ 02:29)  T(F): 98.6 (11-12-22 @ 04:52), Max: 98.8 (11-12-22 @ 02:29)  HR: 95 (11-12-22 @ 04:52) (69 - 100)  BP: 126/81 (11-12-22 @ 04:52) (103/65 - 126/81)  BP(mean): --  RR: 18 (11-12-22 @ 04:52) (18 - 18)  SpO2: 97% (11-12-22 @ 04:52) (93% - 97%)                        13.7   9.34  )-----------( 221      ( 11 Nov 2022 03:31 )             41.9     11-11    x   |  x   |  x   ----------------------------<  x   x    |  x   |  0.48<L>    Ca    8.6      11 Nov 2022 03:31  Phos  3.6     11-11  Mg     1.8     11-11    TPro  5.3<L>  /  Alb  3.1<L>  /  TBili  0.3  /  DBili  0.1  /  AST  23  /  ALT  14  /  AlkPhos  70  11-11    PT/INR - ( 11 Nov 2022 13:26 )   PT: 19.9 sec;   INR: 1.71 ratio               PHYSICAL EXAM:  GENERAL: NAD, well-developed  CHEST/LUNG: Clear to auscultation bilaterally; No wheezes, rhonchi or rales appreciated  HEART: Tachycardic; No murmurs, rubs, or gallops  ABDOMEN: Soft, Nontender, Nondistended; Bowel sounds present. No rebound, or guarding noted.  EXTREMITIES:  2+ Peripheral Pulses, No clubbing, cyanosis, or edema        Assessment/Plan: HPI:  Pt is an 88yo F w/ PMH of TIA, tachycarida, cardiac aneurysm, multiple falls and depression pw c/f COVID19. States family member in the home w/ COVID in the home last week. on day prior to presentation pt developed cough w/ decreased PO intake and significant fatigue, unable to ambulate w/ walker. She otherwise denies any CP, SOB, palpitations, abd pain, N/V, constipation, or diarrhea. Pt now presenting with conversion to A.flutter from NSR.    A.flutter  >VS hemodynamically stable aside from HR-130's sustaining with brief jumps to 160's  >EKG done - HR:130's, A.flutter with no acute ST/T changes from previous  >C/w Toprol 50qd - last dose given at 5AM  >Cardizem 5 IVP ordered  >BMP/Mg/Phos ordered STAT  >C/w tele monitoring  > Will endorse to AM team, attending to follow    Marisabel Mao PA-C,   Department of Medicine Medicine PA Note     JEN REICH  MRN-[id number]  Allergies    No Known Allergies    Intolerances         Notified by RN, Pt tachycardic to 130's on telemetry. Pt seen and evaluated at bedside. Pt with no acute complaints, is alert and in no apparent distress. Pt denies headache, chest pain, sob, n/v/d, weakness, dizziness.    Vital Signs Last 24 Hrs  T(C): 37 (11-12-22 @ 04:52), Max: 37.1 (11-12-22 @ 02:29)  T(F): 98.6 (11-12-22 @ 04:52), Max: 98.8 (11-12-22 @ 02:29)  HR: 95 (11-12-22 @ 04:52) (69 - 100)  BP: 126/81 (11-12-22 @ 04:52) (103/65 - 126/81)  BP(mean): --  RR: 18 (11-12-22 @ 04:52) (18 - 18)  SpO2: 97% (11-12-22 @ 04:52) (93% - 97%)                        13.7   9.34  )-----------( 221      ( 11 Nov 2022 03:31 )             41.9     11-11    x   |  x   |  x   ----------------------------<  x   x    |  x   |  0.48<L>    Ca    8.6      11 Nov 2022 03:31  Phos  3.6     11-11  Mg     1.8     11-11    TPro  5.3<L>  /  Alb  3.1<L>  /  TBili  0.3  /  DBili  0.1  /  AST  23  /  ALT  14  /  AlkPhos  70  11-11    PT/INR - ( 11 Nov 2022 13:26 )   PT: 19.9 sec;   INR: 1.71 ratio               PHYSICAL EXAM:  GENERAL: NAD, well-developed  CHEST/LUNG: Clear to auscultation bilaterally; No wheezes, rhonchi or rales appreciated  HEART: Tachycardic; No murmurs, rubs, or gallops  ABDOMEN: Soft, Nontender, Nondistended; Bowel sounds present. No rebound, or guarding noted.  EXTREMITIES:  2+ Peripheral Pulses, No clubbing, cyanosis, or edema        Assessment/Plan: HPI:  Pt is an 89yo F w/ PMH of TIA, tachycarida, cardiac aneurysm, multiple falls and depression pw c/f COVID19. States family member in the home w/ COVID in the home last week. on day prior to presentation pt developed cough w/ decreased PO intake and significant fatigue, unable to ambulate w/ walker. She otherwise denies any CP, SOB, palpitations, abd pain, N/V, constipation, or diarrhea. Pt now presenting with conversion to A.flutter from NSR.    A.flutter  >VS hemodynamically stable aside from HR-130's sustaining with brief jumps to 160's  >EKG done - HR:130's, A.flutter with no acute ST/T changes from previous  >C/w Toprol 50qd - last dose given at 5AM  >Cardizem 5 IVP ordered  >BMP/Mg/Phos ordered for the AM - f/u   >Keep K>4, Mg>2  >C/w tele monitoring  > Will endorse to AM team, attending to follow    Marisabel Mao PA-C,   Department of Medicine

## 2022-11-12 NOTE — PROVIDER CONTACT NOTE (OTHER) - ASSESSMENT
Pt asymptomatic and VSS. /77 and HR 93. Pt back on tele in SR 90s. Pt asymptomatic and VSS. Pt denies chest pain and respiratory distress. /77 and HR 93. Pt back on tele in SR 90s.

## 2022-11-13 DIAGNOSIS — R79.1 ABNORMAL COAGULATION PROFILE: ICD-10-CM

## 2022-11-13 DIAGNOSIS — I48.92 UNSPECIFIED ATRIAL FLUTTER: ICD-10-CM

## 2022-11-13 LAB
-  AMIKACIN: SIGNIFICANT CHANGE UP
-  AMOXICILLIN/CLAVULANIC ACID: SIGNIFICANT CHANGE UP
-  AMPICILLIN/SULBACTAM: SIGNIFICANT CHANGE UP
-  AMPICILLIN: SIGNIFICANT CHANGE UP
-  AZTREONAM: SIGNIFICANT CHANGE UP
-  CEFAZOLIN: SIGNIFICANT CHANGE UP
-  CEFEPIME: SIGNIFICANT CHANGE UP
-  CEFOXITIN: SIGNIFICANT CHANGE UP
-  CEFTRIAXONE: SIGNIFICANT CHANGE UP
-  CIPROFLOXACIN: SIGNIFICANT CHANGE UP
-  ERTAPENEM: SIGNIFICANT CHANGE UP
-  GENTAMICIN: SIGNIFICANT CHANGE UP
-  IMIPENEM: SIGNIFICANT CHANGE UP
-  LEVOFLOXACIN: SIGNIFICANT CHANGE UP
-  MEROPENEM: SIGNIFICANT CHANGE UP
-  NITROFURANTOIN: SIGNIFICANT CHANGE UP
-  PIPERACILLIN/TAZOBACTAM: SIGNIFICANT CHANGE UP
-  TOBRAMYCIN: SIGNIFICANT CHANGE UP
-  TRIMETHOPRIM/SULFAMETHOXAZOLE: SIGNIFICANT CHANGE UP
ALBUMIN SERPL ELPH-MCNC: 2.9 G/DL — LOW (ref 3.3–5)
ALBUMIN SERPL ELPH-MCNC: 2.9 G/DL — LOW (ref 3.3–5)
ALP SERPL-CCNC: 76 U/L — SIGNIFICANT CHANGE UP (ref 40–120)
ALP SERPL-CCNC: 78 U/L — SIGNIFICANT CHANGE UP (ref 40–120)
ALT FLD-CCNC: 13 U/L — SIGNIFICANT CHANGE UP (ref 10–45)
ALT FLD-CCNC: 13 U/L — SIGNIFICANT CHANGE UP (ref 10–45)
ANION GAP SERPL CALC-SCNC: 12 MMOL/L — SIGNIFICANT CHANGE UP (ref 5–17)
APTT BLD: 35.6 SEC — HIGH (ref 27.5–35.5)
AST SERPL-CCNC: 18 U/L — SIGNIFICANT CHANGE UP (ref 10–40)
AST SERPL-CCNC: 19 U/L — SIGNIFICANT CHANGE UP (ref 10–40)
BILIRUB DIRECT SERPL-MCNC: 0.1 MG/DL — SIGNIFICANT CHANGE UP (ref 0–0.3)
BILIRUB INDIRECT FLD-MCNC: 0.3 MG/DL — SIGNIFICANT CHANGE UP (ref 0.2–1)
BILIRUB SERPL-MCNC: 0.3 MG/DL — SIGNIFICANT CHANGE UP (ref 0.2–1.2)
BILIRUB SERPL-MCNC: 0.4 MG/DL — SIGNIFICANT CHANGE UP (ref 0.2–1.2)
BUN SERPL-MCNC: 18 MG/DL — SIGNIFICANT CHANGE UP (ref 7–23)
CALCIUM SERPL-MCNC: 8.8 MG/DL — SIGNIFICANT CHANGE UP (ref 8.4–10.5)
CHLORIDE SERPL-SCNC: 103 MMOL/L — SIGNIFICANT CHANGE UP (ref 96–108)
CO2 SERPL-SCNC: 21 MMOL/L — LOW (ref 22–31)
CREAT SERPL-MCNC: 0.45 MG/DL — LOW (ref 0.5–1.3)
CREAT SERPL-MCNC: 0.46 MG/DL — LOW (ref 0.5–1.3)
CULTURE RESULTS: SIGNIFICANT CHANGE UP
EGFR: 90 ML/MIN/1.73M2 — SIGNIFICANT CHANGE UP
EGFR: 91 ML/MIN/1.73M2 — SIGNIFICANT CHANGE UP
FIBRINOGEN PPP-MCNC: 383 MG/DL — SIGNIFICANT CHANGE UP (ref 200–445)
FSP PPP-MCNC: < 5 UG/ML — SIGNIFICANT CHANGE UP
GLUCOSE SERPL-MCNC: 119 MG/DL — HIGH (ref 70–99)
HCT VFR BLD CALC: 37.8 % — SIGNIFICANT CHANGE UP (ref 34.5–45)
HGB BLD-MCNC: 12.5 G/DL — SIGNIFICANT CHANGE UP (ref 11.5–15.5)
INR BLD: 2.24 RATIO — HIGH (ref 0.88–1.16)
INR BLD: 2.35 RATIO — HIGH (ref 0.88–1.16)
MAGNESIUM SERPL-MCNC: 2 MG/DL — SIGNIFICANT CHANGE UP (ref 1.6–2.6)
MCHC RBC-ENTMCNC: 30.7 PG — SIGNIFICANT CHANGE UP (ref 27–34)
MCHC RBC-ENTMCNC: 33.1 GM/DL — SIGNIFICANT CHANGE UP (ref 32–36)
MCV RBC AUTO: 92.9 FL — SIGNIFICANT CHANGE UP (ref 80–100)
METHOD TYPE: SIGNIFICANT CHANGE UP
NRBC # BLD: 0 /100 WBCS — SIGNIFICANT CHANGE UP (ref 0–0)
ORGANISM # SPEC MICROSCOPIC CNT: SIGNIFICANT CHANGE UP
ORGANISM # SPEC MICROSCOPIC CNT: SIGNIFICANT CHANGE UP
PHOSPHATE SERPL-MCNC: 3 MG/DL — SIGNIFICANT CHANGE UP (ref 2.5–4.5)
PLATELET # BLD AUTO: 243 K/UL — SIGNIFICANT CHANGE UP (ref 150–400)
POTASSIUM SERPL-MCNC: 4.2 MMOL/L — SIGNIFICANT CHANGE UP (ref 3.5–5.3)
POTASSIUM SERPL-SCNC: 4.2 MMOL/L — SIGNIFICANT CHANGE UP (ref 3.5–5.3)
PROT SERPL-MCNC: 6 G/DL — SIGNIFICANT CHANGE UP (ref 6–8.3)
PROT SERPL-MCNC: 6.1 G/DL — SIGNIFICANT CHANGE UP (ref 6–8.3)
PROTHROM AB SERPL-ACNC: 26 SEC — HIGH (ref 10.5–13.4)
PROTHROM AB SERPL-ACNC: 27.5 SEC — HIGH (ref 10.5–13.4)
RBC # BLD: 4.07 M/UL — SIGNIFICANT CHANGE UP (ref 3.8–5.2)
RBC # FLD: 13.4 % — SIGNIFICANT CHANGE UP (ref 10.3–14.5)
SODIUM SERPL-SCNC: 136 MMOL/L — SIGNIFICANT CHANGE UP (ref 135–145)
SPECIMEN SOURCE: SIGNIFICANT CHANGE UP
WBC # BLD: 7.31 K/UL — SIGNIFICANT CHANGE UP (ref 3.8–10.5)
WBC # FLD AUTO: 7.31 K/UL — SIGNIFICANT CHANGE UP (ref 3.8–10.5)

## 2022-11-13 PROCEDURE — 99233 SBSQ HOSP IP/OBS HIGH 50: CPT

## 2022-11-13 RX ORDER — POTASSIUM CHLORIDE 20 MEQ
40 PACKET (EA) ORAL ONCE
Refills: 0 | Status: DISCONTINUED | OUTPATIENT
Start: 2022-11-13 | End: 2022-11-13

## 2022-11-13 RX ORDER — PHYTONADIONE (VIT K1) 5 MG
10 TABLET ORAL ONCE
Refills: 0 | Status: COMPLETED | OUTPATIENT
Start: 2022-11-13 | End: 2022-11-13

## 2022-11-13 RX ORDER — POTASSIUM CHLORIDE 20 MEQ
10 PACKET (EA) ORAL
Refills: 0 | Status: DISCONTINUED | OUTPATIENT
Start: 2022-11-13 | End: 2022-11-13

## 2022-11-13 RX ADMIN — NYSTATIN CREAM 1 APPLICATION(S): 100000 CREAM TOPICAL at 05:42

## 2022-11-13 RX ADMIN — APIXABAN 5 MILLIGRAM(S): 2.5 TABLET, FILM COATED ORAL at 17:36

## 2022-11-13 RX ADMIN — APIXABAN 5 MILLIGRAM(S): 2.5 TABLET, FILM COATED ORAL at 05:41

## 2022-11-13 RX ADMIN — Medication 10 MILLIGRAM(S): at 11:50

## 2022-11-13 RX ADMIN — Medication 6 MILLIGRAM(S): at 05:41

## 2022-11-13 RX ADMIN — REMDESIVIR 500 MILLIGRAM(S): 5 INJECTION INTRAVENOUS at 09:32

## 2022-11-13 RX ADMIN — CHLORHEXIDINE GLUCONATE 1 APPLICATION(S): 213 SOLUTION TOPICAL at 05:42

## 2022-11-13 RX ADMIN — NYSTATIN CREAM 1 APPLICATION(S): 100000 CREAM TOPICAL at 18:09

## 2022-11-13 RX ADMIN — Medication 50 MILLIGRAM(S): at 05:40

## 2022-11-13 RX ADMIN — Medication 10 MILLILITER(S): at 17:36

## 2022-11-13 RX ADMIN — CITALOPRAM 20 MILLIGRAM(S): 10 TABLET, FILM COATED ORAL at 21:59

## 2022-11-13 NOTE — PROGRESS NOTE ADULT - SUBJECTIVE AND OBJECTIVE BOX
PROGRESS NOTE:     Patient is a 89y old  Female who presents with a chief complaint of COVID (12 Nov 2022 14:33)      SUBJECTIVE / OVERNIGHT EVENTS:  No acute events overnight. Patient seen and evaluated at bedside. Patient now weaned off O2, satting well on RA. Still has frequent dry cough. Otherwise denies chest pain/SOB/abdominal pain, tolerated breakfast well.     ADDITIONAL REVIEW OF SYSTEMS:    MEDICATIONS  (STANDING):  apixaban 5 milliGRAM(s) Oral every 12 hours  chlorhexidine 2% Cloths 1 Application(s) Topical daily  citalopram 20 milliGRAM(s) Oral daily  dexAMETHasone     Tablet 6 milliGRAM(s) Oral daily  influenza  Vaccine (HIGH DOSE) 0.7 milliLiter(s) IntraMuscular once  metoprolol succinate ER 50 milliGRAM(s) Oral daily  nystatin Powder 1 Application(s) Topical two times a day    MEDICATIONS  (PRN):  acetaminophen     Tablet .. 650 milliGRAM(s) Oral every 6 hours PRN Temp greater or equal to 38C (100.4F), Mild Pain (1 - 3)  guaifenesin/dextromethorphan Oral Liquid 10 milliLiter(s) Oral every 4 hours PRN Cough      CAPILLARY BLOOD GLUCOSE        I&O's Summary    12 Nov 2022 07:01  -  13 Nov 2022 07:00  --------------------------------------------------------  IN: 660 mL / OUT: 600 mL / NET: 60 mL    13 Nov 2022 07:01  -  13 Nov 2022 15:53  --------------------------------------------------------  IN: 340 mL / OUT: 0 mL / NET: 340 mL        PHYSICAL EXAM:  Vital Signs Last 24 Hrs  T(C): 36.6 (13 Nov 2022 13:37), Max: 36.6 (13 Nov 2022 13:37)  T(F): 97.8 (13 Nov 2022 13:37), Max: 97.8 (13 Nov 2022 13:37)  HR: 74 (13 Nov 2022 13:37) (74 - 80)  BP: 100/60 (13 Nov 2022 13:37) (100/60 - 132/83)  BP(mean): --  RR: 18 (13 Nov 2022 13:37) (18 - 18)  SpO2: 95% (13 Nov 2022 13:37) (94% - 95%)    Parameters below as of 13 Nov 2022 13:37  Patient On (Oxygen Delivery Method): room air        CONSTITUTIONAL: NAD, frail appearance   RESPIRATORY: Normal respiratory effort; lungs are clear to auscultation bilaterally; + dry cough   CARDIOVASCULAR: Regular rate and rhythm, normal S1 and S2, no murmur/rub/gallop; No lower extremity edema.  ABDOMEN: Nontender to palpation, normoactive bowel sounds, no rebound/guarding.   MUSCLOSKELETAL: no clubbing or cyanosis of digits; no joint swelling or tenderness to palpation  PSYCH: A+O to person, place, and time; affect appropriate    LABS:                        12.5   7.31  )-----------( 243      ( 13 Nov 2022 07:15 )             37.8     11-13    136  |  103  |  18  ----------------------------<  119<H>  4.2   |  21<L>  |  0.45<L>    Ca    8.8      13 Nov 2022 07:14  Phos  3.0     11-13  Mg     2.0     11-13    TPro  6.0  /  Alb  2.9<L>  /  TBili  0.3  /  DBili  0.1  /  AST  19  /  ALT  13  /  AlkPhos  76  11-13    PT/INR - ( 13 Nov 2022 07:20 )   PT: 26.0 sec;   INR: 2.24 ratio         PTT - ( 13 Nov 2022 07:20 )  PTT:35.6 sec          Culture - Blood (collected 11 Nov 2022 00:10)  Source: .Blood Blood-Peripheral  Preliminary Report (12 Nov 2022 04:01):    No growth to date.    Culture - Blood (collected 11 Nov 2022 00:10)  Source: .Blood Blood-Peripheral  Preliminary Report (12 Nov 2022 04:01):    No growth to date.        RADIOLOGY & ADDITIONAL TESTS:  Personally reviewed telemetry: sinus 60s-80s with occasional PACs overnight.   Results Reviewed:   Imaging Personally Reviewed:  Electrocardiogram Personally Reviewed:    COORDINATION OF CARE:  Care Discussed with Consultants/Other Providers [Y/N]:  Prior or Outpatient Records Reviewed [Y/N]:

## 2022-11-13 NOTE — PROGRESS NOTE ADULT - PROBLEM SELECTOR PLAN 3
Patient reports history of heart palpitations at home  Found to have new onset Atrial fibrillation/Atrial flutter during this admission   - currently converted to sinus rhythm HR 60s-80s with occasional PACs  - c/w metoprolol 50mg PO daily  - c/w eliquis 5mg q12h

## 2022-11-13 NOTE — PROGRESS NOTE ADULT - ASSESSMENT
89F PMH of TIA, tachycardia, cardiac aneurysm, and depression admitted with COVID-19 and UTI- now febrile with supp O2 requirements, new AF with RVR    Hx palpitations and ST on beta-blocker at home  Increase Toprol to 50 mg PO daily  TTE reviewed  Continue Eliquis 5 mg PO BID  Previously  11/13: now converted to sinus rhythm since 11/12, rate 60s-70s

## 2022-11-13 NOTE — PROGRESS NOTE ADULT - PROBLEM SELECTOR PLAN 2
+UA, s/p 3 days of ceftriaxone (11/10-- 11/12)   UCx E coli, sensitive to ceftriaxone   Blood cultures x2 NGTD

## 2022-11-13 NOTE — PROGRESS NOTE ADULT - PROBLEM SELECTOR PLAN 1
Weaned off O2, now satting well on RA   Extend Remdesivir to 5 days- today day 5.   Start Decadron 6 mg PO daily x 10 days  Isolation precautions

## 2022-11-13 NOTE — PROGRESS NOTE ADULT - PROBLEM SELECTOR PLAN 5
Continue citalopram 20 mg PO daily In setting of COVID-19 and UTI with fever  PT eval recommending RADHA  Fall precautions

## 2022-11-13 NOTE — PROGRESS NOTE ADULT - PROBLEM SELECTOR PLAN 4
In setting of COVID-19 and UTI with fever  PT eval recommending RADHA  Fall precautions - uptrending INR 1.22--> 1.7 --. 2,04 --> 2.24  - low suspicion for DIC, as aPTT 35.6 essentially WNL, t bili 0.4 and d bili 0.1 inconsistent with hemolysis, Platelet 243 WNL , will f/u D-dimer, fibrinogen, fibrin degradation products  - likely 2/2 poor PO intake and on eliquis   - H & H stable, no signs/symptoms of bleeding   - oral vitamin K 10mg x1 on 11/13  - trend INR

## 2022-11-14 LAB
ALBUMIN SERPL ELPH-MCNC: 2.8 G/DL — LOW (ref 3.3–5)
ALBUMIN SERPL ELPH-MCNC: 2.9 G/DL — LOW (ref 3.3–5)
ALP SERPL-CCNC: 74 U/L — SIGNIFICANT CHANGE UP (ref 40–120)
ALP SERPL-CCNC: 75 U/L — SIGNIFICANT CHANGE UP (ref 40–120)
ALT FLD-CCNC: 12 U/L — SIGNIFICANT CHANGE UP (ref 10–45)
ALT FLD-CCNC: 19 U/L — SIGNIFICANT CHANGE UP (ref 10–45)
ANION GAP SERPL CALC-SCNC: 12 MMOL/L — SIGNIFICANT CHANGE UP (ref 5–17)
AST SERPL-CCNC: 16 U/L — SIGNIFICANT CHANGE UP (ref 10–40)
AST SERPL-CCNC: 20 U/L — SIGNIFICANT CHANGE UP (ref 10–40)
BILIRUB DIRECT SERPL-MCNC: 0.1 MG/DL — SIGNIFICANT CHANGE UP (ref 0–0.3)
BILIRUB INDIRECT FLD-MCNC: 0.2 MG/DL — SIGNIFICANT CHANGE UP (ref 0.2–1)
BILIRUB SERPL-MCNC: 0.3 MG/DL — SIGNIFICANT CHANGE UP (ref 0.2–1.2)
BILIRUB SERPL-MCNC: 0.3 MG/DL — SIGNIFICANT CHANGE UP (ref 0.2–1.2)
BUN SERPL-MCNC: 21 MG/DL — SIGNIFICANT CHANGE UP (ref 7–23)
CALCIUM SERPL-MCNC: 8.8 MG/DL — SIGNIFICANT CHANGE UP (ref 8.4–10.5)
CHLORIDE SERPL-SCNC: 104 MMOL/L — SIGNIFICANT CHANGE UP (ref 96–108)
CO2 SERPL-SCNC: 22 MMOL/L — SIGNIFICANT CHANGE UP (ref 22–31)
CREAT SERPL-MCNC: 0.47 MG/DL — LOW (ref 0.5–1.3)
CREAT SERPL-MCNC: 0.48 MG/DL — LOW (ref 0.5–1.3)
CRP SERPL-MCNC: 48 MG/L — HIGH (ref 0–4)
D DIMER BLD IA.RAPID-MCNC: 214 NG/ML DDU — SIGNIFICANT CHANGE UP
EGFR: 89 ML/MIN/1.73M2 — SIGNIFICANT CHANGE UP
EGFR: 90 ML/MIN/1.73M2 — SIGNIFICANT CHANGE UP
FERRITIN SERPL-MCNC: 402 NG/ML — HIGH (ref 15–150)
GLUCOSE SERPL-MCNC: 137 MG/DL — HIGH (ref 70–99)
HCT VFR BLD CALC: 39.3 % — SIGNIFICANT CHANGE UP (ref 34.5–45)
HGB BLD-MCNC: 12.8 G/DL — SIGNIFICANT CHANGE UP (ref 11.5–15.5)
INR BLD: 1.89 RATIO — HIGH (ref 0.88–1.16)
MCHC RBC-ENTMCNC: 30.3 PG — SIGNIFICANT CHANGE UP (ref 27–34)
MCHC RBC-ENTMCNC: 32.6 GM/DL — SIGNIFICANT CHANGE UP (ref 32–36)
MCV RBC AUTO: 93.1 FL — SIGNIFICANT CHANGE UP (ref 80–100)
NRBC # BLD: 0 /100 WBCS — SIGNIFICANT CHANGE UP (ref 0–0)
PLATELET # BLD AUTO: 277 K/UL — SIGNIFICANT CHANGE UP (ref 150–400)
POTASSIUM SERPL-MCNC: 4.1 MMOL/L — SIGNIFICANT CHANGE UP (ref 3.5–5.3)
POTASSIUM SERPL-SCNC: 4.1 MMOL/L — SIGNIFICANT CHANGE UP (ref 3.5–5.3)
PROT SERPL-MCNC: 5.7 G/DL — LOW (ref 6–8.3)
PROT SERPL-MCNC: 5.9 G/DL — LOW (ref 6–8.3)
PROTHROM AB SERPL-ACNC: 21.9 SEC — HIGH (ref 10.5–13.4)
RBC # BLD: 4.22 M/UL — SIGNIFICANT CHANGE UP (ref 3.8–5.2)
RBC # FLD: 13.3 % — SIGNIFICANT CHANGE UP (ref 10.3–14.5)
SODIUM SERPL-SCNC: 138 MMOL/L — SIGNIFICANT CHANGE UP (ref 135–145)
WBC # BLD: 8.01 K/UL — SIGNIFICANT CHANGE UP (ref 3.8–10.5)
WBC # FLD AUTO: 8.01 K/UL — SIGNIFICANT CHANGE UP (ref 3.8–10.5)

## 2022-11-14 PROCEDURE — 99232 SBSQ HOSP IP/OBS MODERATE 35: CPT

## 2022-11-14 RX ADMIN — Medication 6 MILLIGRAM(S): at 05:51

## 2022-11-14 RX ADMIN — Medication 50 MILLIGRAM(S): at 05:52

## 2022-11-14 RX ADMIN — APIXABAN 5 MILLIGRAM(S): 2.5 TABLET, FILM COATED ORAL at 05:52

## 2022-11-14 RX ADMIN — CITALOPRAM 20 MILLIGRAM(S): 10 TABLET, FILM COATED ORAL at 21:34

## 2022-11-14 RX ADMIN — NYSTATIN CREAM 1 APPLICATION(S): 100000 CREAM TOPICAL at 06:07

## 2022-11-14 RX ADMIN — APIXABAN 5 MILLIGRAM(S): 2.5 TABLET, FILM COATED ORAL at 17:35

## 2022-11-14 RX ADMIN — CHLORHEXIDINE GLUCONATE 1 APPLICATION(S): 213 SOLUTION TOPICAL at 06:07

## 2022-11-14 NOTE — PROGRESS NOTE ADULT - NSPROGADDITIONALINFOA_GEN_ALL_CORE
D/w pt's daughter Marjan Pierre [phone] via phonecall    [facility] Division of Hospital Medicine  Judy Berg MD  Pager (M-F, 8A-5P): [phone]  Other Times:  [phone]

## 2022-11-14 NOTE — PROGRESS NOTE ADULT - SUBJECTIVE AND OBJECTIVE BOX
PROGRESS NOTE:     Patient is a 89y old  Female who presents with a chief complaint of COVID     SUBJECTIVE / OVERNIGHT EVENTS:  No acute events overnight.     ADDITIONAL REVIEW OF SYSTEMS: negative    MEDICATIONS  (STANDING):  apixaban 5 milliGRAM(s) Oral every 12 hours  chlorhexidine 2% Cloths 1 Application(s) Topical daily  citalopram 20 milliGRAM(s) Oral daily  dexAMETHasone     Tablet 6 milliGRAM(s) Oral daily  influenza  Vaccine (HIGH DOSE) 0.7 milliLiter(s) IntraMuscular once  metoprolol succinate ER 50 milliGRAM(s) Oral daily  nystatin Powder 1 Application(s) Topical two times a day    MEDICATIONS  (PRN):  acetaminophen     Tablet .. 650 milliGRAM(s) Oral every 6 hours PRN Temp greater or equal to 38C (100.4F), Mild Pain (1 - 3)  guaifenesin/dextromethorphan Oral Liquid 10 milliLiter(s) Oral every 4 hours PRN Cough      PHYSICAL EXAM:  T(C): 36.5 (11-14-22 @ 12:17), Max: 36.7 (11-13-22 @ 16:40)  T(F): 97.7 (11-14-22 @ 12:17), Max: 98 (11-13-22 @ 16:40)  HR: 62 (11-14-22 @ 12:17) (62 - 75)  BP: 104/71 (11-14-22 @ 12:17) (104/59 - 124/81)  RR: 18 (11-14-22 @ 12:17) (18 - 18)  SpO2: 95% (11-14-22 @ 12:17) (95% - 95%)  Wt(kg): --    GENERAL: Frail appearing elderly woman, in NAD  EYES: EOMI, conjunctiva and sclera clear  ENT: moist mucosa, no pharyngeal erythema  NECK: supple, no JVD  LUNG: Clear to auscultation bilaterally; No rales, rhonchi, wheezing, or rubs.   CVS: Regular rate and rhythm; No murmurs, rubs, or gallops  ABDOMEN: Soft, non tender, nondistended. +Bowel sounds.  EXTREMITIES:  no edema, no cyanosis  NEURO:  Alert & Oriented X3,  No focal deficits  PSYCH: Normal affect, normal mood  MUSCULOSKELETAL: FROM, no joint swelling  Skin: warm and dry, normal color    LABS: reviewed                               12.8   8.01  )-----------( 277      ( 14 Nov 2022 07:01 )             39.3       11-14    138  |  104  |  21  ----------------------------<  137<H>  4.1   |  22  |  0.47<L>    Ca    8.8      14 Nov 2022 07:01  Phos  3.0     11-13  Mg     2.0     11-13    TPro  5.9<L>  /  Alb  2.8<L>  /  TBili  0.3  /  DBili  x   /  AST  16  /  ALT  19  /  AlkPhos  74  11-14                  PT/INR - ( 14 Nov 2022 07:09 )   PT: 21.9 sec;   INR: 1.89 ratio         PTT - ( 13 Nov 2022 07:20 )  PTT:35.6 sec          CAPILLARY BLOOD GLUCOSE

## 2022-11-14 NOTE — PROGRESS NOTE ADULT - PROBLEM SELECTOR PLAN 1
Weaned off O2, now satting well on RA   S/p remdesivir.   Cont Decadron 6 mg PO daily x 10 days  Isolation precautions

## 2022-11-15 LAB
ALBUMIN SERPL ELPH-MCNC: 3 G/DL — LOW (ref 3.3–5)
ALP SERPL-CCNC: 75 U/L — SIGNIFICANT CHANGE UP (ref 40–120)
ALT FLD-CCNC: 14 U/L — SIGNIFICANT CHANGE UP (ref 10–45)
AST SERPL-CCNC: 16 U/L — SIGNIFICANT CHANGE UP (ref 10–40)
BILIRUB DIRECT SERPL-MCNC: 0.1 MG/DL — SIGNIFICANT CHANGE UP (ref 0–0.3)
BILIRUB INDIRECT FLD-MCNC: 0.3 MG/DL — SIGNIFICANT CHANGE UP (ref 0.2–1)
BILIRUB SERPL-MCNC: 0.4 MG/DL — SIGNIFICANT CHANGE UP (ref 0.2–1.2)
CREAT SERPL-MCNC: 0.54 MG/DL — SIGNIFICANT CHANGE UP (ref 0.5–1.3)
EGFR: 87 ML/MIN/1.73M2 — SIGNIFICANT CHANGE UP
INR BLD: 1.62 RATIO — HIGH (ref 0.88–1.16)
PROT SERPL-MCNC: 6.2 G/DL — SIGNIFICANT CHANGE UP (ref 6–8.3)
PROTHROM AB SERPL-ACNC: 18.7 SEC — HIGH (ref 10.5–13.4)

## 2022-11-15 PROCEDURE — 99232 SBSQ HOSP IP/OBS MODERATE 35: CPT

## 2022-11-15 RX ADMIN — NYSTATIN CREAM 1 APPLICATION(S): 100000 CREAM TOPICAL at 05:31

## 2022-11-15 RX ADMIN — NYSTATIN CREAM 1 APPLICATION(S): 100000 CREAM TOPICAL at 08:02

## 2022-11-15 RX ADMIN — CHLORHEXIDINE GLUCONATE 1 APPLICATION(S): 213 SOLUTION TOPICAL at 05:32

## 2022-11-15 RX ADMIN — APIXABAN 5 MILLIGRAM(S): 2.5 TABLET, FILM COATED ORAL at 05:32

## 2022-11-15 RX ADMIN — CITALOPRAM 20 MILLIGRAM(S): 10 TABLET, FILM COATED ORAL at 21:19

## 2022-11-15 RX ADMIN — APIXABAN 5 MILLIGRAM(S): 2.5 TABLET, FILM COATED ORAL at 18:04

## 2022-11-15 RX ADMIN — Medication 6 MILLIGRAM(S): at 05:32

## 2022-11-15 NOTE — PROGRESS NOTE ADULT - NSPROGADDITIONALINFOA_GEN_ALL_CORE
D/w pt's daughter Marjan Pierre [phone] via phonecall    [facility] Division of Hospital Medicine  Judy Berg MD  Pager (M-F, 8A-5P): [phone]  Other Times:  [phone] [facility] Division of Hospital Medicine  Judy Berg MD  Pager (M-F, 8A-5P): [phone]  Other Times:  [phone]

## 2022-11-15 NOTE — PROGRESS NOTE ADULT - SUBJECTIVE AND OBJECTIVE BOX
PROGRESS NOTE:     Patient is a 89y old  Female who presents with a chief complaint of COVID     SUBJECTIVE / OVERNIGHT EVENTS:  No acute events overnight.     ADDITIONAL REVIEW OF SYSTEMS: negative    MEDICATIONS  (STANDING):  apixaban 5 milliGRAM(s) Oral every 12 hours  chlorhexidine 2% Cloths 1 Application(s) Topical daily  citalopram 20 milliGRAM(s) Oral daily  dexAMETHasone     Tablet 6 milliGRAM(s) Oral daily  influenza  Vaccine (HIGH DOSE) 0.7 milliLiter(s) IntraMuscular once  metoprolol succinate ER 50 milliGRAM(s) Oral daily  nystatin Powder 1 Application(s) Topical two times a day    MEDICATIONS  (PRN):  acetaminophen     Tablet .. 650 milliGRAM(s) Oral every 6 hours PRN Temp greater or equal to 38C (100.4F), Mild Pain (1 - 3)  guaifenesin/dextromethorphan Oral Liquid 10 milliLiter(s) Oral every 4 hours PRN Cough      PHYSICAL EXAM:  T(C): 36.4 (11-15-22 @ 11:33), Max: 36.7 (11-14-22 @ 21:29)  T(F): 97.6 (11-15-22 @ 11:33), Max: 98.1 (11-14-22 @ 21:29)  HR: 73 (11-15-22 @ 11:33) (59 - 73)  BP: 100/69 (11-15-22 @ 11:33) (100/69 - 125/74)  RR: 18 (11-15-22 @ 11:33) (18 - 18)  SpO2: 94% (11-15-22 @ 11:33) (94% - 98%)  Wt(kg): --    GENERAL: Frail appearing elderly woman, in NAD  EYES: EOMI, conjunctiva and sclera clear  ENT: moist mucosa, no pharyngeal erythema  NECK: supple, no JVD  LUNG: Clear to auscultation bilaterally; No rales, rhonchi, wheezing, or rubs.   CVS: Regular rate and rhythm; No murmurs, rubs, or gallops  ABDOMEN: Soft, non tender, nondistended. +Bowel sounds.  EXTREMITIES:  no edema, no cyanosis  NEURO:  Alert & Oriented X3,  No focal deficits  PSYCH: Normal affect, normal mood  MUSCULOSKELETAL: FROM, no joint swelling  Skin: warm and dry, normal color    LABS: reviewed                               12.8   8.01  )-----------( 277      ( 14 Nov 2022 07:01 )             39.3       11-15    x   |  x   |  x   ----------------------------<  x   x    |  x   |  0.54    Ca    8.8      14 Nov 2022 07:01    TPro  6.2  /  Alb  3.0<L>  /  TBili  0.4  /  DBili  0.1  /  AST  16  /  ALT  14  /  AlkPhos  75  11-15                  PT/INR - ( 15 Nov 2022 07:48 )   PT: 18.7 sec;   INR: 1.62 ratio                   CAPILLARY BLOOD GLUCOSE

## 2022-11-15 NOTE — PROGRESS NOTE ADULT - ASSESSMENT
89F PMH of TIA, tachycardia, cardiac aneurysm, and depression admitted with COVID-19 and UTI, now awaiting [facility] placement

## 2022-11-15 NOTE — PROGRESS NOTE ADULT - PROBLEM SELECTOR PLAN 6
Continue citalopram 20 mg PO daily  Family requesting psych consult Continue citalopram 20 mg PO daily  Family requesting psych consult for depression, pt declined at this time

## 2022-11-16 LAB
ALBUMIN SERPL ELPH-MCNC: 3 G/DL — LOW (ref 3.3–5)
ALP SERPL-CCNC: 73 U/L — SIGNIFICANT CHANGE UP (ref 40–120)
ALT FLD-CCNC: 15 U/L — SIGNIFICANT CHANGE UP (ref 10–45)
AST SERPL-CCNC: 17 U/L — SIGNIFICANT CHANGE UP (ref 10–40)
BILIRUB DIRECT SERPL-MCNC: 0.1 MG/DL — SIGNIFICANT CHANGE UP (ref 0–0.3)
BILIRUB INDIRECT FLD-MCNC: 0.3 MG/DL — SIGNIFICANT CHANGE UP (ref 0.2–1)
BILIRUB SERPL-MCNC: 0.4 MG/DL — SIGNIFICANT CHANGE UP (ref 0.2–1.2)
CREAT SERPL-MCNC: 0.5 MG/DL — SIGNIFICANT CHANGE UP (ref 0.5–1.3)
CULTURE RESULTS: SIGNIFICANT CHANGE UP
CULTURE RESULTS: SIGNIFICANT CHANGE UP
EGFR: 88 ML/MIN/1.73M2 — SIGNIFICANT CHANGE UP
INR BLD: 1.41 RATIO — HIGH (ref 0.88–1.16)
PROT SERPL-MCNC: 5.9 G/DL — LOW (ref 6–8.3)
PROTHROM AB SERPL-ACNC: 16.4 SEC — HIGH (ref 10.5–13.4)
SPECIMEN SOURCE: SIGNIFICANT CHANGE UP
SPECIMEN SOURCE: SIGNIFICANT CHANGE UP

## 2022-11-16 PROCEDURE — 99232 SBSQ HOSP IP/OBS MODERATE 35: CPT

## 2022-11-16 RX ADMIN — Medication 50 MILLIGRAM(S): at 05:34

## 2022-11-16 RX ADMIN — CHLORHEXIDINE GLUCONATE 1 APPLICATION(S): 213 SOLUTION TOPICAL at 05:34

## 2022-11-16 RX ADMIN — APIXABAN 5 MILLIGRAM(S): 2.5 TABLET, FILM COATED ORAL at 17:44

## 2022-11-16 RX ADMIN — NYSTATIN CREAM 1 APPLICATION(S): 100000 CREAM TOPICAL at 05:35

## 2022-11-16 RX ADMIN — CITALOPRAM 20 MILLIGRAM(S): 10 TABLET, FILM COATED ORAL at 21:47

## 2022-11-16 RX ADMIN — Medication 6 MILLIGRAM(S): at 05:34

## 2022-11-16 RX ADMIN — APIXABAN 5 MILLIGRAM(S): 2.5 TABLET, FILM COATED ORAL at 05:35

## 2022-11-16 RX ADMIN — NYSTATIN CREAM 1 APPLICATION(S): 100000 CREAM TOPICAL at 17:44

## 2022-11-16 NOTE — PROGRESS NOTE ADULT - PROBLEM SELECTOR PLAN 6
Continue citalopram 20 mg PO daily  Family requesting psych consult for depression, pt declined at this time

## 2022-11-16 NOTE — PROGRESS NOTE ADULT - SUBJECTIVE AND OBJECTIVE BOX
PROGRESS NOTE:     Patient is a 89y old  Female who presents with a chief complaint of COVID     SUBJECTIVE / OVERNIGHT EVENTS:  No acute events overnight.     ADDITIONAL REVIEW OF SYSTEMS: negative    MEDICATIONS  (STANDING):  apixaban 5 milliGRAM(s) Oral every 12 hours  chlorhexidine 2% Cloths 1 Application(s) Topical daily  citalopram 20 milliGRAM(s) Oral daily  dexAMETHasone     Tablet 6 milliGRAM(s) Oral daily  influenza  Vaccine (HIGH DOSE) 0.7 milliLiter(s) IntraMuscular once  metoprolol succinate ER 50 milliGRAM(s) Oral daily  nystatin Powder 1 Application(s) Topical two times a day    MEDICATIONS  (PRN):  acetaminophen     Tablet .. 650 milliGRAM(s) Oral every 6 hours PRN Temp greater or equal to 38C (100.4F), Mild Pain (1 - 3)  guaifenesin/dextromethorphan Oral Liquid 10 milliLiter(s) Oral every 4 hours PRN Cough      PHYSICAL EXAM:  T(C): 36.4 (11-16-22 @ 11:35), Max: 36.4 (11-16-22 @ 11:35)  T(F): 97.5 (11-16-22 @ 11:35), Max: 97.5 (11-16-22 @ 11:35)  HR: 88 (11-16-22 @ 11:35) (69 - 88)  BP: 129/75 (11-16-22 @ 11:35) (119/72 - 129/75)  RR: 18 (11-16-22 @ 11:36) (18 - 19)  SpO2: 95% (11-16-22 @ 11:36) (92% - 96%)  Wt(kg): --    GENERAL: Frail appearing elderly woman, in NAD  EYES: EOMI, conjunctiva and sclera clear  ENT: moist mucosa, no pharyngeal erythema  NECK: supple, no JVD  LUNG: Clear to auscultation bilaterally; No rales, rhonchi, wheezing, or rubs.   CVS: Regular rate and rhythm; No murmurs, rubs, or gallops  ABDOMEN: Soft, non tender, nondistended. +Bowel sounds.  EXTREMITIES:  no edema, no cyanosis  NEURO:  Alert & Oriented X3,  No focal deficits  PSYCH: Normal affect, normal mood  MUSCULOSKELETAL: FROM, no joint swelling  Skin: warm and dry, normal color    LABS: reviewed                                11-16    x   |  x   |  x   ----------------------------<  x   x    |  x   |  0.50      TPro  5.9<L>  /  Alb  3.0<L>  /  TBili  0.4  /  DBili  0.1  /  AST  17  /  ALT  15  /  AlkPhos  73  11-16                  PT/INR - ( 16 Nov 2022 06:20 )   PT: 16.4 sec;   INR: 1.41 ratio                   CAPILLARY BLOOD GLUCOSE

## 2022-11-16 NOTE — PROGRESS NOTE ADULT - NSPROGADDITIONALINFOA_GEN_ALL_CORE
[facility] Division of Hospital Medicine  Judy Berg MD  Pager (M-F, 8A-5P): [phone]  Other Times:  [phone]

## 2022-11-17 ENCOUNTER — TRANSCRIPTION ENCOUNTER (OUTPATIENT)
Age: 87
End: 2022-11-17

## 2022-11-17 LAB
ALBUMIN SERPL ELPH-MCNC: 3.6 G/DL — SIGNIFICANT CHANGE UP (ref 3.3–5)
ALP SERPL-CCNC: 81 U/L — SIGNIFICANT CHANGE UP (ref 40–120)
ALT FLD-CCNC: 15 U/L — SIGNIFICANT CHANGE UP (ref 10–45)
AST SERPL-CCNC: 14 U/L — SIGNIFICANT CHANGE UP (ref 10–40)
BILIRUB DIRECT SERPL-MCNC: 0.1 MG/DL — SIGNIFICANT CHANGE UP (ref 0–0.3)
BILIRUB INDIRECT FLD-MCNC: 0.3 MG/DL — SIGNIFICANT CHANGE UP (ref 0.2–1)
BILIRUB SERPL-MCNC: 0.4 MG/DL — SIGNIFICANT CHANGE UP (ref 0.2–1.2)
CREAT SERPL-MCNC: 0.52 MG/DL — SIGNIFICANT CHANGE UP (ref 0.5–1.3)
EGFR: 88 ML/MIN/1.73M2 — SIGNIFICANT CHANGE UP
GLUCOSE BLDC GLUCOMTR-MCNC: 147 MG/DL — HIGH (ref 70–99)
INR BLD: 1.28 RATIO — HIGH (ref 0.88–1.16)
PROT SERPL-MCNC: 6.5 G/DL — SIGNIFICANT CHANGE UP (ref 6–8.3)
PROTHROM AB SERPL-ACNC: 14.7 SEC — HIGH (ref 10.5–13.4)

## 2022-11-17 PROCEDURE — 99232 SBSQ HOSP IP/OBS MODERATE 35: CPT

## 2022-11-17 RX ADMIN — Medication 6 MILLIGRAM(S): at 05:32

## 2022-11-17 RX ADMIN — NYSTATIN CREAM 1 APPLICATION(S): 100000 CREAM TOPICAL at 18:09

## 2022-11-17 RX ADMIN — NYSTATIN CREAM 1 APPLICATION(S): 100000 CREAM TOPICAL at 06:19

## 2022-11-17 RX ADMIN — APIXABAN 5 MILLIGRAM(S): 2.5 TABLET, FILM COATED ORAL at 18:09

## 2022-11-17 RX ADMIN — Medication 50 MILLIGRAM(S): at 05:32

## 2022-11-17 RX ADMIN — CHLORHEXIDINE GLUCONATE 1 APPLICATION(S): 213 SOLUTION TOPICAL at 05:38

## 2022-11-17 RX ADMIN — CITALOPRAM 20 MILLIGRAM(S): 10 TABLET, FILM COATED ORAL at 22:21

## 2022-11-17 RX ADMIN — APIXABAN 5 MILLIGRAM(S): 2.5 TABLET, FILM COATED ORAL at 05:32

## 2022-11-17 NOTE — DIETITIAN INITIAL EVALUATION ADULT - PERTINENT MEDS FT
MEDICATIONS  (STANDING):  apixaban 5 milliGRAM(s) Oral every 12 hours  chlorhexidine 2% Cloths 1 Application(s) Topical daily  citalopram 20 milliGRAM(s) Oral daily  dexAMETHasone     Tablet 6 milliGRAM(s) Oral daily  influenza  Vaccine (HIGH DOSE) 0.7 milliLiter(s) IntraMuscular once  metoprolol succinate ER 50 milliGRAM(s) Oral daily  nystatin Powder 1 Application(s) Topical two times a day    MEDICATIONS  (PRN):  acetaminophen     Tablet .. 650 milliGRAM(s) Oral every 6 hours PRN Temp greater or equal to 38C (100.4F), Mild Pain (1 - 3)  guaifenesin/dextromethorphan Oral Liquid 10 milliLiter(s) Oral every 4 hours PRN Cough   Rituxan Counseling:  I discussed with the patient the risks of Rituxan infusions. Side effects can include infusion reactions, severe drug rashes including mucocutaneous reactions, reactivation of latent hepatitis and other infections and rarely progressive multifocal leukoencephalopathy.  All of the patient's questions and concerns were addressed.

## 2022-11-17 NOTE — DISCHARGE NOTE PROVIDER - NSDCCPCAREPLAN_GEN_ALL_CORE_FT
PRINCIPAL DISCHARGE DIAGNOSIS  Diagnosis: 2019 novel coronavirus disease (COVID-19)  Assessment and Plan of Treatment: You tested positive for COVID 19.  You no longer require hospitalization.  Please restrict activities outside of your home except for getting medical care.  Do not go to work, school, or public areas.  Avoid using public transportation, ride-sharing, or taxis.  Separate yourself from other people and animals in your home.  Call ahead before visiting your doctor.  Wear a facemask when you are around other people. Cover your cough and sneezes.  Clean your hands often.  Avoid sharing personal household items.  Clean all frequently touched surfaces daily.        SECONDARY DISCHARGE DIAGNOSES  Diagnosis: UTI (urinary tract infection), uncomplicated  Assessment and Plan of Treatment: HOME CARE INSTRUCTIONS  f you were prescribed antibiotics, take them exactly as your caregiver instructs you. Finish the medication even if you feel better after you have only taken some of the medication.  Drink enough water and fluids to keep your urine clear or pale yellow.  Avoid caffeine, tea, and carbonated beverages. They tend to irritate your bladder.  Empty your bladder often. Avoid holding urine for long periods of time.  Empty your bladder before and after sexual intercourse.  After a bowel movement, women should cleanse from front to back. Use each tissue only once.  SEEK MEDICAL CARE IF:  You have back pain.  You develop a fever.  Your symptoms do not begin to resolve within 3 days.  SEEK IMMEDIATE MEDICAL CARE IF:  You have severe back pain or lower abdominal pain.  You develop chills.  You have nausea or vomiting.  You have continued burning or discomfort with urination.      Diagnosis: Falls  Assessment and Plan of Treatment: Fall prevention.    Diagnosis: Supratherapeutic INR  Assessment and Plan of Treatment: Resolved.   Monitor INR closely.     PRINCIPAL DISCHARGE DIAGNOSIS  Diagnosis: 2019 novel coronavirus disease (COVID-19)  Assessment and Plan of Treatment: You tested positive for COVID 19.  You no longer require hospitalization.  Please restrict activities outside of your home except for getting medical care.  Do not go to work, school, or public areas.  Avoid using public transportation, ride-sharing, or taxis.  Separate yourself from other people and animals in your home.  Call ahead before visiting your doctor.  Wear a facemask when you are around other people. Cover your cough and sneezes.  Clean your hands often.  Avoid sharing personal household items.  Clean all frequently touched surfaces daily.        SECONDARY DISCHARGE DIAGNOSES  Diagnosis: UTI (urinary tract infection), uncomplicated  Assessment and Plan of Treatment: HOME CARE INSTRUCTIONS  f you were prescribed antibiotics, take them exactly as your caregiver instructs you. Finish the medication even if you feel better after you have only taken some of the medication.  Drink enough water and fluids to keep your urine clear or pale yellow.  Avoid caffeine, tea, and carbonated beverages. They tend to irritate your bladder.  Empty your bladder often. Avoid holding urine for long periods of time.  Empty your bladder before and after sexual intercourse.  After a bowel movement, women should cleanse from front to back. Use each tissue only once.  SEEK MEDICAL CARE IF:  You have back pain.  You develop a fever.  Your symptoms do not begin to resolve within 3 days.  SEEK IMMEDIATE MEDICAL CARE IF:  You have severe back pain or lower abdominal pain.  You develop chills.  You have nausea or vomiting.  You have continued burning or discomfort with urination.      Diagnosis: Falls  Assessment and Plan of Treatment: Fall prevention.  HOME CARE INSTRUCTIONS  Have someone stay with you until you feel stable.  Do not drive, operate machinery, or play sports until your caregiver says it is okay.  Keep all follow-up appointments as directed by your caregiver.   Lie down right away if you start feeling like you might faint. Breathe deeply and steadily. Wait until all the symptoms have passed.Drink enough fluids to keep your urine clear or pale yellow.  If you are taking blood pressure or heart medicine, get up slowly, taking several minutes to sit and then stand. This can reduce dizziness.  SEEK IMMEDIATE MEDICAL CARE IF:  You have a severe headache.  You have unusual pain in the chest, abdomen, or back.  You are bleeding from the mouth or rectum, or you have black or tarry stool.  You have an irregular or very fast heartbeat.  You have pain with breathing.  You have repeated fainting or seizure-like jerking during an episode.  You faint when sitting or lying down.  You have confusion.  You have difficulty walking.  You have severe weakness.  You have vision problems.      Diagnosis: Supratherapeutic INR  Assessment and Plan of Treatment: Follow up with PMD and cardiology as you are on eliquis for Afib.    Diagnosis: Episodic atrial flutter  Assessment and Plan of Treatment: Atrial flutter is the most common heart rhythm problem.  The condition puts you at risk for has stroke and heart attack  It helps if you control your blood pressure, not drink more than 1-2 alcohol drinks per day, cut down on caffeine, getting treatment for over active thyroid gland, and get regular exercise  Call your doctor if you feel your heart racing or beating unusually, chest tightness or pain, lightheaded, faint, shortness of breath especially with exercise  It is important to take your heart medication as prescribed  You may be on anticoagulation which is very important to take as directed - you may need blood work to monitor drug levels      Diagnosis: History of depression  Assessment and Plan of Treatment: SEEK IMMEDIATE CARE IF  You have thoughts about hurting yourself or others.  You lose touch with reality (have psychotic symptoms).  You are taking medicine for depression and have a serious side effect

## 2022-11-17 NOTE — DIETITIAN INITIAL EVALUATION ADULT - ORAL INTAKE PTA/DIET HISTORY
spoke with daughter over the phone. pt consumes eggs for breakfast, soft sandwich for lunch and pasta for dinner. snacks on ice cream and cake. dislikes supplements.

## 2022-11-17 NOTE — PROGRESS NOTE ADULT - SUBJECTIVE AND OBJECTIVE BOX
PROGRESS NOTE:     Patient is a 89y old  Female who presents with a chief complaint of COVID     SUBJECTIVE / OVERNIGHT EVENTS:  No acute events overnight.     ADDITIONAL REVIEW OF SYSTEMS: negative    MEDICATIONS  (STANDING):  apixaban 5 milliGRAM(s) Oral every 12 hours  chlorhexidine 2% Cloths 1 Application(s) Topical daily  citalopram 20 milliGRAM(s) Oral daily  dexAMETHasone     Tablet 6 milliGRAM(s) Oral daily  influenza  Vaccine (HIGH DOSE) 0.7 milliLiter(s) IntraMuscular once  metoprolol succinate ER 50 milliGRAM(s) Oral daily  nystatin Powder 1 Application(s) Topical two times a day    MEDICATIONS  (PRN):  acetaminophen     Tablet .. 650 milliGRAM(s) Oral every 6 hours PRN Temp greater or equal to 38C (100.4F), Mild Pain (1 - 3)  guaifenesin/dextromethorphan Oral Liquid 10 milliLiter(s) Oral every 4 hours PRN Cough      PHYSICAL EXAM:  T(C): 36.6 (11-17-22 @ 11:52), Max: 36.8 (11-16-22 @ 20:55)  T(F): 97.9 (11-17-22 @ 11:52), Max: 98.3 (11-16-22 @ 20:55)  HR: 73 (11-17-22 @ 11:52) (58 - 73)  BP: 100/66 (11-17-22 @ 11:52) (100/66 - 129/76)  RR: 18 (11-17-22 @ 11:52) (18 - 18)  SpO2: 96% (11-17-22 @ 11:52) (96% - 97%)  Wt(kg): --    GENERAL: Frail appearing elderly woman, in NAD  EYES: EOMI, conjunctiva and sclera clear  ENT: moist mucosa, no pharyngeal erythema  NECK: supple, no JVD  LUNG: Clear to auscultation bilaterally; No rales, rhonchi, wheezing, or rubs.   CVS: Regular rate and rhythm; No murmurs, rubs, or gallops  ABDOMEN: Soft, non tender, nondistended. +Bowel sounds.  EXTREMITIES:  no edema, no cyanosis  NEURO:  Alert & Oriented X3,  No focal deficits  PSYCH: Normal affect, normal mood  MUSCULOSKELETAL: FROM, no joint swelling  Skin: warm and dry, normal color    LABS: reviewed                           11-17    x   |  x   |  x   ----------------------------<  x   x    |  x   |  0.52      TPro  6.5  /  Alb  3.6  /  TBili  0.4  /  DBili  0.1  /  AST  14  /  ALT  15  /  AlkPhos  81  11-17                  PT/INR - ( 17 Nov 2022 06:25 )   PT: 14.7 sec;   INR: 1.28 ratio                   CAPILLARY BLOOD GLUCOSE

## 2022-11-17 NOTE — DIETITIAN INITIAL EVALUATION ADULT - PERTINENT LABORATORY DATA
11-17    x   |  x   |  x   ----------------------------<  x   x    |  x   |  0.52      TPro  6.5  /  Alb  3.6  /  TBili  0.4  /  DBili  0.1  /  AST  14  /  ALT  15  /  AlkPhos  81  11-17  A1C with Estimated Average Glucose Result: 5.5 % (11-09-22 @ 05:46)

## 2022-11-17 NOTE — DISCHARGE NOTE PROVIDER - NSFOLLOWUPCLINICS_GEN_ALL_ED_FT
[facility] - Ophthalmology  Ophthalmology  [address], [address]  [place] [address]  Phone: [phone]  Fax:     [facility] - Primary Care  Primary Care  [address] [place] [address]  Phone: [phone]  Fax:

## 2022-11-17 NOTE — PROGRESS NOTE ADULT - NSPROGADDITIONALINFOA_GEN_ALL_CORE
D/W pt's daughter Yancy via phonecall at bedside.    [facility] Division of Hospital Medicine  Judy Berg MD  Pager (M-F, 8A-5P): [phone]  Other Times:  [phone]

## 2022-11-17 NOTE — DISCHARGE NOTE PROVIDER - HOSPITAL COURSE
89F PMH of TIA, tachycardia, cardiac aneurysm, and depression admitted with COVID-19 and UTI, now awaiting [facility] placement     Problem/Plan - 1:  ·  Problem: 2019 novel coronavirus disease (COVID-19).   ·  Plan: Weaned off O2, now satting well on RA   S/p remdesivir.   Cont Decadron 6 mg PO daily x 10 days  Isolation precautions.     Problem/Plan - 2:  ·  Problem: Urinary tract infection with fever.   ·  Plan: Ecoli UTI, s/p 3 days of ceftriaxone (11/10-- 11/12)   Resolved.     Problem/Plan - 3:  ·  Problem: Atrial fibrillation and flutter.   ·  Plan: Patient reports history of heart palpitations at home  Found to have new onset Atrial fibrillation/Atrial flutter during this admission   - currently converted to sinus rhythm HR 60s-80s with occasional PACs  - c/w metoprolol 50mg PO daily  - c/w eliquis 5mg q12h.     Problem/Plan - 4:  ·  Problem: Elevated INR.   ·  Plan: s/p oral vitamin K 10mg x1 on 11/13  - Pt is on eliquis which can also contribute to elevated INR.     Problem/Plan - 5:  ·  Problem: Weakness generalized.   ·  Plan: In setting of COVID-19 and UTI with fever  PT eval recommending [facility]  Fall precautions.     Problem/Plan - 6:  ·  Problem: Major depression, chronic.   ·  Plan: Continue citalopram 20 mg PO daily  Family requesting psych consult for depression, pt declined at this time.      [facility] requires 10 days of isolation prior to accepting pt, earliest saturday 11/20.  Pt is hemodynamically stable for discharge. 89F PMH of TIA, tachycardia, cardiac aneurysm, and depression admitted with COVID-19 and UTI, with course c/b aspiration event causing and aspiration pneumonitis.     Problem/Plan - 1:  ·  Problem: 2019 novel coronavirus disease (COVID-19).   ·  Plan: Weaned off O2, now satting well on RA   S/p remdesivir.   Cont Decadron 6 mg PO daily x 10 days  Isolation precautions.     Problem/Plan - 2:  ·  Problem: Urinary tract infection with fever.   ·  Plan: Ecoli UTI, s/p 3 days of ceftriaxone (11/10-- 11/12)   Resolved.     Problem/Plan - 3:  ·  Problem: Atrial fibrillation and flutter.   ·  Plan: Patient reports history of heart palpitations at home  Found to have new onset Atrial fibrillation/Atrial flutter during this admission   - currently converted to sinus rhythm HR 60s-80s with occasional PACs  - c/w metoprolol 50mg PO daily  - c/w eliquis 5mg q12h.     Problem/Plan - 4:  ·  Problem: Elevated INR.   ·  Plan: s/p oral vitamin K 10mg x1 on 11/13  - Pt is on eliquis which can also contribute to elevated INR.     Problem/Plan - 5:  ·  Problem: Weakness generalized.   ·  Plan: In setting of COVID-19 and UTI with fever  PT eval recommending [facility]  Fall precautions.     Problem/Plan - 6:  ·  Problem: Major depression, chronic.   ·  Plan: Continue citalopram 20 mg PO daily  Family requesting psych consult for depression, pt declined at this time.      [facility] requires 10 days of isolation prior to accepting pt, earliest saturday 11/20.  Pt is hemodynamically stable for discharge.

## 2022-11-17 NOTE — DISCHARGE NOTE PROVIDER - NSDCMRMEDTOKEN_GEN_ALL_CORE_FT
citalopram 20 mg oral tablet: 1 tab(s) orally once a day  Toprol-XL 25 mg oral tablet, extended release: 0.5 tab(s) orally once a day   apixaban 5 mg oral tablet: 1 tab(s) orally every 12 hours  citalopram 20 mg oral tablet: 1 tab(s) orally once a day  metoprolol succinate 50 mg oral tablet, extended release: 1 tab(s) orally once a day   apixaban 5 mg oral tablet: 1 tab(s) orally every 12 hours  citalopram 20 mg oral tablet: 1 tab(s) orally once a day  clotrimazole-betamethasone dipropionate 1%-0.05% topical cream: 1 application topically 2 times a day  metoprolol succinate 50 mg oral tablet, extended release: 1 tab(s) orally once a day

## 2022-11-17 NOTE — DIETITIAN INITIAL EVALUATION ADULT - PROBLEM/PLAN-5
Note Text (......Xxx Chief Complaint.): This diagnosis correlates with the Detail Level: Simple Other (Free Text): Patient is having shoulder surgery. Defer biopsy to 09/14/2017. DISPLAY PLAN FREE TEXT

## 2022-11-18 DIAGNOSIS — J69.0 PNEUMONITIS DUE TO INHALATION OF FOOD AND VOMIT: ICD-10-CM

## 2022-11-18 LAB
ALBUMIN SERPL ELPH-MCNC: 3 G/DL — LOW (ref 3.3–5)
ALBUMIN SERPL ELPH-MCNC: 3.2 G/DL — LOW (ref 3.3–5)
ALP SERPL-CCNC: 76 U/L — SIGNIFICANT CHANGE UP (ref 40–120)
ALP SERPL-CCNC: 87 U/L — SIGNIFICANT CHANGE UP (ref 40–120)
ALT FLD-CCNC: 13 U/L — SIGNIFICANT CHANGE UP (ref 10–45)
ALT FLD-CCNC: 85 U/L — HIGH (ref 10–45)
ANION GAP SERPL CALC-SCNC: 19 MMOL/L — HIGH (ref 5–17)
ANION GAP SERPL CALC-SCNC: 9 MMOL/L — SIGNIFICANT CHANGE UP (ref 5–17)
AST SERPL-CCNC: 121 U/L — HIGH (ref 10–40)
AST SERPL-CCNC: 13 U/L — SIGNIFICANT CHANGE UP (ref 10–40)
BASOPHILS # BLD AUTO: 0 K/UL — SIGNIFICANT CHANGE UP (ref 0–0.2)
BASOPHILS NFR BLD AUTO: 0 % — SIGNIFICANT CHANGE UP (ref 0–2)
BILIRUB DIRECT SERPL-MCNC: 0.1 MG/DL — SIGNIFICANT CHANGE UP (ref 0–0.3)
BILIRUB INDIRECT FLD-MCNC: 0.2 MG/DL — SIGNIFICANT CHANGE UP (ref 0.2–1)
BILIRUB SERPL-MCNC: 0.3 MG/DL — SIGNIFICANT CHANGE UP (ref 0.2–1.2)
BILIRUB SERPL-MCNC: 0.4 MG/DL — SIGNIFICANT CHANGE UP (ref 0.2–1.2)
BUN SERPL-MCNC: 20 MG/DL — SIGNIFICANT CHANGE UP (ref 7–23)
BUN SERPL-MCNC: 20 MG/DL — SIGNIFICANT CHANGE UP (ref 7–23)
BURR CELLS BLD QL SMEAR: SLIGHT — SIGNIFICANT CHANGE UP
CALCIUM SERPL-MCNC: 8.6 MG/DL — SIGNIFICANT CHANGE UP (ref 8.4–10.5)
CALCIUM SERPL-MCNC: 9 MG/DL — SIGNIFICANT CHANGE UP (ref 8.4–10.5)
CHLORIDE SERPL-SCNC: 101 MMOL/L — SIGNIFICANT CHANGE UP (ref 96–108)
CHLORIDE SERPL-SCNC: 97 MMOL/L — SIGNIFICANT CHANGE UP (ref 96–108)
CO2 SERPL-SCNC: 20 MMOL/L — LOW (ref 22–31)
CO2 SERPL-SCNC: 27 MMOL/L — SIGNIFICANT CHANGE UP (ref 22–31)
CREAT SERPL-MCNC: 0.47 MG/DL — LOW (ref 0.5–1.3)
CREAT SERPL-MCNC: 0.5 MG/DL — SIGNIFICANT CHANGE UP (ref 0.5–1.3)
CREAT SERPL-MCNC: 0.54 MG/DL — SIGNIFICANT CHANGE UP (ref 0.5–1.3)
EGFR: 87 ML/MIN/1.73M2 — SIGNIFICANT CHANGE UP
EGFR: 88 ML/MIN/1.73M2 — SIGNIFICANT CHANGE UP
EGFR: 90 ML/MIN/1.73M2 — SIGNIFICANT CHANGE UP
ELLIPTOCYTES BLD QL SMEAR: SLIGHT — SIGNIFICANT CHANGE UP
EOSINOPHIL # BLD AUTO: 0 K/UL — SIGNIFICANT CHANGE UP (ref 0–0.5)
EOSINOPHIL NFR BLD AUTO: 0 % — SIGNIFICANT CHANGE UP (ref 0–6)
GAS PNL BLDA: SIGNIFICANT CHANGE UP
GLUCOSE BLDC GLUCOMTR-MCNC: 121 MG/DL — HIGH (ref 70–99)
GLUCOSE SERPL-MCNC: 122 MG/DL — HIGH (ref 70–99)
GLUCOSE SERPL-MCNC: 230 MG/DL — HIGH (ref 70–99)
HCT VFR BLD CALC: 43 % — SIGNIFICANT CHANGE UP (ref 34.5–45)
HGB BLD-MCNC: 14 G/DL — SIGNIFICANT CHANGE UP (ref 11.5–15.5)
INR BLD: 1.27 RATIO — HIGH (ref 0.88–1.16)
LYMPHOCYTES # BLD AUTO: 22.6 % — SIGNIFICANT CHANGE UP (ref 13–44)
LYMPHOCYTES # BLD AUTO: 3.82 K/UL — HIGH (ref 1–3.3)
MAGNESIUM SERPL-MCNC: 2.3 MG/DL — SIGNIFICANT CHANGE UP (ref 1.6–2.6)
MANUAL SMEAR VERIFICATION: SIGNIFICANT CHANGE UP
MCHC RBC-ENTMCNC: 30.6 PG — SIGNIFICANT CHANGE UP (ref 27–34)
MCHC RBC-ENTMCNC: 32.6 GM/DL — SIGNIFICANT CHANGE UP (ref 32–36)
MCV RBC AUTO: 93.9 FL — SIGNIFICANT CHANGE UP (ref 80–100)
MONOCYTES # BLD AUTO: 0.88 K/UL — SIGNIFICANT CHANGE UP (ref 0–0.9)
MONOCYTES NFR BLD AUTO: 5.2 % — SIGNIFICANT CHANGE UP (ref 2–14)
MYELOCYTES NFR BLD: 0.9 % — HIGH (ref 0–0)
NEUTROPHILS # BLD AUTO: 12.05 K/UL — HIGH (ref 1.8–7.4)
NEUTROPHILS NFR BLD AUTO: 71.3 % — SIGNIFICANT CHANGE UP (ref 43–77)
PHOSPHATE SERPL-MCNC: 6.1 MG/DL — HIGH (ref 2.5–4.5)
PLAT MORPH BLD: NORMAL — SIGNIFICANT CHANGE UP
PLATELET # BLD AUTO: 404 K/UL — HIGH (ref 150–400)
POIKILOCYTOSIS BLD QL AUTO: SIGNIFICANT CHANGE UP
POTASSIUM SERPL-MCNC: 4.1 MMOL/L — SIGNIFICANT CHANGE UP (ref 3.5–5.3)
POTASSIUM SERPL-MCNC: 4.5 MMOL/L — SIGNIFICANT CHANGE UP (ref 3.5–5.3)
POTASSIUM SERPL-SCNC: 4.1 MMOL/L — SIGNIFICANT CHANGE UP (ref 3.5–5.3)
POTASSIUM SERPL-SCNC: 4.5 MMOL/L — SIGNIFICANT CHANGE UP (ref 3.5–5.3)
PROT SERPL-MCNC: 5.7 G/DL — LOW (ref 6–8.3)
PROT SERPL-MCNC: 6.1 G/DL — SIGNIFICANT CHANGE UP (ref 6–8.3)
PROTHROM AB SERPL-ACNC: 14.8 SEC — HIGH (ref 10.5–13.4)
RBC # BLD: 4.58 M/UL — SIGNIFICANT CHANGE UP (ref 3.8–5.2)
RBC # FLD: 13.2 % — SIGNIFICANT CHANGE UP (ref 10.3–14.5)
RBC BLD AUTO: ABNORMAL
SARS-COV-2 RNA SPEC QL NAA+PROBE: DETECTED
SODIUM SERPL-SCNC: 136 MMOL/L — SIGNIFICANT CHANGE UP (ref 135–145)
SODIUM SERPL-SCNC: 137 MMOL/L — SIGNIFICANT CHANGE UP (ref 135–145)
WBC # BLD: 16.9 K/UL — HIGH (ref 3.8–10.5)
WBC # FLD AUTO: 16.9 K/UL — HIGH (ref 3.8–10.5)

## 2022-11-18 PROCEDURE — 71045 X-RAY EXAM CHEST 1 VIEW: CPT | Mod: 26

## 2022-11-18 PROCEDURE — 99233 SBSQ HOSP IP/OBS HIGH 50: CPT

## 2022-11-18 PROCEDURE — 99223 1ST HOSP IP/OBS HIGH 75: CPT | Mod: GC

## 2022-11-18 RX ORDER — AMPICILLIN SODIUM AND SULBACTAM SODIUM 250; 125 MG/ML; MG/ML
INJECTION, POWDER, FOR SUSPENSION INTRAMUSCULAR; INTRAVENOUS
Refills: 0 | Status: DISCONTINUED | OUTPATIENT
Start: 2022-11-18 | End: 2022-11-18

## 2022-11-18 RX ORDER — APIXABAN 2.5 MG/1
5 TABLET, FILM COATED ORAL EVERY 12 HOURS
Refills: 0 | Status: DISCONTINUED | OUTPATIENT
Start: 2022-11-19 | End: 2022-11-21

## 2022-11-18 RX ORDER — ENOXAPARIN SODIUM 100 MG/ML
70 INJECTION SUBCUTANEOUS ONCE
Refills: 0 | Status: DISCONTINUED | OUTPATIENT
Start: 2022-11-18 | End: 2022-11-18

## 2022-11-18 RX ORDER — SODIUM CHLORIDE 9 MG/ML
1000 INJECTION INTRAMUSCULAR; INTRAVENOUS; SUBCUTANEOUS
Refills: 0 | Status: DISCONTINUED | OUTPATIENT
Start: 2022-11-18 | End: 2022-11-21

## 2022-11-18 RX ORDER — ONDANSETRON 8 MG/1
4 TABLET, FILM COATED ORAL EVERY 8 HOURS
Refills: 0 | Status: DISCONTINUED | OUTPATIENT
Start: 2022-11-18 | End: 2022-11-21

## 2022-11-18 RX ADMIN — CHLORHEXIDINE GLUCONATE 1 APPLICATION(S): 213 SOLUTION TOPICAL at 05:31

## 2022-11-18 RX ADMIN — Medication 50 MILLIGRAM(S): at 05:51

## 2022-11-18 RX ADMIN — Medication 650 MILLIGRAM(S): at 20:15

## 2022-11-18 RX ADMIN — SODIUM CHLORIDE 50 MILLILITER(S): 9 INJECTION INTRAMUSCULAR; INTRAVENOUS; SUBCUTANEOUS at 11:24

## 2022-11-18 RX ADMIN — APIXABAN 5 MILLIGRAM(S): 2.5 TABLET, FILM COATED ORAL at 05:52

## 2022-11-18 RX ADMIN — Medication 6 MILLIGRAM(S): at 05:51

## 2022-11-18 RX ADMIN — NYSTATIN CREAM 1 APPLICATION(S): 100000 CREAM TOPICAL at 18:18

## 2022-11-18 RX ADMIN — CITALOPRAM 20 MILLIGRAM(S): 10 TABLET, FILM COATED ORAL at 21:44

## 2022-11-18 RX ADMIN — APIXABAN 5 MILLIGRAM(S): 2.5 TABLET, FILM COATED ORAL at 18:17

## 2022-11-18 RX ADMIN — NYSTATIN CREAM 1 APPLICATION(S): 100000 CREAM TOPICAL at 05:54

## 2022-11-18 NOTE — PROGRESS NOTE ADULT - ASSESSMENT
89F PMH of TIA, tachycardia, cardiac aneurysm, and depression admitted with COVID-19 and UTI, now awaiting [facility] placement 89F PMH of TIA, tachycardia, cardiac aneurysm, and depression admitted with COVID-19 and UTI, resolved. Course c/b aspiration event 11/18

## 2022-11-18 NOTE — PROGRESS NOTE ADULT - PROBLEM SELECTOR PLAN 6
Continue citalopram 20 mg PO daily  Family requesting psych consult for depression, pt declined at this time In setting of COVID-19 and UTI with fever  PT eval recommending [facility]  Fall precautions

## 2022-11-18 NOTE — PROGRESS NOTE ADULT - SUBJECTIVE AND OBJECTIVE BOX
PROGRESS NOTE:     Patient is a 89y old  Female who presents with a chief complaint of COVID     SUBJECTIVE / OVERNIGHT EVENTS:      ADDITIONAL REVIEW OF SYSTEMS: negative    MEDICATIONS  (STANDING):  apixaban 5 milliGRAM(s) Oral every 12 hours  chlorhexidine 2% Cloths 1 Application(s) Topical daily  citalopram 20 milliGRAM(s) Oral daily  dexAMETHasone     Tablet 6 milliGRAM(s) Oral daily  influenza  Vaccine (HIGH DOSE) 0.7 milliLiter(s) IntraMuscular once  metoprolol succinate ER 50 milliGRAM(s) Oral daily  nystatin Powder 1 Application(s) Topical two times a day  sodium chloride 0.9%. 1000 milliLiter(s) (50 mL/Hr) IV Continuous <Continuous>    MEDICATIONS  (PRN):  acetaminophen     Tablet .. 650 milliGRAM(s) Oral every 6 hours PRN Temp greater or equal to 38C (100.4F), Mild Pain (1 - 3)  guaifenesin/dextromethorphan Oral Liquid 10 milliLiter(s) Oral every 4 hours PRN Cough  ondansetron Injectable 4 milliGRAM(s) IV Push every 8 hours PRN Nausea and/or Vomiting      PHYSICAL EXAM:  T(C): 36.2 (11-18-22 @ 12:00), Max: 36.6 (11-17-22 @ 20:50)  T(F): 97.2 (11-18-22 @ 12:00), Max: 97.9 (11-17-22 @ 20:50)  HR: 65 (11-18-22 @ 12:00) (60 - 65)  BP: 101/64 (11-18-22 @ 12:00) (101/64 - 116/73)  RR: 17 (11-18-22 @ 12:00) (17 - 18)  SpO2: 96% (11-18-22 @ 12:00) (95% - 100%)  Wt(kg): --    GENERAL: Frail appearing elderly woman, in NAD  EYES: EOMI, conjunctiva and sclera clear  ENT: moist mucosa, no pharyngeal erythema  NECK: supple, no JVD  LUNG: Clear to auscultation bilaterally; No rales, rhonchi, wheezing, or rubs.   CVS: Regular rate and rhythm; No murmurs, rubs, or gallops  ABDOMEN: Soft, non tender, nondistended. +Bowel sounds.  EXTREMITIES:  no edema, no cyanosis  NEURO:  Alert & Oriented X3,  No focal deficits  PSYCH: Normal affect, normal mood  MUSCULOSKELETAL: FROM, no joint swelling  Skin: warm and dry, normal color    LABS: reviewed                                            14.0   16.90 )-----------( 404      ( 18 Nov 2022 10:24 )             43.0       11-18    136  |  97  |  20  ----------------------------<  230<H>  4.1   |  20<L>  |  0.54    Ca    9.0      18 Nov 2022 10:24  Phos  6.1     11-18  Mg     2.3     11-18    TPro  6.1  /  Alb  3.2<L>  /  TBili  0.4  /  DBili  x   /  AST  121<H>  /  ALT  85<H>  /  AlkPhos  87  11-18          ABG - ( 18 Nov 2022 10:19 )  pH, Arterial: 7.30  pH, Blood: x     /  pCO2: 46    /  pO2: 210   / HCO3: 23    / Base Excess: -4.0  /  SaO2: 99.5              PT/INR - ( 18 Nov 2022 07:12 )   PT: 14.8 sec;   INR: 1.27 ratio          CAPILLARY BLOOD GLUCOSE      POCT Blood Glucose.: 121 mg/dL (18 Nov 2022 09:01)       PROGRESS NOTE:     Patient is a 89y old  Female who presents with a chief complaint of COVID     SUBJECTIVE / OVERNIGHT EVENTS:  RRT called this morning for unresponsiveness and bradycardia on tele. Pt was eating and vomited, aspirated and went into respiratory distress. She was eventually stabilized after several minutes of suctioning. Upon second encounter, pt is alert, awake, did not remember the circumstances surrounding events this AM but back to baseline mental status.     ADDITIONAL REVIEW OF SYSTEMS: negative    MEDICATIONS  (STANDING):  apixaban 5 milliGRAM(s) Oral every 12 hours  chlorhexidine 2% Cloths 1 Application(s) Topical daily  citalopram 20 milliGRAM(s) Oral daily  dexAMETHasone     Tablet 6 milliGRAM(s) Oral daily  influenza  Vaccine (HIGH DOSE) 0.7 milliLiter(s) IntraMuscular once  metoprolol succinate ER 50 milliGRAM(s) Oral daily  nystatin Powder 1 Application(s) Topical two times a day  sodium chloride 0.9%. 1000 milliLiter(s) (50 mL/Hr) IV Continuous <Continuous>    MEDICATIONS  (PRN):  acetaminophen     Tablet .. 650 milliGRAM(s) Oral every 6 hours PRN Temp greater or equal to 38C (100.4F), Mild Pain (1 - 3)  guaifenesin/dextromethorphan Oral Liquid 10 milliLiter(s) Oral every 4 hours PRN Cough  ondansetron Injectable 4 milliGRAM(s) IV Push every 8 hours PRN Nausea and/or Vomiting      PHYSICAL EXAM:  T(C): 36.2 (11-18-22 @ 12:00), Max: 36.6 (11-17-22 @ 20:50)  T(F): 97.2 (11-18-22 @ 12:00), Max: 97.9 (11-17-22 @ 20:50)  HR: 65 (11-18-22 @ 12:00) (60 - 65)  BP: 101/64 (11-18-22 @ 12:00) (101/64 - 116/73)  RR: 17 (11-18-22 @ 12:00) (17 - 18)  SpO2: 96% (11-18-22 @ 12:00) (95% - 100%)  Wt(kg): --    GENERAL: Frail appearing elderly woman, in NAD  EYES: EOMI, conjunctiva and sclera clear  ENT: moist mucosa, no pharyngeal erythema  NECK: supple, no JVD  LUNG: Clear to auscultation bilaterally; No rales, rhonchi, wheezing, or rubs.   CVS: Regular rate and rhythm; No murmurs, rubs, or gallops  ABDOMEN: Soft, non tender, nondistended. +Bowel sounds.  EXTREMITIES:  no edema, no cyanosis  NEURO:  Alert & Oriented X3,  No focal deficits  PSYCH: Normal affect, normal mood  MUSCULOSKELETAL: FROM, no joint swelling  Skin: warm and dry, normal color    LABS: reviewed                                            14.0   16.90 )-----------( 404      ( 18 Nov 2022 10:24 )             43.0       11-18    136  |  97  |  20  ----------------------------<  230<H>  4.1   |  20<L>  |  0.54    Ca    9.0      18 Nov 2022 10:24  Phos  6.1     11-18  Mg     2.3     11-18    TPro  6.1  /  Alb  3.2<L>  /  TBili  0.4  /  DBili  x   /  AST  121<H>  /  ALT  85<H>  /  AlkPhos  87  11-18          ABG - ( 18 Nov 2022 10:19 )  pH, Arterial: 7.30  pH, Blood: x     /  pCO2: 46    /  pO2: 210   / HCO3: 23    / Base Excess: -4.0  /  SaO2: 99.5              PT/INR - ( 18 Nov 2022 07:12 )   PT: 14.8 sec;   INR: 1.27 ratio          CAPILLARY BLOOD GLUCOSE      POCT Blood Glucose.: 121 mg/dL (18 Nov 2022 09:01)

## 2022-11-18 NOTE — PROGRESS NOTE ADULT - PROBLEM SELECTOR PLAN 5
In setting of COVID-19 and UTI with fever  PT eval recommending [facility]  Fall precautions s/p oral vitamin K 10mg x1 on 11/13  - Pt is on eliquis which can also contribute to elevated INR

## 2022-11-18 NOTE — PROGRESS NOTE ADULT - PROBLEM SELECTOR PLAN 1
Weaned off O2, now satting well on RA   S/p remdesivir.   Cont Decadron 6 mg PO daily x 10 days  Isolation precautions Pt aspirated food contents today; labwork during event with elevated lactate, WBC, LFTs and acidosis likely stress reaction, pt is now stable  CXR during event without pneumonia or debris in airway by my read; f/u official radiology report  gentle hydration  If spikes fever, will send cultures, start unasyn  Cont supportive care, elevate HOB  SLP eval  Monitor on tele

## 2022-11-18 NOTE — PROGRESS NOTE ADULT - PROBLEM SELECTOR PLAN 4
s/p oral vitamin K 10mg x1 on 11/13  - Pt is on eliquis which can also contribute to elevated INR Patient reports history of heart palpitations at home  Found to have new onset Atrial fibrillation/Atrial flutter during this admission   - currently converted to sinus rhythm HR 60s-80s with occasional PACs  - c/w metoprolol 50mg PO daily  - c/w eliquis 5mg q12h

## 2022-11-18 NOTE — PROGRESS NOTE ADULT - PROBLEM SELECTOR PLAN 3
Patient reports history of heart palpitations at home  Found to have new onset Atrial fibrillation/Atrial flutter during this admission   - currently converted to sinus rhythm HR 60s-80s with occasional PACs  - c/w metoprolol 50mg PO daily  - c/w eliquis 5mg q12h Ecoli UTI, s/p 3 days of ceftriaxone (11/10-- 11/12)   Resolved

## 2022-11-18 NOTE — PROGRESS NOTE ADULT - PROBLEM SELECTOR PLAN 7
[facility] requires 10 days of isolation prior to accepting pt, earliest saturday 11/20 Continue citalopram 20 mg PO daily  Family requesting psych consult for depression, pt declined at this time

## 2022-11-18 NOTE — RAPID RESPONSE TEAM SUMMARY - NSADDTLFINDINGSRRT_GEN_ALL_CORE
On arrival patient agonally breathing, thready pulse palpated. Gross gastric contents noted in mouth and side of mouth. Patient orally suctioned. Pulses possibly briefly lost prompting initiation of CPR however patient opened her eyes ~5s after initiation of compressions, likely pulse was present throughout. Rhythm showing afib throughout rapid response. Patient nasotracheally suctioned with gross return of thick gastric like contents. Patient's breathing pattern improved. BP 110s/60s. SpO2 100% on NRB 15lpm. Labs including ABG performed. Attending spoke with daughter during RRT and patient was made DNR with trial of intubation. Intubation planned until patient's mental status and breathing pattern improved. Will keep patient on floor with q4h suctioning. Urgent CXR ordered. MICU consultation called for higher level of care. Airway clearance ordered and patient made NPO.

## 2022-11-18 NOTE — RAPID RESPONSE TEAM SUMMARY - NSSITUATIONBACKGROUNDRRT_GEN_ALL_CORE
Briefly 89yo F w/ PMH of TIA, tachycardia cardiac aneurysm, multiple falls and depression presenting with UTI and covid-19. S/p course of CTX and remdesivir respectively, remains on dexamethasone to complete 10d course. Code blue called overhead prompting mobilization of code team.

## 2022-11-18 NOTE — PROGRESS NOTE ADULT - NSPROGADDITIONALINFOA_GEN_ALL_CORE
D/W pt's daughter Yancy via phonecall at bedside.    [facility] Division of Hospital Medicine  Judy Berg MD  Pager (M-F, 8A-5P): [phone]  Other Times:  [phone] D/W pt's daughter Marjan at bedside.    [facility] Division of Hospital Medicine  Judy Berg MD  Pager (M-F, 8A-5P): [phone]  Other Times:  [phone]

## 2022-11-19 LAB
ALBUMIN SERPL ELPH-MCNC: 2.8 G/DL — LOW (ref 3.3–5)
ALP SERPL-CCNC: 70 U/L — SIGNIFICANT CHANGE UP (ref 40–120)
ALT FLD-CCNC: 77 U/L — HIGH (ref 10–45)
AST SERPL-CCNC: 64 U/L — HIGH (ref 10–40)
BILIRUB DIRECT SERPL-MCNC: 0.1 MG/DL — SIGNIFICANT CHANGE UP (ref 0–0.3)
BILIRUB INDIRECT FLD-MCNC: 0.5 MG/DL — SIGNIFICANT CHANGE UP (ref 0.2–1)
BILIRUB SERPL-MCNC: 0.6 MG/DL — SIGNIFICANT CHANGE UP (ref 0.2–1.2)
CREAT SERPL-MCNC: 0.5 MG/DL — SIGNIFICANT CHANGE UP (ref 0.5–1.3)
EGFR: 88 ML/MIN/1.73M2 — SIGNIFICANT CHANGE UP
INR BLD: 1.32 RATIO — HIGH (ref 0.88–1.16)
PROT SERPL-MCNC: 5.6 G/DL — LOW (ref 6–8.3)
PROTHROM AB SERPL-ACNC: 15.3 SEC — HIGH (ref 10.5–13.4)

## 2022-11-19 PROCEDURE — 99233 SBSQ HOSP IP/OBS HIGH 50: CPT

## 2022-11-19 RX ADMIN — Medication 6 MILLIGRAM(S): at 05:32

## 2022-11-19 RX ADMIN — APIXABAN 5 MILLIGRAM(S): 2.5 TABLET, FILM COATED ORAL at 22:53

## 2022-11-19 RX ADMIN — NYSTATIN CREAM 1 APPLICATION(S): 100000 CREAM TOPICAL at 17:19

## 2022-11-19 RX ADMIN — CITALOPRAM 20 MILLIGRAM(S): 10 TABLET, FILM COATED ORAL at 22:53

## 2022-11-19 RX ADMIN — APIXABAN 5 MILLIGRAM(S): 2.5 TABLET, FILM COATED ORAL at 09:40

## 2022-11-19 RX ADMIN — SODIUM CHLORIDE 50 MILLILITER(S): 9 INJECTION INTRAMUSCULAR; INTRAVENOUS; SUBCUTANEOUS at 09:49

## 2022-11-19 RX ADMIN — Medication 50 MILLIGRAM(S): at 09:40

## 2022-11-19 RX ADMIN — CHLORHEXIDINE GLUCONATE 1 APPLICATION(S): 213 SOLUTION TOPICAL at 05:04

## 2022-11-19 RX ADMIN — NYSTATIN CREAM 1 APPLICATION(S): 100000 CREAM TOPICAL at 05:36

## 2022-11-19 NOTE — PROGRESS NOTE ADULT - PROBLEM SELECTOR PLAN 1
Pt aspirated food contents 11/19; labwork during event with elevated lactate, WBC, LFTs and acidosis likely stress reaction, pt is now stable  CXR during event without pneumonia or debris in airway by my read; f/u official radiology report  gentle hydration  If spikes fever, will send cultures, start unasyn  Cont supportive care, elevate HOB  SLP eval  Monitor on tele

## 2022-11-19 NOTE — PROGRESS NOTE ADULT - SUBJECTIVE AND OBJECTIVE BOX
PROGRESS NOTE:     Patient is a 89y old  Female who presents with a chief complaint of COVID     SUBJECTIVE / OVERNIGHT EVENTS:  No acute events    ADDITIONAL REVIEW OF SYSTEMS: No coughing, SOB or chest pain; otherwise negative    MEDICATIONS  (STANDING):  apixaban 5 milliGRAM(s) Oral every 12 hours  chlorhexidine 2% Cloths 1 Application(s) Topical daily  citalopram 20 milliGRAM(s) Oral daily  dexAMETHasone     Tablet 6 milliGRAM(s) Oral daily  influenza  Vaccine (HIGH DOSE) 0.7 milliLiter(s) IntraMuscular once  metoprolol succinate ER 50 milliGRAM(s) Oral daily  nystatin Powder 1 Application(s) Topical two times a day  sodium chloride 0.9%. 1000 milliLiter(s) (50 mL/Hr) IV Continuous <Continuous>    MEDICATIONS  (PRN):  acetaminophen     Tablet .. 650 milliGRAM(s) Oral every 6 hours PRN Temp greater or equal to 38C (100.4F), Mild Pain (1 - 3)  guaifenesin/dextromethorphan Oral Liquid 10 milliLiter(s) Oral every 4 hours PRN Cough  ondansetron Injectable 4 milliGRAM(s) IV Push every 8 hours PRN Nausea and/or Vomiting      PHYSICAL EXAM:  Vital Signs Last 24 Hrs  T(C): 36.2 (19 Nov 2022 18:00), Max: 36.7 (19 Nov 2022 00:14)  T(F): 97.2 (19 Nov 2022 18:00), Max: 98 (19 Nov 2022 00:14)  HR: 60 (19 Nov 2022 18:00) (59 - 69)  BP: 124/82 (19 Nov 2022 18:00) (109/69 - 131/69)  BP(mean): --  RR: 17 (19 Nov 2022 18:00) (16 - 18)  SpO2: 96% (19 Nov 2022 18:00) (96% - 100%)    Parameters below as of 19 Nov 2022 18:00  Patient On (Oxygen Delivery Method): nasal cannula  O2 Flow (L/min): 2      GENERAL: Frail appearing elderly woman, in NAD  EYES: EOMI, conjunctiva and sclera clear  ENT: moist mucosa, no pharyngeal erythema  NECK: supple, no JVD  LUNG: Clear to auscultation bilaterally; No rales, rhonchi, wheezing, or rubs.   CVS: Regular rate and rhythm; No murmurs, rubs, or gallops  ABDOMEN: Soft, non tender, nondistended. +Bowel sounds.  EXTREMITIES:  no edema, no cyanosis  NEURO:  Alert & Oriented X3,  No focal deficits  PSYCH: Normal affect, normal mood  MUSCULOSKELETAL: FROM, no joint swelling  Skin: warm and dry, normal color    LABS: reviewed                                              14.0   16.90 )-----------( 404      ( 18 Nov 2022 10:24 )             43.0       11-19    x   |  x   |  x   ----------------------------<  x   x    |  x   |  0.50    Ca    8.6      18 Nov 2022 16:37  Phos  6.1     11-18  Mg     2.3     11-18    TPro  5.6<L>  /  Alb  2.8<L>  /  TBili  0.6  /  DBili  0.1  /  AST  64<H>  /  ALT  77<H>  /  AlkPhos  70  11-19          ABG - ( 18 Nov 2022 10:19 )  pH, Arterial: 7.30  pH, Blood: x     /  pCO2: 46    /  pO2: 210   / HCO3: 23    / Base Excess: -4.0  /  SaO2: 99.5                    PT/INR - ( 19 Nov 2022 07:01 )   PT: 15.3 sec;   INR: 1.32 ratio                   CAPILLARY BLOOD GLUCOSE      POCT Blood Glucose.: 121 mg/dL (18 Nov 2022 09:01)

## 2022-11-19 NOTE — PROGRESS NOTE ADULT - ASSESSMENT
89F PMH of TIA, tachycardia, cardiac aneurysm, and depression admitted with COVID-19 and UTI, resolved. Course c/b aspiration event 11/18

## 2022-11-19 NOTE — PROVIDER CONTACT NOTE (OTHER) - ACTION/TREATMENT ORDERED:
Pt. initially tested COVID+ on 11/8/22. As this is >10 days and pt. is afebrile and asymptomatic, no further isolation is required as per guidelines. COVID Isolation discontinued on 11/19/22.
5mg Cardizem IV Push given, 12 lead ECG completed and will continue to monitor
No new orders. Continue to monitor and monitor closely AM metabolic labs.

## 2022-11-20 DIAGNOSIS — S21.009A UNSPECIFIED OPEN WOUND OF UNSPECIFIED BREAST, INITIAL ENCOUNTER: ICD-10-CM

## 2022-11-20 LAB
ALBUMIN SERPL ELPH-MCNC: 2.7 G/DL — LOW (ref 3.3–5)
ALP SERPL-CCNC: 63 U/L — SIGNIFICANT CHANGE UP (ref 40–120)
ALT FLD-CCNC: 52 U/L — HIGH (ref 10–45)
AST SERPL-CCNC: 29 U/L — SIGNIFICANT CHANGE UP (ref 10–40)
BASE EXCESS BLDV CALC-SCNC: 5.5 MMOL/L — HIGH (ref -2–3)
BILIRUB DIRECT SERPL-MCNC: 0.2 MG/DL — SIGNIFICANT CHANGE UP (ref 0–0.3)
BILIRUB INDIRECT FLD-MCNC: 0.4 MG/DL — SIGNIFICANT CHANGE UP (ref 0.2–1)
BILIRUB SERPL-MCNC: 0.6 MG/DL — SIGNIFICANT CHANGE UP (ref 0.2–1.2)
CA-I SERPL-SCNC: 1.18 MMOL/L — SIGNIFICANT CHANGE UP (ref 1.15–1.33)
CHLORIDE BLDV-SCNC: 98 MMOL/L — SIGNIFICANT CHANGE UP (ref 96–108)
CO2 BLDV-SCNC: 33 MMOL/L — HIGH (ref 22–26)
CREAT SERPL-MCNC: 0.49 MG/DL — LOW (ref 0.5–1.3)
EGFR: 89 ML/MIN/1.73M2 — SIGNIFICANT CHANGE UP
GAS PNL BLDV: 133 MMOL/L — LOW (ref 136–145)
GAS PNL BLDV: SIGNIFICANT CHANGE UP
GLUCOSE BLDV-MCNC: 73 MG/DL — SIGNIFICANT CHANGE UP (ref 70–99)
HCO3 BLDV-SCNC: 32 MMOL/L — HIGH (ref 22–29)
HCT VFR BLDA CALC: 41 % — SIGNIFICANT CHANGE UP (ref 34.5–46.5)
HGB BLD CALC-MCNC: 13.5 G/DL — SIGNIFICANT CHANGE UP (ref 11.7–16.1)
INR BLD: 1.48 RATIO — HIGH (ref 0.88–1.16)
LACTATE BLDV-MCNC: 1.3 MMOL/L — SIGNIFICANT CHANGE UP (ref 0.5–2)
PCO2 BLDV: 51 MMHG — HIGH (ref 39–42)
PH BLDV: 7.4 — SIGNIFICANT CHANGE UP (ref 7.32–7.43)
PO2 BLDV: 46 MMHG — HIGH (ref 25–45)
POTASSIUM BLDV-SCNC: 3.9 MMOL/L — SIGNIFICANT CHANGE UP (ref 3.5–5.1)
PROT SERPL-MCNC: 5.2 G/DL — LOW (ref 6–8.3)
PROTHROM AB SERPL-ACNC: 17.2 SEC — HIGH (ref 10.5–13.4)
SAO2 % BLDV: 79.1 % — SIGNIFICANT CHANGE UP (ref 67–88)

## 2022-11-20 PROCEDURE — 99233 SBSQ HOSP IP/OBS HIGH 50: CPT

## 2022-11-20 RX ORDER — ACETAMINOPHEN 500 MG
975 TABLET ORAL EVERY 6 HOURS
Refills: 0 | Status: DISCONTINUED | OUTPATIENT
Start: 2022-11-20 | End: 2022-11-21

## 2022-11-20 RX ADMIN — APIXABAN 5 MILLIGRAM(S): 2.5 TABLET, FILM COATED ORAL at 21:56

## 2022-11-20 RX ADMIN — NYSTATIN CREAM 1 APPLICATION(S): 100000 CREAM TOPICAL at 17:02

## 2022-11-20 RX ADMIN — Medication 50 MILLIGRAM(S): at 06:44

## 2022-11-20 RX ADMIN — NYSTATIN CREAM 1 APPLICATION(S): 100000 CREAM TOPICAL at 06:45

## 2022-11-20 RX ADMIN — SODIUM CHLORIDE 50 MILLILITER(S): 9 INJECTION INTRAMUSCULAR; INTRAVENOUS; SUBCUTANEOUS at 22:31

## 2022-11-20 RX ADMIN — APIXABAN 5 MILLIGRAM(S): 2.5 TABLET, FILM COATED ORAL at 09:08

## 2022-11-20 RX ADMIN — Medication 975 MILLIGRAM(S): at 18:01

## 2022-11-20 RX ADMIN — CHLORHEXIDINE GLUCONATE 1 APPLICATION(S): 213 SOLUTION TOPICAL at 06:54

## 2022-11-20 RX ADMIN — CITALOPRAM 20 MILLIGRAM(S): 10 TABLET, FILM COATED ORAL at 21:56

## 2022-11-20 RX ADMIN — Medication 6 MILLIGRAM(S): at 06:44

## 2022-11-20 NOTE — PROGRESS NOTE ADULT - PROBLEM SELECTOR PLAN 1
Pt aspirated food contents 11/19; labwork during event with elevated lactate, WBC, LFTs and acidosis likely stress reaction, pt is now stable  CXR during event without pneumonia or debris in airway by my read; f/u official radiology report  gentle hydration  If spikes fever, will send cultures, start unasyn  Cont supportive care, elevate HOB  SLP eval noted  Monitor on tele

## 2022-11-20 NOTE — SWALLOW BEDSIDE ASSESSMENT ADULT - SLP GENERAL OBSERVATIONS
Pt encountered in bed, asleep but easy to rouse, on 2L NC. Daughter at bedside. Pt is A&Ox3, able to follow commands. Paucity of spontaneous verbal output.

## 2022-11-20 NOTE — SWALLOW BEDSIDE ASSESSMENT ADULT - COMMENTS
11/11: Patient febrile overnight, AF with RVR, required IV diltiazem and metoprolol. Now on 2L NC supp O2.  11/13:  Patient now weaned off O2, satting well on RA. Still has frequent dry cough. now converted to sinus rhythm since 11/12, rate 60s-70s   11/17: Seen by MARIA DE JESUS - shira changing diet to easy to chew as pt reportedly tolerates soft food better   11/18: S/p RRT/ code blue -> RRT called this morning for unresponsiveness and bradycardia on tele. Pt was eating and vomited, aspirated and went into respiratory distress. She was eventually stabilized after several minutes of suctioning. Upon second encounter, pt is alert, awake, did not remember the circumstances surrounding events this AM but back to baseline mental status.   labwork during event with elevated lactate, WBC, LFTs and acidosis likely stress reaction, pt is now stable. CXR during event without pneumonia or debris in airway by my read; f/u official radiology report. Cont supportive care, elevate HOB, SLP eval    Swallow history: Pt seen by this service for bedside swallow evaluation on 3/5/2020 with recommendations for a regular diet.

## 2022-11-20 NOTE — SWALLOW BEDSIDE ASSESSMENT ADULT - ASR SWALLOW RECOMMEND DIAG
Defer instrumental exam at this time, will f/u at the bedside schedule permitting to ensure diet tolerance

## 2022-11-20 NOTE — PROGRESS NOTE ADULT - PROBLEM SELECTOR PLAN 5
Skin breakdown secondary to chest compressions  Wound care consulted  Can apply barrier ointment with hydrofiber dressing BID for now  Tylenol 975mg Q6 x 3 days standing for pain, PRN after

## 2022-11-20 NOTE — PROGRESS NOTE ADULT - SUBJECTIVE AND OBJECTIVE BOX
PROGRESS NOTE:     Patient is a 89y old  Female who presents with a chief complaint of COVID     SUBJECTIVE / OVERNIGHT EVENTS:  No acute events. Reports pain under L breat which per staff is skin breakdown from chest compressions few days ago.     ADDITIONAL REVIEW OF SYSTEMS: No coughing, SOB or chest pain; otherwise negative    MEDICATIONS  (STANDING):  apixaban 5 milliGRAM(s) Oral every 12 hours  chlorhexidine 2% Cloths 1 Application(s) Topical daily  citalopram 20 milliGRAM(s) Oral daily  dexAMETHasone     Tablet 6 milliGRAM(s) Oral daily  influenza  Vaccine (HIGH DOSE) 0.7 milliLiter(s) IntraMuscular once  metoprolol succinate ER 50 milliGRAM(s) Oral daily  nystatin Powder 1 Application(s) Topical two times a day  sodium chloride 0.9%. 1000 milliLiter(s) (50 mL/Hr) IV Continuous <Continuous>    MEDICATIONS  (PRN):  acetaminophen     Tablet .. 650 milliGRAM(s) Oral every 6 hours PRN Temp greater or equal to 38C (100.4F), Mild Pain (1 - 3)  guaifenesin/dextromethorphan Oral Liquid 10 milliLiter(s) Oral every 4 hours PRN Cough  ondansetron Injectable 4 milliGRAM(s) IV Push every 8 hours PRN Nausea and/or Vomiting      PHYSICAL EXAM:  Vital Signs Last 24 Hrs  T(C): 36.4 (20 Nov 2022 12:04), Max: 36.7 (20 Nov 2022 06:22)  T(F): 97.5 (20 Nov 2022 12:04), Max: 98 (20 Nov 2022 06:22)  HR: 71 (20 Nov 2022 12:04) (60 - 73)  BP: 93/60 (20 Nov 2022 12:04) (93/60 - 129/64)  BP(mean): --  RR: 18 (20 Nov 2022 12:04) (17 - 18)  SpO2: 97% (20 Nov 2022 12:04) (96% - 99%)    Parameters below as of 20 Nov 2022 12:04  Patient On (Oxygen Delivery Method): nasal cannula      GENERAL: Frail appearing elderly woman, in NAD  EYES: EOMI, conjunctiva and sclera clear  ENT: moist mucosa, no pharyngeal erythema  NECK: supple, no JVD  LUNG: Clear to auscultation bilaterally; No rales, rhonchi, wheezing, or rubs.   CVS: Regular rate and rhythm; No murmurs, rubs, or gallops  ABDOMEN: Soft, non tender, nondistended. +Bowel sounds.  EXTREMITIES:  no edema, no cyanosis  NEURO:  Alert & Oriented X3,  No focal deficits  PSYCH: Normal affect, normal mood  MUSCULOSKELETAL: FROM, no joint swelling  Skin: L breast with intertrigo, skin breakdown, tender    LABS: reviewed                          11-20    x   |  x   |  x   ----------------------------<  x   x    |  x   |  0.49<L>    Ca    8.6      18 Nov 2022 16:37    TPro  5.2<L>  /  Alb  2.7<L>  /  TBili  0.6  /  DBili  0.2  /  AST  29  /  ALT  52<H>  /  AlkPhos  63  11-20                  PT/INR - ( 20 Nov 2022 07:04 )   PT: 17.2 sec;   INR: 1.48 ratio                   CAPILLARY BLOOD GLUCOSE

## 2022-11-20 NOTE — SWALLOW BEDSIDE ASSESSMENT ADULT - SLP PERTINENT HISTORY OF CURRENT PROBLEM
89yo F w/ PMH of TIA, tachycarida, cardiac aneurysm, multiple falls and depression pw c/f COVID19. States family member in the home w/ COVID in the home last week. on day prior to presentation pt developed cough w/ decreased PO intake and significant fatigue, unable to ambulate w/ walker. Pt admitted with COVID and UTI. Rx: Remdesivir 3 day course, Lovenox 40 mg SQ daily for DVT ppx 89yo F w/ PMH of TIA, tachycardia, cardiac aneurysm, multiple falls and depression pw c/f COVID19. States family member in the home w/ COVID in the home last week. on day prior to presentation pt developed cough w/ decreased PO intake and significant fatigue, unable to ambulate w/ walker. Pt admitted with COVID and UTI. Rx: Remdesivir 3 day course, Lovenox 40 mg SQ daily for DVT ppx

## 2022-11-20 NOTE — SWALLOW BEDSIDE ASSESSMENT ADULT - ASPIRATION PRECAUTIONS
Chief Complaint(s) and History of Present Illness(es)     COMPREHENSIVE EYE EXAM     Laterality: both eyes    Associated symptoms: Negative for eye pain, redness and discharge              Comments     Ben is here with his mother for an initial eye exam to rule out refractive error and/or disease in both eyes. No history of eye problems in the past and no concerns at this time. No redness or discharge noticed. No strabismus/AHP. Patient has autism.                    
Monitor for s/s aspiration/laryngeal penetration. If noted:  D/C p.o. intake, provide non-oral nutrition/hydration/meds, and contact this service @ [phone]

## 2022-11-20 NOTE — PROGRESS NOTE ADULT - CONVERSATION DETAILS
Pt's daughter originally made her DNR few days ago when pt was having a respiratory event. On 11/19, now that pt mental status is at baseline, revisited conversation with pt and daughter at bedside. Pt expressed that she would like to be full code and wishes for aggressive life saving measures.
Pt is DNR but wants trial of intubation

## 2022-11-20 NOTE — SWALLOW BEDSIDE ASSESSMENT ADULT - SWALLOW EVAL: PATIENT/FAMILY GOALS STATEMENT
To eat and drink; pt's daughter reports pt with some cough post-swallow with liquids at home. Patient eats softer food due to incomplete dentition.

## 2022-11-20 NOTE — SWALLOW BEDSIDE ASSESSMENT ADULT - SWALLOW EVAL: DIAGNOSIS
Pt presents with clinical signs of an oropharyngeal dysphagia, with overt signs of aspiration with thin liquids. 89 y/o female admitted with COVID, hospital course complicated by RRT on 11/18 for unresponsiveness s/p aspiration of gastric contents. Pt presents with clinical signs of an oropharyngeal dysphagia in presence of advanced age and deconditioned state. Swallow function notable for slow yet functional mastication of soft solids, suspected premature spillage over the base of tongue, x2-3 swallows per bolus suggestive of pharyngeal residue, and episode of prolonged congested cough post-swallow with thin liquids, concerning for laryngeal penetration/aspiration.

## 2022-11-20 NOTE — PROGRESS NOTE ADULT - NSPROGADDITIONALINFOA_GEN_ALL_CORE
D/W pt's daughter Marjan at bedside.  Start DC planning to [facility] Division of Hospital Medicine  Judy Berg MD  Pager (M-F, 8A-5P): [phone]  Other Times:  [phone]

## 2022-11-20 NOTE — SWALLOW BEDSIDE ASSESSMENT ADULT - PHARYNGEAL PHASE
suspected premature spillage over the base of tongue/Cough post oral intake/Delayed cough post oral intake/Complaints of pharyngeal stasis/Multiple swallows Multiple swallows

## 2022-11-20 NOTE — PROGRESS NOTE ADULT - PROBLEM SELECTOR PLAN 2
Weaned off O2, now satting well on RA   S/p remdesivir X 5 days;  cont Decadron x 10 days  Supportive care  Isolation precautions

## 2022-11-21 ENCOUNTER — TRANSCRIPTION ENCOUNTER (OUTPATIENT)
Age: 87
End: 2022-11-21

## 2022-11-21 VITALS
DIASTOLIC BLOOD PRESSURE: 66 MMHG | RESPIRATION RATE: 18 BRPM | SYSTOLIC BLOOD PRESSURE: 110 MMHG | HEART RATE: 61 BPM | OXYGEN SATURATION: 100 % | TEMPERATURE: 97 F

## 2022-11-21 LAB
ANION GAP SERPL CALC-SCNC: 13 MMOL/L — SIGNIFICANT CHANGE UP (ref 5–17)
BUN SERPL-MCNC: 25 MG/DL — HIGH (ref 7–23)
CALCIUM SERPL-MCNC: 8.8 MG/DL — SIGNIFICANT CHANGE UP (ref 8.4–10.5)
CHLORIDE SERPL-SCNC: 101 MMOL/L — SIGNIFICANT CHANGE UP (ref 96–108)
CO2 SERPL-SCNC: 22 MMOL/L — SIGNIFICANT CHANGE UP (ref 22–31)
CREAT SERPL-MCNC: 0.51 MG/DL — SIGNIFICANT CHANGE UP (ref 0.5–1.3)
EGFR: 88 ML/MIN/1.73M2 — SIGNIFICANT CHANGE UP
GLUCOSE SERPL-MCNC: 105 MG/DL — HIGH (ref 70–99)
HCT VFR BLD CALC: 41.2 % — SIGNIFICANT CHANGE UP (ref 34.5–45)
HGB BLD-MCNC: 13.3 G/DL — SIGNIFICANT CHANGE UP (ref 11.5–15.5)
MAGNESIUM SERPL-MCNC: 2.1 MG/DL — SIGNIFICANT CHANGE UP (ref 1.6–2.6)
MCHC RBC-ENTMCNC: 30.7 PG — SIGNIFICANT CHANGE UP (ref 27–34)
MCHC RBC-ENTMCNC: 32.3 GM/DL — SIGNIFICANT CHANGE UP (ref 32–36)
MCV RBC AUTO: 95.2 FL — SIGNIFICANT CHANGE UP (ref 80–100)
NRBC # BLD: 0 /100 WBCS — SIGNIFICANT CHANGE UP (ref 0–0)
PLATELET # BLD AUTO: 291 K/UL — SIGNIFICANT CHANGE UP (ref 150–400)
POTASSIUM SERPL-MCNC: 4.8 MMOL/L — SIGNIFICANT CHANGE UP (ref 3.5–5.3)
POTASSIUM SERPL-SCNC: 4.8 MMOL/L — SIGNIFICANT CHANGE UP (ref 3.5–5.3)
RBC # BLD: 4.33 M/UL — SIGNIFICANT CHANGE UP (ref 3.8–5.2)
RBC # FLD: 13.1 % — SIGNIFICANT CHANGE UP (ref 10.3–14.5)
SODIUM SERPL-SCNC: 136 MMOL/L — SIGNIFICANT CHANGE UP (ref 135–145)
WBC # BLD: 11.67 K/UL — HIGH (ref 3.8–10.5)
WBC # FLD AUTO: 11.67 K/UL — HIGH (ref 3.8–10.5)

## 2022-11-21 PROCEDURE — 85379 FIBRIN DEGRADATION QUANT: CPT

## 2022-11-21 PROCEDURE — 87086 URINE CULTURE/COLONY COUNT: CPT

## 2022-11-21 PROCEDURE — 82947 ASSAY GLUCOSE BLOOD QUANT: CPT

## 2022-11-21 PROCEDURE — 85362 FIBRIN DEGRADATION PRODUCTS: CPT

## 2022-11-21 PROCEDURE — 92610 EVALUATE SWALLOWING FUNCTION: CPT

## 2022-11-21 PROCEDURE — 97116 GAIT TRAINING THERAPY: CPT

## 2022-11-21 PROCEDURE — 80053 COMPREHEN METABOLIC PANEL: CPT

## 2022-11-21 PROCEDURE — 84132 ASSAY OF SERUM POTASSIUM: CPT

## 2022-11-21 PROCEDURE — 83605 ASSAY OF LACTIC ACID: CPT

## 2022-11-21 PROCEDURE — 87040 BLOOD CULTURE FOR BACTERIA: CPT

## 2022-11-21 PROCEDURE — 96360 HYDRATION IV INFUSION INIT: CPT

## 2022-11-21 PROCEDURE — 84295 ASSAY OF SERUM SODIUM: CPT

## 2022-11-21 PROCEDURE — 93306 TTE W/DOPPLER COMPLETE: CPT

## 2022-11-21 PROCEDURE — 85025 COMPLETE CBC W/AUTO DIFF WBC: CPT

## 2022-11-21 PROCEDURE — 85730 THROMBOPLASTIN TIME PARTIAL: CPT

## 2022-11-21 PROCEDURE — 85014 HEMATOCRIT: CPT

## 2022-11-21 PROCEDURE — U0005: CPT

## 2022-11-21 PROCEDURE — 83036 HEMOGLOBIN GLYCOSYLATED A1C: CPT

## 2022-11-21 PROCEDURE — 82962 GLUCOSE BLOOD TEST: CPT

## 2022-11-21 PROCEDURE — 81001 URINALYSIS AUTO W/SCOPE: CPT

## 2022-11-21 PROCEDURE — 80048 BASIC METABOLIC PNL TOTAL CA: CPT

## 2022-11-21 PROCEDURE — 99285 EMERGENCY DEPT VISIT HI MDM: CPT

## 2022-11-21 PROCEDURE — 85610 PROTHROMBIN TIME: CPT

## 2022-11-21 PROCEDURE — 85027 COMPLETE CBC AUTOMATED: CPT

## 2022-11-21 PROCEDURE — U0003: CPT

## 2022-11-21 PROCEDURE — 83735 ASSAY OF MAGNESIUM: CPT

## 2022-11-21 PROCEDURE — 93005 ELECTROCARDIOGRAM TRACING: CPT

## 2022-11-21 PROCEDURE — 82330 ASSAY OF CALCIUM: CPT

## 2022-11-21 PROCEDURE — 97161 PT EVAL LOW COMPLEX 20 MIN: CPT

## 2022-11-21 PROCEDURE — 85018 HEMOGLOBIN: CPT

## 2022-11-21 PROCEDURE — 84100 ASSAY OF PHOSPHORUS: CPT

## 2022-11-21 PROCEDURE — 71045 X-RAY EXAM CHEST 1 VIEW: CPT

## 2022-11-21 PROCEDURE — 99239 HOSP IP/OBS DSCHRG MGMT >30: CPT

## 2022-11-21 PROCEDURE — 82803 BLOOD GASES ANY COMBINATION: CPT

## 2022-11-21 PROCEDURE — 97530 THERAPEUTIC ACTIVITIES: CPT

## 2022-11-21 PROCEDURE — 87186 SC STD MICRODIL/AGAR DIL: CPT

## 2022-11-21 PROCEDURE — 82435 ASSAY OF BLOOD CHLORIDE: CPT

## 2022-11-21 PROCEDURE — 96361 HYDRATE IV INFUSION ADD-ON: CPT

## 2022-11-21 PROCEDURE — 97110 THERAPEUTIC EXERCISES: CPT

## 2022-11-21 PROCEDURE — 82728 ASSAY OF FERRITIN: CPT

## 2022-11-21 PROCEDURE — 82565 ASSAY OF CREATININE: CPT

## 2022-11-21 PROCEDURE — 94640 AIRWAY INHALATION TREATMENT: CPT

## 2022-11-21 PROCEDURE — 36415 COLL VENOUS BLD VENIPUNCTURE: CPT

## 2022-11-21 PROCEDURE — 80076 HEPATIC FUNCTION PANEL: CPT

## 2022-11-21 PROCEDURE — 86140 C-REACTIVE PROTEIN: CPT

## 2022-11-21 PROCEDURE — 99221 1ST HOSP IP/OBS SF/LOW 40: CPT

## 2022-11-21 PROCEDURE — 85384 FIBRINOGEN ACTIVITY: CPT

## 2022-11-21 RX ORDER — CLOTRIMAZOLE AND BETAMETHASONE DIPROPIONATE 10; .5 MG/G; MG/G
1 CREAM TOPICAL
Qty: 0 | Refills: 0 | DISCHARGE
Start: 2022-11-21

## 2022-11-21 RX ORDER — METOPROLOL TARTRATE 50 MG
1 TABLET ORAL
Qty: 0 | Refills: 0 | DISCHARGE
Start: 2022-11-21

## 2022-11-21 RX ORDER — METOPROLOL TARTRATE 50 MG
0.5 TABLET ORAL
Qty: 0 | Refills: 0 | DISCHARGE

## 2022-11-21 RX ORDER — CLOTRIMAZOLE AND BETAMETHASONE DIPROPIONATE 10; .5 MG/G; MG/G
1 CREAM TOPICAL
Refills: 0 | Status: DISCONTINUED | OUTPATIENT
Start: 2022-11-21 | End: 2022-11-21

## 2022-11-21 RX ORDER — APIXABAN 2.5 MG/1
1 TABLET, FILM COATED ORAL
Qty: 0 | Refills: 0 | DISCHARGE
Start: 2022-11-21

## 2022-11-21 RX ADMIN — Medication 975 MILLIGRAM(S): at 12:37

## 2022-11-21 RX ADMIN — APIXABAN 5 MILLIGRAM(S): 2.5 TABLET, FILM COATED ORAL at 09:27

## 2022-11-21 RX ADMIN — Medication 50 MILLIGRAM(S): at 05:46

## 2022-11-21 RX ADMIN — NYSTATIN CREAM 1 APPLICATION(S): 100000 CREAM TOPICAL at 05:45

## 2022-11-21 RX ADMIN — CHLORHEXIDINE GLUCONATE 1 APPLICATION(S): 213 SOLUTION TOPICAL at 05:44

## 2022-11-21 RX ADMIN — Medication 6 MILLIGRAM(S): at 05:46

## 2022-11-21 NOTE — PROGRESS NOTE ADULT - PROBLEM SELECTOR PLAN 1
Pt aspirated food contents 11/19; labwork during event with elevated lactate, WBC, LFTs and acidosis likely stress reaction, pt is now stable  CXR during event without pneumonia or debris in airway   wean off oxygen as tolerated

## 2022-11-21 NOTE — DISCHARGE NOTE NURSING/CASE MANAGEMENT/SOCIAL WORK - NSDCPEFALRISK_GEN_ALL_CORE
For information on Fall & Injury Prevention, visit: [email or web address] OR  [email or web address] OR  https://www.cdc.gov/steadi/patient.html

## 2022-11-21 NOTE — DISCHARGE NOTE NURSING/CASE MANAGEMENT/SOCIAL WORK - NSDCVIVACCINE_GEN_ALL_CORE_FT
Tdap; 15-Manpreet-2021 23:24; Tata Aleman); [employer]; [id number]   (Exp. Date: 11-Sep-2022); IntraMuscular; Deltoid Right.; 0.5 milliLiter(s); VIS (VIS Published: 09-May-2013, VIS Presented: 15-Manpreet-2021);

## 2022-11-21 NOTE — DISCHARGE NOTE NURSING/CASE MANAGEMENT/SOCIAL WORK - PATIENT PORTAL LINK FT
You can access the FollowMyHealth Patient Portal offered by [facility] by registering at the following website: [email or web address]. By joining [facility]’s FollowMyHealth portal, you will also be able to view your health information using other applications (apps) compatible with our system.

## 2022-11-21 NOTE — CONSULT NOTE ADULT - ASSESSMENT
Impression:    Bilateral inframammary skin fold intertrigo  fungal rash  pressure injury prophylaxis    Recommend:  1.) topical therapy: sacral/buttock injury - cleanse with incontinence cleanser, pat dry, apply Triad ointment BID and PRN for incontinent episodes  2.) Incontinence Management - incontinence cleanser, pads, pericare BID  3.) Maintain on an alternating air with low air loss surface  4.) Turn and reposition Q 2 hours  5.) Nutrition optimization  6.) Offload heels/feet with complete cair air fluidized boots; ensure that the soles of the feet are not resting on the foot board of the bed.  7.) Bilateral inframammary skin folds - wash with soap and water, pat dry, apply lotrisone cream BID x 14 days    Care as per medicine. Will not actively follow but will remain available. Please recall for new issues or deterioration.  Upon [facility] [address] [place] [address] [phone]  Thank you for this consult  Discussed with clinical nurse  Olive Grewal, KYA-C, CWOCN [id number]
89F w/ PMH of TIA, tachycardia cardiac aneurysm, multiple falls and depression presenting with UTI and covid-19. S/p course of CTX and remdesivir respectively, remains on dexamethasone to complete 10d course. Possible loss of pulse, agonal breathing this AM so Code Blue activated. Patient opened eyes within ~5s of CPR initiation. AF on monitor. Nasotracheally suction with gross return of thick gastric like contents. SpO2 100% on NRB. MICU consulted for evaluation.    #AHRF  Acutely iso aspiration event  Currently stable without agonal breathing  CXR reviewed, stable from prior  - c/w airway clearance  - Not a MICU candidate at this time. Please reconsult as needed.    Anaya Flores  IM PGY-2

## 2022-11-21 NOTE — PROGRESS NOTE ADULT - SUBJECTIVE AND OBJECTIVE BOX
Andre Reyes, M.D.  Pager: [phone]  Office: [phone]    Patient is a 89y old  Female who presents with a chief complaint of COVID (20 Nov 2022 15:06)          SUBJECTIVE / OVERNIGHT EVENTS:    No acute overnight events.    ROS: ( - ) Fever, ( - )Chills,  ( - )Nausea/Vomiting, ( - ) Cough, ( - )Shortness of breath, ( - )Chest Pain    MEDICATIONS  (STANDING):  acetaminophen     Tablet .. 975 milliGRAM(s) Oral every 6 hours  apixaban 5 milliGRAM(s) Oral every 12 hours  chlorhexidine 2% Cloths 1 Application(s) Topical daily  citalopram 20 milliGRAM(s) Oral daily  influenza  Vaccine (HIGH DOSE) 0.7 milliLiter(s) IntraMuscular once  metoprolol succinate ER 50 milliGRAM(s) Oral daily  nystatin Powder 1 Application(s) Topical two times a day  sodium chloride 0.9%. 1000 milliLiter(s) (50 mL/Hr) IV Continuous <Continuous>    MEDICATIONS  (PRN):  guaifenesin/dextromethorphan Oral Liquid 10 milliLiter(s) Oral every 4 hours PRN Cough  ondansetron Injectable 4 milliGRAM(s) IV Push every 8 hours PRN Nausea and/or Vomiting          T(C): 36.3 (11-21 @ 12:26), Max: 36.6 (11-21 @ 01:55)   HR: 61   BP: 110/66   RR: 18   SpO2: 100%    PHYSICAL EXAM:    CONSTITUTIONAL: NAD, well-developed, well-groomed  EYES: PERRLA; conjunctiva and sclera clear  ENMT: Moist oral mucosa, no pharyngeal injection or exudates; normal dentition  NECK: Supple, no palpable masses; no thyromegaly  RESPIRATORY: Normal respiratory effort; lungs are clear to auscultation bilaterally  CARDIOVASCULAR: Regular rate and rhythm, normal S1 and S2, no murmur/rub/gallop; No lower extremity edema; Peripheral pulses are 2+ bilaterally  ABDOMEN: Nontender to palpation, normoactive bowel sounds, no rebound/guarding; No hepatosplenomegaly  MUSCULOSKELETAL:  no clubbing or cyanosis of digits; no joint swelling or tenderness to palpation  PSYCH: A+O to person, place, and time; affect appropriate  NEUROLOGY: CN 2-12 are intact and symmetric; no gross sensory deficits   SKIN: No rashes; no palpable lesions      LABS:                        13.3   11.67 )-----------( 291      ( 21 Nov 2022 06:33 )             41.2      11-21    136  |  101  |  25<H>  ----------------------------<  105<H>  4.8   |  22  |  0.51    Ca    8.8      21 Nov 2022 06:34  Mg     2.1     11-21    TPro  5.2<L>  /  Alb  2.7<L>  /  TBili  0.6  /  DBili  0.2  /  AST  29  /  ALT  52<H>  /  AlkPhos  63  11-20       CAPILLARY BLOOD GLUCOSE          RADIOLOGY & ADDITIONAL TESTS:    Imaging Personally Reviewed:  Consultant(s) Notes Reviewed:    Care Discussed with Consultants/Other Providers:   Andre Reyes, M.D.  Pager: [phone]  Office: [phone]    Patient is a 89y old  Female who presents with a chief complaint of COVID (20 Nov 2022 15:06)          SUBJECTIVE / OVERNIGHT EVENTS:    No acute overnight events.  pt is excited to go.    ROS: ( - ) Fever, ( - )Chills,  ( - )Nausea/Vomiting, ( - ) Cough, ( - )Shortness of breath, ( - )Chest Pain    MEDICATIONS  (STANDING):  acetaminophen     Tablet .. 975 milliGRAM(s) Oral every 6 hours  apixaban 5 milliGRAM(s) Oral every 12 hours  chlorhexidine 2% Cloths 1 Application(s) Topical daily  citalopram 20 milliGRAM(s) Oral daily  influenza  Vaccine (HIGH DOSE) 0.7 milliLiter(s) IntraMuscular once  metoprolol succinate ER 50 milliGRAM(s) Oral daily  nystatin Powder 1 Application(s) Topical two times a day  sodium chloride 0.9%. 1000 milliLiter(s) (50 mL/Hr) IV Continuous <Continuous>    MEDICATIONS  (PRN):  guaifenesin/dextromethorphan Oral Liquid 10 milliLiter(s) Oral every 4 hours PRN Cough  ondansetron Injectable 4 milliGRAM(s) IV Push every 8 hours PRN Nausea and/or Vomiting          T(C): 36.3 (11-21 @ 12:26), Max: 36.6 (11-21 @ 01:55)   HR: 61   BP: 110/66   RR: 18   SpO2: 100%    PHYSICAL EXAM:    CONSTITUTIONAL: NAD, well-developed, well-groomed  EYES: PERRLA; conjunctiva and sclera clear  ENMT: Moist oral mucosa, no pharyngeal injection or exudates; normal dentition  NECK: Supple, no palpable masses; no thyromegaly  RESPIRATORY: Normal respiratory effort; lungs are clear to auscultation bilaterally  CARDIOVASCULAR: Regular rate and rhythm, normal S1 and S2, no murmur/rub/gallop; No lower extremity edema; Peripheral pulses are 2+ bilaterally  ABDOMEN: Nontender to palpation, normoactive bowel sounds, no rebound/guarding; No hepatosplenomegaly  MUSCULOSKELETAL:  no clubbing or cyanosis of digits; no joint swelling or tenderness to palpation  PSYCH: A+O to person, place, and time; affect appropriate  NEUROLOGY: CN 2-12 are intact and symmetric; no gross sensory deficits   SKIN: rash under left breast      LABS:                        13.3   11.67 )-----------( 291      ( 21 Nov 2022 06:33 )             41.2      11-21    136  |  101  |  25<H>  ----------------------------<  105<H>  4.8   |  22  |  0.51    Ca    8.8      21 Nov 2022 06:34  Mg     2.1     11-21    TPro  5.2<L>  /  Alb  2.7<L>  /  TBili  0.6  /  DBili  0.2  /  AST  29  /  ALT  52<H>  /  AlkPhos  63  11-20       CAPILLARY BLOOD GLUCOSE          RADIOLOGY & ADDITIONAL TESTS:    Imaging Personally Reviewed:  Consultant(s) Notes Reviewed:    Care Discussed with Consultants/Other Providers:

## 2022-11-21 NOTE — PROGRESS NOTE ADULT - PROBLEM SELECTOR PLAN 8
[facility] requires 10 days of isolation prior to accepting pt
[facility] requires 10 days of isolation prior to accepting pt
stable for discharge today.  Discharge time spent: 34 min
[facility] requires 10 days of isolation prior to accepting pt, earliest saturday 11/20

## 2022-11-21 NOTE — CONSULT NOTE ADULT - SUBJECTIVE AND OBJECTIVE BOX
Wound Surgery Consult Note:    HPI:  Pt is an 88yo F w/ PMH of TIA, tachycarida, cardiac aneurysm, multiple falls and depression pw c/f COVID19    States family member in the home w/ COVID in the home last week. on day prior to presentation pt developed cough w/ decreased PO intake and significant fatigue, unable to ambulate w/ walker.     She otherwise denies any CP, SOB, palpitations, abd pain, N/V, constipation, or diarrhea.    In the ED she was administered 500cc IVF.   (08 Nov 2022 23:22)    Request for wound care consult for painful rash under her bilateral breasts received from nursing. Skin tear noted after compressions. Will treat empirically for candidiasis.    PAST MEDICAL & SURGICAL HISTORY:  Bladder disorder, dropped bladder  Palpitation  Depression  History of sinus tachycardia  TIA (transient ischemic attack)  Multiple falls  History of cardiac aneurysm  H/O hernia repair    REVIEW OF SYSTEMS  SOCIAL HISTORY:  patient is unable to offer due to mental status    MEDICATIONS  (STANDING):  acetaminophen     Tablet .. 975 milliGRAM(s) Oral every 6 hours  apixaban 5 milliGRAM(s) Oral every 12 hours  chlorhexidine 2% Cloths 1 Application(s) Topical daily  citalopram 20 milliGRAM(s) Oral daily  influenza  Vaccine (HIGH DOSE) 0.7 milliLiter(s) IntraMuscular once  metoprolol succinate ER 50 milliGRAM(s) Oral daily  nystatin Powder 1 Application(s) Topical two times a day  sodium chloride 0.9%. 1000 milliLiter(s) (50 mL/Hr) IV Continuous <Continuous>    MEDICATIONS  (PRN):  guaifenesin/dextromethorphan Oral Liquid 10 milliLiter(s) Oral every 4 hours PRN Cough  ondansetron Injectable 4 milliGRAM(s) IV Push every 8 hours PRN Nausea and/or Vomiting    Allergies    No Known Allergies    Intolerances    SOCIAL HISTORY:  patient is unable to offer due to mental status    FAMILY HISTORY:  FH: heart disease (Sibling)    Vital Signs Last 24 Hrs  T(C): 36.3 (21 Nov 2022 12:26), Max: 36.6 (21 Nov 2022 01:55)  T(F): 97.4 (21 Nov 2022 12:26), Max: 97.9 (21 Nov 2022 01:55)  HR: 61 (21 Nov 2022 12:26) (61 - 94)  BP: 110/66 (21 Nov 2022 12:26) (110/66 - 155/78)  BP(mean): --  RR: 18 (21 Nov 2022 12:26) (15 - 18)  SpO2: 100% (21 Nov 2022 12:26) (99% - 100%)    Parameters below as of 21 Nov 2022 12:26  Patient On (Oxygen Delivery Method): nasal cannula    Physical Exam:  General: Obtunded, thin  Ophthamology: sclera clear  ENMT: moist mucous membranes, trachea midline  Respiratory: equal chest rise with respirations  Gastrointestinal: soft NT/ND  Neurology: nonverbal, not following commands  Psych: calm  Musculoskeletal: no contractures  Vascular: BLE edema equal  Skin:  bilateral under breast inframammary skin folds with erythema, weeping, denuded skin, pink wound bed, no necrotic tissue, and scant drainage, +TTP  No odor, increased warmth, induration, fluctuance      LABS:  11-21    136  |  101  |  25<H>  ----------------------------<  105<H>  4.8   |  22  |  0.51    Ca    8.8      21 Nov 2022 06:34  Mg     2.1     11-21    TPro  5.2<L>  /  Alb  2.7<L>  /  TBili  0.6  /  DBili  0.2  /  AST  29  /  ALT  52<H>  /  AlkPhos  63  11-20                          13.3   11.67 )-----------( 291      ( 21 Nov 2022 06:33 )             41.2     PT/INR - ( 20 Nov 2022 07:04 )   PT: 17.2 sec;   INR: 1.48 ratio           
MICU Consult Note    HPI:  89F w/ PMH of TIA, tachycardia cardiac aneurysm, multiple falls and depression presenting with UTI and covid-19. S/p course of CTX and remdesivir respectively, remains on dexamethasone to complete 10d course. Possible loss of pulse, agonal breathing this AM so Code Blue activated. Patient opened eyes within ~5s of CPR initiation. AF on monitor. Nasotracheally suction with gross return of thick gastric like contents. SpO2 100% on NRB. Respiratory distress improved with airway clearance. MICU consulted for evaluation.    PAST MEDICAL & SURGICAL HISTORY:  Bladder disorder  dropped bladder      Palpitation      Depression      History of sinus tachycardia      TIA (transient ischemic attack)      Multiple falls      History of cardiac aneurysm      H/O hernia repair          FAMILY HISTORY:  FH: heart disease (Sibling)        Advance Directives:    Allergies    No Known Allergies    Intolerances          OBJECTIVE:  ICU Vital Signs Last 24 Hrs  T(C): 36.2 (18 Nov 2022 12:00), Max: 36.6 (17 Nov 2022 20:50)  T(F): 97.2 (18 Nov 2022 12:00), Max: 97.9 (17 Nov 2022 20:50)  HR: 65 (18 Nov 2022 12:00) (60 - 65)  BP: 101/64 (18 Nov 2022 12:00) (101/64 - 116/73)  BP(mean): --  ABP: --  ABP(mean): --  RR: 17 (18 Nov 2022 12:00) (17 - 18)  SpO2: 96% (18 Nov 2022 12:00) (95% - 100%)    O2 Parameters below as of 18 Nov 2022 12:00  Patient On (Oxygen Delivery Method): nasal cannula  O2 Flow (L/min): 3            11-17 @ 07:01  -  11-18 @ 07:00  --------------------------------------------------------  IN: 780 mL / OUT: 1600 mL / NET: -820 mL      CAPILLARY BLOOD GLUCOSE      POCT Blood Glucose.: 121 mg/dL (18 Nov 2022 09:01)      PHYSICAL EXAM:  Gen: no acute distress  HEENT: atraumatic, normocephalic, EOMI  CV: RRR no murmurs  Resp: scattered rhonchi diffusely, no wheezing, on NRB  GI: soft, nontender, nondistended, BS+  MSK: extremities atraumatic, no cyanosis or clubbing  Skin: warm, dry, no rashes or lesions  Neuro: no focal deficits, sensation grossly intact  Psych: alert and oriented x3, appropriate mood and affect        HOSPITAL MEDICATIONS:  MEDICATIONS  (STANDING):  apixaban 5 milliGRAM(s) Oral every 12 hours  chlorhexidine 2% Cloths 1 Application(s) Topical daily  citalopram 20 milliGRAM(s) Oral daily  dexAMETHasone     Tablet 6 milliGRAM(s) Oral daily  influenza  Vaccine (HIGH DOSE) 0.7 milliLiter(s) IntraMuscular once  metoprolol succinate ER 50 milliGRAM(s) Oral daily  nystatin Powder 1 Application(s) Topical two times a day  sodium chloride 0.9%. 1000 milliLiter(s) (50 mL/Hr) IV Continuous <Continuous>    MEDICATIONS  (PRN):  acetaminophen     Tablet .. 650 milliGRAM(s) Oral every 6 hours PRN Temp greater or equal to 38C (100.4F), Mild Pain (1 - 3)  guaifenesin/dextromethorphan Oral Liquid 10 milliLiter(s) Oral every 4 hours PRN Cough  ondansetron Injectable 4 milliGRAM(s) IV Push every 8 hours PRN Nausea and/or Vomiting      LABS:                        14.0   16.90 )-----------( 404      ( 18 Nov 2022 10:24 )             43.0     11-18    136  |  97  |  20  ----------------------------<  230<H>  4.1   |  20<L>  |  0.54    Ca    9.0      18 Nov 2022 10:24  Phos  6.1     11-18  Mg     2.3     11-18    TPro  6.1  /  Alb  3.2<L>  /  TBili  0.4  /  DBili  x   /  AST  121<H>  /  ALT  85<H>  /  AlkPhos  87  11-18    PT/INR - ( 18 Nov 2022 07:12 )   PT: 14.8 sec;   INR: 1.27 ratio             Arterial Blood Gas:  11-18 @ 10:19  7.30/46/210/23/99.5/-4.0  ABG lactate: --

## 2022-11-21 NOTE — PROGRESS NOTE ADULT - PROVIDER SPECIALTY LIST ADULT
Hospitalist
Internal Medicine
Hospitalist
Hospitalist
Internal Medicine
Internal Medicine
Hospitalist

## 2022-11-27 NOTE — H&P ADULT - PROBLEM SELECTOR PROBLEM 1
Awilda Shore was seen and treated in our emergency department on 11/27/2022  Diagnosis:     Kalli Mccarty  may return to work on return date  She may return on this date: 11/30/2022         If you have any questions or concerns, please don't hesitate to call        Douglas Mcclure PA-C    ______________________________           _______________          _______________  Hospital Representative                              Date                                Time 2019 novel coronavirus disease (COVID-19)

## 2022-12-01 ENCOUNTER — EMERGENCY (EMERGENCY)
Facility: HOSPITAL | Age: 87
LOS: 0 days | Discharge: HOME | End: 2022-12-02
Attending: EMERGENCY MEDICINE
Payer: MEDICARE

## 2022-12-01 VITALS
DIASTOLIC BLOOD PRESSURE: 69 MMHG | SYSTOLIC BLOOD PRESSURE: 131 MMHG | RESPIRATION RATE: 17 BRPM | HEART RATE: 72 BPM | OXYGEN SATURATION: 95 % | HEIGHT: 65 IN | TEMPERATURE: 98 F

## 2022-12-01 DIAGNOSIS — R07.89 OTHER CHEST PAIN: ICD-10-CM

## 2022-12-01 DIAGNOSIS — Z98.890 OTHER SPECIFIED POSTPROCEDURAL STATES: Chronic | ICD-10-CM

## 2022-12-01 DIAGNOSIS — I48.91 UNSPECIFIED ATRIAL FIBRILLATION: ICD-10-CM

## 2022-12-01 DIAGNOSIS — I25.3 ANEURYSM OF HEART: ICD-10-CM

## 2022-12-01 DIAGNOSIS — Z79.01 LONG TERM (CURRENT) USE OF ANTICOAGULANTS: ICD-10-CM

## 2022-12-01 DIAGNOSIS — Z86.73 PERSONAL HISTORY OF TRANSIENT ISCHEMIC ATTACK (TIA), AND CEREBRAL INFARCTION WITHOUT RESIDUAL DEFICITS: ICD-10-CM

## 2022-12-01 DIAGNOSIS — F32.A DEPRESSION, UNSPECIFIED: ICD-10-CM

## 2022-12-01 LAB
ALBUMIN SERPL ELPH-MCNC: 2 G/DL — LOW (ref 3.3–5)
ALP SERPL-CCNC: 116 U/L — SIGNIFICANT CHANGE UP (ref 40–120)
ALT FLD-CCNC: 21 U/L — SIGNIFICANT CHANGE UP (ref 12–78)
ANION GAP SERPL CALC-SCNC: 3 MMOL/L — LOW (ref 5–17)
AST SERPL-CCNC: 35 U/L — SIGNIFICANT CHANGE UP (ref 15–37)
BASOPHILS # BLD AUTO: 0.02 K/UL — SIGNIFICANT CHANGE UP (ref 0–0.2)
BASOPHILS NFR BLD AUTO: 0.3 % — SIGNIFICANT CHANGE UP (ref 0–2)
BILIRUB SERPL-MCNC: 0.3 MG/DL — SIGNIFICANT CHANGE UP (ref 0.2–1.2)
BUN SERPL-MCNC: 25 MG/DL — HIGH (ref 7–23)
CALCIUM SERPL-MCNC: 7.4 MG/DL — LOW (ref 8.5–10.1)
CHLORIDE SERPL-SCNC: 112 MMOL/L — HIGH (ref 96–108)
CO2 SERPL-SCNC: 26 MMOL/L — SIGNIFICANT CHANGE UP (ref 22–31)
CREAT SERPL-MCNC: 0.32 MG/DL — LOW (ref 0.5–1.3)
D DIMER BLD IA.RAPID-MCNC: 260 NG/ML DDU — HIGH
EGFR: 99 ML/MIN/1.73M2 — SIGNIFICANT CHANGE UP
EOSINOPHIL # BLD AUTO: 0.13 K/UL — SIGNIFICANT CHANGE UP (ref 0–0.5)
EOSINOPHIL NFR BLD AUTO: 1.9 % — SIGNIFICANT CHANGE UP (ref 0–6)
GLUCOSE SERPL-MCNC: 73 MG/DL — SIGNIFICANT CHANGE UP (ref 70–99)
HCT VFR BLD CALC: 33 % — LOW (ref 34.5–45)
HGB BLD-MCNC: 10.8 G/DL — LOW (ref 11.5–15.5)
IMM GRANULOCYTES NFR BLD AUTO: 0.1 % — SIGNIFICANT CHANGE UP (ref 0–0.9)
LYMPHOCYTES # BLD AUTO: 1.67 K/UL — SIGNIFICANT CHANGE UP (ref 1–3.3)
LYMPHOCYTES # BLD AUTO: 24.5 % — SIGNIFICANT CHANGE UP (ref 13–44)
MCHC RBC-ENTMCNC: 31.4 PG — SIGNIFICANT CHANGE UP (ref 27–34)
MCHC RBC-ENTMCNC: 32.7 GM/DL — SIGNIFICANT CHANGE UP (ref 32–36)
MCV RBC AUTO: 95.9 FL — SIGNIFICANT CHANGE UP (ref 80–100)
MONOCYTES # BLD AUTO: 0.68 K/UL — SIGNIFICANT CHANGE UP (ref 0–0.9)
MONOCYTES NFR BLD AUTO: 10 % — SIGNIFICANT CHANGE UP (ref 2–14)
NEUTROPHILS # BLD AUTO: 4.3 K/UL — SIGNIFICANT CHANGE UP (ref 1.8–7.4)
NEUTROPHILS NFR BLD AUTO: 63.2 % — SIGNIFICANT CHANGE UP (ref 43–77)
PLATELET # BLD AUTO: 204 K/UL — SIGNIFICANT CHANGE UP (ref 150–400)
POTASSIUM SERPL-MCNC: 4.4 MMOL/L — SIGNIFICANT CHANGE UP (ref 3.5–5.3)
POTASSIUM SERPL-SCNC: 4.4 MMOL/L — SIGNIFICANT CHANGE UP (ref 3.5–5.3)
PROT SERPL-MCNC: 5.1 GM/DL — LOW (ref 6–8.3)
RBC # BLD: 3.44 M/UL — LOW (ref 3.8–5.2)
RBC # FLD: 14.2 % — SIGNIFICANT CHANGE UP (ref 10.3–14.5)
SODIUM SERPL-SCNC: 141 MMOL/L — SIGNIFICANT CHANGE UP (ref 135–145)
TROPONIN I, HIGH SENSITIVITY RESULT: 4.62 NG/L — SIGNIFICANT CHANGE UP
WBC # BLD: 6.81 K/UL — SIGNIFICANT CHANGE UP (ref 3.8–10.5)
WBC # FLD AUTO: 6.81 K/UL — SIGNIFICANT CHANGE UP (ref 3.8–10.5)

## 2022-12-01 PROCEDURE — 71045 X-RAY EXAM CHEST 1 VIEW: CPT | Mod: 26

## 2022-12-01 PROCEDURE — 99285 EMERGENCY DEPT VISIT HI MDM: CPT

## 2022-12-01 PROCEDURE — 71275 CT ANGIOGRAPHY CHEST: CPT | Mod: 26,MA

## 2022-12-01 PROCEDURE — 85025 COMPLETE CBC W/AUTO DIFF WBC: CPT

## 2022-12-01 PROCEDURE — 93010 ELECTROCARDIOGRAM REPORT: CPT

## 2022-12-01 PROCEDURE — 99285 EMERGENCY DEPT VISIT HI MDM: CPT | Mod: 25

## 2022-12-01 PROCEDURE — 36415 COLL VENOUS BLD VENIPUNCTURE: CPT

## 2022-12-01 PROCEDURE — 93005 ELECTROCARDIOGRAM TRACING: CPT

## 2022-12-01 PROCEDURE — 85379 FIBRIN DEGRADATION QUANT: CPT

## 2022-12-01 PROCEDURE — 84484 ASSAY OF TROPONIN QUANT: CPT

## 2022-12-01 PROCEDURE — 71045 X-RAY EXAM CHEST 1 VIEW: CPT

## 2022-12-01 PROCEDURE — 80053 COMPREHEN METABOLIC PANEL: CPT

## 2022-12-01 PROCEDURE — 71275 CT ANGIOGRAPHY CHEST: CPT | Mod: MA

## 2022-12-01 NOTE — ED PROVIDER NOTE - CLINICAL SUMMARY MEDICAL DECISION MAKING FREE TEXT BOX
Workup negative in the department.  Asymptomatic on arrival.  Well on reevaluation.  DC home with follow up.

## 2022-12-01 NOTE — ED PROVIDER NOTE - NSFOLLOWUPINSTRUCTIONS_ED_ALL_ED_FT
Chest Pain    WHAT YOU NEED TO KNOW:    Chest pain can be caused by a range of conditions, from not serious to life-threatening. Chest pain can be a symptom of a digestive problem, such as acid reflux or a stomach ulcer. An anxiety attack or a strong emotion, such as anger, can also cause chest pain. Infection, inflammation, or a fracture in the bones or cartilage in your chest can cause pain or discomfort. Sometimes chest pain or pressure is caused by poor blood flow to your heart (angina). Chest pain may also be caused by life-threatening conditions such as a heart attack or blood clot in your lungs.     DISCHARGE INSTRUCTIONS:    Call 911 if:     You have any of the following signs of a heart attack:   Squeezing, pressure, or pain in your chest       and any of the following:   Discomfort or pain in your back, neck, jaw, stomach, or arm       Shortness of breath      Nausea or vomiting      Lightheadedness or a sudden cold sweat        Return to the emergency department if:     You have chest discomfort that gets worse, even with medicine.      You cough or vomit blood.       Your bowel movements are black or bloody.       You cannot stop vomiting, or it hurts to swallow.     Contact your healthcare provider if:     You have questions or concerns about your condition or care.        Medicines:     Medicines may be given to treat the cause of your chest pain. Examples include pain medicine, anxiety medicine, or medicines to increase blood flow to your heart.       Do not take certain medicines without asking your healthcare provider first. These include NSAIDs, herbal or vitamin supplements, or hormones (estrogen or progestin).       Take your medicine as directed. Contact your healthcare provider if you think your medicine is not helping or if you have side effects. Tell him or her if you are allergic to any medicine. Keep a list of the medicines, vitamins, and herbs you take. Include the amounts, and when and why you take them. Bring the list or the pill bottles to follow-up visits. Carry your medicine list with you in case of an emergency.    Follow up with your healthcare provider within 72 hours, or as directed: You may need to return for more tests to find the cause of your chest pain. You may be referred to a specialist, such as a cardiologist or gastroenterologist. Write down your questions so you remember to ask them during your visits.     Healthy living tips: The following are general healthy guidelines. If your chest pain is caused by a heart problem, your healthcare provider will give you specific guidelines to follow.    Do not smoke. Nicotine and other chemicals in cigarettes and cigars can cause lung and heart damage. Ask your healthcare provider for information if you currently smoke and need help to quit. E-cigarettes or smokeless tobacco still contain nicotine. Talk to your healthcare provider before you use these products.       Eat a variety of healthy, low-fat, low-salt foods. Healthy foods include fruits, vegetables, whole-grain breads, low-fat dairy products, beans, lean meats, and fish. Ask for more information about a heart healthy diet.      Drink plenty of water every day. Your body is made of mostly water. Water helps your body to control your temperature and blood pressure. Ask your healthcare provider how much water you should drink every day.      Ask about activity. Your healthcare provider will tell you which activities to limit or avoid. Ask when you can drive, return to work, and have sex. Ask about the best exercise plan for you.      Maintain a healthy weight. Ask your healthcare provider how much you should weigh. Ask him or her to help you create a weight loss plan if you are overweight.       Get the flu and pneumonia vaccines. All adults should get the influenza (flu) vaccine. Get it every year as soon as it becomes available. The pneumococcal vaccine is given to adults aged 65 years or older. The vaccine is given every 5 years to prevent pneumococcal disease, such as pneumonia.    If you have a stent:     Carry your stent card with you at all times.       Let all healthcare providers know that you have a stent.      [facility]                                                                                                             Spinal Compression Fracture       A spinal compression fracture is a collapse of the bones that form the spine (vertebrae). With this type of fracture, the vertebrae become pushed (compressed) into a wedge shape. Most compression fractures happen in the middle or lower part of the spine.      What are the causes?    This condition may be caused by:  •Thinning and loss of density in the bones (osteoporosis). This is the most common cause.      •A fall.      •A car or motorcycle accident.      •Cancer.      •Trauma, such as a heavy, direct hit to the head or back.        What increases the risk?    You are more likely to develop this condition if:  •You are 60 years of age or older.      •You have osteoporosis.    •You have certain types of cancer, including:  •Multiple myeloma.      •Lymphoma.      •Prostate cancer.      •Lung cancer.      •Breast cancer.          What are the signs or symptoms?    Symptoms of this condition include:  •Severe pain with simple movements such as coughing or sneezing.      •Pain that gets worse over time.      •Pain that is worse when you stand, walk, sit, or bend.      •Sudden pain that is so bad that it is hard for you to move.      •Bending or humping of the spine.      •Gradual loss of height.      •Numbness, tingling, or weakness in the back and legs.      •Trouble walking.      Your symptoms will depend on the cause of the fracture and how quickly it develops.      How is this diagnosed?    This condition may be diagnosed based on symptoms, medical history, and a physical exam. During the physical exam, your health care provider may tap along the length of your spine to check for tenderness. Tests may be done to confirm the diagnosis. They may include:  •A bone mineral density test to check for osteoporosis.      •Imaging tests, such as a spine X-ray, CT scan, or MRI.        How is this treated?    Treatment depends on the cause and severity of the condition. Some fractures may heal on their own with supportive care. Treatment may include:  •Pain medicine.      •Rest.      •A back brace.      •Physical therapy exercises.      •Medicine to strengthen bone.      •Calcium and vitamin D supplements.      Fractures that cause the back to become misshapen, cause nerve pain or weakness, or do not respond to other treatment may be treated with surgery. This may include:  •Vertebroplasty. Bone cement is injected into the collapsed vertebrae to stabilize them.      •Balloon kyphoplasty. The collapsed vertebrae are expanded with a balloon and then bone cement is injected into them.      •Spinal fusion. The collapsed vertebrae are connected (fused) to normal vertebrae.        Follow these instructions at home:    Medicines     •Take over-the-counter and prescription medicines only as told by your health care provider.    •Ask your health care provider if the medicine prescribed to you:   •Requires you to avoid driving or using machinery.     •Can cause constipation. You may need to take these actions to prevent or treat constipation:   •Drink enough fluid to keep your urine pale yellow.       •Take over-the-counter or prescription medicines.       •Eat foods that are high in fiber, such as beans, whole grains, and fresh fruits and vegetables.       •Limit foods that are high in fat and processed sugars, such as fried or sweet foods.          If you have a brace:     •Wear the brace as told by your health care provider. Remove it only as told by your health care provider.       • Loosen the brace if your fingers or toes tingle, become numb, or turn cold and blue.      •Keep the brace clean.    •If the brace is not waterproof:  •Do not let it get wet.      •Cover it with a watertight covering when you take a bath or a shower.          Managing pain, stiffness, and swelling    •If directed, put ice on the injured area. To do this:  •If you have a removable brace, remove it as told by your health care provider.      •Put ice in a plastic bag.      •Place a towel between your skin and the bag.      •Leave the ice on for 20 minutes, 2–3 times a day.      •Remove the ice if your skin turns bright red. This is very important. If you cannot feel pain, heat, or cold, you have a greater risk of damage to the area.        Activity     •Rest as told by your health care provider.       •Avoid sitting for a long time without moving. Get up to take short walks every 1–2 hours. This is important to improve blood flow and breathing. Ask for help if you feel weak or unsteady.      •Return to your normal activities as told by your health care provider. Ask what activities are safe for you.      •Do physical therapy exercises to improve movement and strength in your back, as recommended by your health care provider.      •Exercise regularly as directed by your health care provider.        General instructions      • Do not drink alcohol. Alcohol can interfere with your treatment.      • Do not use any products that contain nicotine or tobacco, such as cigarettes, e-cigarettes, and chewing tobacco. These can delay bone healing. If you need help quitting, ask your health care provider.      •Keep all follow-up visits. This is important. It can help to prevent permanent injury, disability, and long-lasting (chronic) pain.        Contact a health care provider if:    •You have a fever.      •Your pain medicine is not helping.      •Your pain does not get better over time.      •You cannot return to your normal activities as planned or expected.        Get help right away if:    •Your pain is very bad and it suddenly gets worse.      •You are unable to move any body part (paralysis) that is below the level of your injury.      •You have numbness, tingling, or weakness in any body part that is below the level of your injury.      •You cannot control your bladder or bowels.        Summary    •A spinal compression fracture is a collapse of the bones that form the spine (vertebrae).      •With this type of fracture, the vertebrae become pushed (compressed) into a wedge shape.      •Your symptoms and treatment will depend on the cause and severity of the fracture and how quickly it develops.      •Some fractures may heal on their own with supportive care. Fractures that cause the back to become misshapen, cause nerve pain or weakness, or do not respond to other treatment may be treated with surgery.      This information is not intended to replace advice given to you by your health care provider. Make sure you discuss any questions you have with your health care provider.      Document Revised: 04/07/2021 Document Reviewed: 04/07/2021    [employer] Patient Education © 2022 [employer]

## 2022-12-01 NOTE — ED PROVIDER NOTE - OBJECTIVE STATEMENT
89 y/o female PMHx of cardiac aneurysm, TIA, CVA, afib on Eliquis, and palpitations BIBEMS from NH for sharp left sided chest pain today from 6-6:30pm. Pt now reports pain has resolved. Daughter at bedside. Pt was given 324 of ASA en route to hospital.

## 2022-12-01 NOTE — ED PROVIDER NOTE - CARE PROVIDERS DIRECT ADDRESSES
,DirectAddress_Unknown,[email or web address]

## 2022-12-01 NOTE — ED ADULT TRIAGE NOTE - CHIEF COMPLAINT QUOTE
pt bibems from NH c/o L sided chest pain from 6-6:30pm. hx of afib, stroke. on eliquis. pt given 324 of ASA in route to hospital. pt sent for stat ekg

## 2022-12-01 NOTE — ED ADULT NURSE NOTE - OBJECTIVE STATEMENT
pt bibems from [facility] c/o left sided chest pain x30min. Hx of afib, stroke. On eliquis. Pt given 324 asa by ems. Upon assessment, no chest pain. No s/s of distress.

## 2022-12-01 NOTE — ED PROVIDER NOTE - PATIENT PORTAL LINK FT
You can access the FollowMyHealth Patient Portal offered by [facility] by registering at the following website: [email or web address]. By joining [employer]’s FollowMyHealth portal, you will also be able to view your health information using other applications (apps) compatible with our system.

## 2022-12-01 NOTE — ED PROVIDER NOTE - PROGRESS NOTE DETAILS
JG:  Received signout from Dr. Aranda to follow up CTA chest which was negative for PE.  No other acute abnormalities.  Possible worsening thoracic vertebral compression fracture but patient not currently with any pain at that location. Will f/u with PMD.  2nd trop negative, will have patient follow up with her doctor within 1-2 days.

## 2022-12-01 NOTE — ED ADULT NURSE NOTE - NSIMPLEMENTINTERV_GEN_ALL_ED
Implemented All Fall with Harm Risk Interventions:  [place] to call system. Call bell, personal items and telephone within reach. Instruct patient to call for assistance. Room bathroom lighting operational. Non-slip footwear when patient is off stretcher. Physically safe environment: no spills, clutter or unnecessary equipment. Stretcher in lowest position, wheels locked, appropriate side rails in place. Provide visual cue, wrist band, yellow gown, etc. Monitor gait and stability. Monitor for mental status changes and reorient to person, place, and time. Review medications for side effects contributing to fall risk. Reinforce activity limits and safety measures with patient and family. Provide visual clues: red socks.

## 2022-12-01 NOTE — ED PROVIDER NOTE - PHYSICAL EXAMINATION
Physical Exam:  Gen: NAD, non-toxic appearing, able to ambulate without assistance  Head: NCAT  HEENT: EOMI, PEERLA, normal conjunctiva, tongue midline, oral mucosa moist  Lung: CTAB, no respiratory distress, no wheezes/rhonchi/rales B/L, speaking in full sentences  CV: RRR, no murmurs, rubs or gallops, distal pulses 2+ b/l. No peripheral edema.  Abd: soft, nontender, no distention, no guarding, no rigidity, no rebound tenderness  MSK: no visible deformities, ROM normal in UE/LE  Skin: Warm, well perfused, no rash  Psych: normal affect, calm

## 2022-12-01 NOTE — ED ADULT NURSE NOTE - NS_NURSE_DISC_TEACHING_YN_ED_ALL_ED
----- Message from Chadd Lindo DO sent at 12/3/2019  1:04 PM CST -----  Please contact patient:  Radiology read agreed with my in no sign of fracture, dislocation or mass lesion/arthritic changes.  Make sure she gets her blood drawn today.  Thank you.  Follow-up as planned.   Yes

## 2022-12-01 NOTE — ED PROVIDER NOTE - CARE PROVIDER_API CALL
Sundar Owen  CARDIOVASCULAR DISEASE  [address] [facility], [address]  [place] [address]  Phone: [phone]  Fax: [phone]  Follow Up Time:     Emiliano Lopez (MD; PhD)  [address], [address]nd [place] [address]  Phone: [phone]  Fax: [phone]  Follow Up Time:

## 2022-12-01 NOTE — ED PROVIDER NOTE - NSICDXPASTMEDICALHX_GEN_ALL_CORE_FT
Office Visit - Follow up    Lynn Soriano   89 y.o. female    Date of Visit: 3/13/2018    Chief Complaint   Patient presents with     Back Pain     x 5 days       Subjective: Low back pain.  Prior history of lumbar compression fracture with anterior wedging of L1 vertebrae 2016.  Patient had no antecedent injury or trauma.  Using extra strength or arthritis strength Tylenol 650 mg tablets taking 2 3 times a day now still having pain.  No blood in stool or urine no chest pain or shortness of breath medication list reviewed well-tolerated.  Penicillin allergy.  No radiation of the pain to either leg.  No bowel or bladder dysfunction.    ROS: A comprehensive review of systems was performed and was otherwise negative    Medications:  Prior to Admission medications    Medication Sig Start Date End Date Taking? Authorizing Provider   alendronate (FOSAMAX) 70 MG tablet TAKE 1 TABLET (70 MG TOTAL) BY MOUTH EVERY 7 DAYS. 2/28/18  Yes Hakan oMnson MD   CALCIUM CARBONATE/VITAMIN D3 (CALCIUM 500 + D, D3, ORAL) Take by mouth 2 (two) times a day.   Yes PROVIDER, HISTORICAL   docusate sodium (COLACE) 100 MG capsule Take 100 mg by mouth 2 (two) times a day.   Yes PROVIDER, HISTORICAL   ferrous gluconate (FERGON) 324 MG tablet Take 324 mg by mouth daily with breakfast.   Yes PROVIDER, HISTORICAL   KLOR-CON 10 10 mEq CR tablet TAKE 1 TABLET (10 MEQ TOTAL) BY MOUTH DAILY. 10/23/17  Yes Wilfredo Jimenez MD   multivitamin therapeutic (THERAGRAN) tablet Take 1 tablet by mouth daily.   Yes PROVIDER, HISTORICAL   omega-3 fatty acids (FISH OIL) 500 mg cap Take by mouth 2 (two) times a day.   Yes PROVIDER, HISTORICAL   omeprazole (PRILOSEC) 20 MG capsule TAKE 1 CAPSULE (20 MG TOTAL) BY MOUTH DAILY. 1/5/18  Yes Hakan Monson MD   oxybutynin (DITROPAN XL) 10 MG ER tablet TAKE 1 TABLET (10 MG TOTAL) BY MOUTH DAILY. 7/18/17  Yes Wilfredo Jimenez MD   sodium chloride (OCEAN) 0.65 % nasal spray 1 spray into each nostril as needed for  congestion.    PROVIDER, HISTORICAL   traMADol (ULTRAM) 50 mg tablet Take 1 tablet (50 mg total) by mouth every 8 (eight) hours as needed for pain. 3/13/18   Juan Montoya MD   alendronate (FOSAMAX) 70 MG tablet TAKE 1 TABLET (70 MG TOTAL) BY MOUTH EVERY 7 DAYS. 11/11/17 3/13/18  Hakan Monson MD   alendronate (FOSAMAX) 70 MG tablet TAKE 1 TABLET (70 MG TOTAL) BY MOUTH EVERY 7 DAYS. 3/1/18 3/13/18  Hakan Monson MD   KLOR-CON 10 10 mEq CR tablet TAKE 1 TABLET (10 MEQ TOTAL) BY MOUTH DAILY. 11/11/17 3/13/18  Hakan Monson MD       Allergies:   Allergies   Allergen Reactions     Penicillins        Immunizations:   Immunization History   Administered Date(s) Administered     DT (pediatric) 06/21/2000     Influenza [id number], 01/29/2010     Influenza high dose, seasonal 10/15/2016, 10/12/2017     Influenza, inj, historic,unspecified 11/11/2008, 09/16/2009, 10/28/2010     Influenza, seasonal,quad inj 6-35 mos 10/03/2013, 09/26/2014     Pneumo Conj 13-V (2010&after) 05/05/2015     Pneumo Polysac 23-V 08/02/2005       Exam Chest clear to auscultation and percussion.  Heart tones regular rhythm without murmur rub or gallop.  Abdomen soft nontender no organomegaly.  No peritoneal signs.  Extremities free of edema cyanosis or clubbing.  Neck veins nondistended no thyromegaly or scleral icterus noted, carotids full.  Skin warm and dry easily conversant good spirited.  Normal intelligence.  Neurologically intact no gross localizing findings.  Mild tenderness noted over the lumbar area L1.    Assessment and Plan  Lumbar compression fracture check x-ray of LS-spine.  Add tramadol 50 mg 3 times a day to arthritis strength Tylenol 650 mg take 2 tablets 3 times a day.  Heat or ice topically.  Check back with primary care provider 1 week regarding status.    Penicillin allergy    Time: total time spent with the patient was 25 minutes of which >50% was spent in counseling and coordination of care        Juan Montoya  MD    Patient Active Problem List   Diagnosis     Carpal Tunnel Syndrome     Acute Bronchitis     Mechanical low back pain     Osteoarthrosis     Edema     Anemia in chronic kidney disease     Raynaud's Phenomenon     Cervicalgia     Osteoporosis     Herpes Zoster (Shingles)     Essential Hypertension     Esophageal Reflux     Cough     Dizziness     Lumbar compression fracture     Hypokalemia      PAST MEDICAL HISTORY:  Bladder disorder dropped bladder    Depression     History of cardiac aneurysm     History of sinus tachycardia     Multiple falls     Palpitation     TIA (transient ischemic attack)

## 2022-12-02 VITALS
HEART RATE: 72 BPM | SYSTOLIC BLOOD PRESSURE: 101 MMHG | DIASTOLIC BLOOD PRESSURE: 62 MMHG | RESPIRATION RATE: 18 BRPM | OXYGEN SATURATION: 96 % | TEMPERATURE: 99 F

## 2022-12-02 PROBLEM — Z86.79 PERSONAL HISTORY OF OTHER DISEASES OF THE CIRCULATORY SYSTEM: Chronic | Status: ACTIVE | Noted: 2022-11-08

## 2022-12-02 PROBLEM — R29.6 REPEATED FALLS: Chronic | Status: ACTIVE | Noted: 2022-11-08

## 2022-12-02 PROBLEM — G45.9 TRANSIENT CEREBRAL ISCHEMIC ATTACK, UNSPECIFIED: Chronic | Status: ACTIVE | Noted: 2022-11-08

## 2022-12-02 LAB — TROPONIN I, HIGH SENSITIVITY RESULT: 7.14 NG/L — SIGNIFICANT CHANGE UP

## 2022-12-02 NOTE — ED POST DISCHARGE NOTE - RESULT SUMMARY
05-Nov-2019 20:32 LLL atelectasis. Pt recently had covid. Pt had CT chest in the ER which showed that it was unchanged. No intervention needed. -Jay Pugh PA-C

## 2023-04-24 ENCOUNTER — APPOINTMENT (OUTPATIENT)
Dept: OBGYN | Facility: CLINIC | Age: 88
End: 2023-04-24
Payer: MEDICARE

## 2023-04-24 VITALS
SYSTOLIC BLOOD PRESSURE: 130 MMHG | WEIGHT: 123 LBS | DIASTOLIC BLOOD PRESSURE: 80 MMHG | BODY MASS INDEX: 21.79 KG/M2 | HEIGHT: 63 IN

## 2023-04-24 PROCEDURE — 99202 OFFICE O/P NEW SF 15 MIN: CPT

## 2023-04-24 NOTE — HISTORY OF PRESENT ILLNESS
[FreeTextEntry1] : pt is a 89 y/o p2 presents with daughter for vaginal bleeding x 2 weeks stopped 2 days ago upon dcing blood thinners as per daughter

## 2023-05-10 ENCOUNTER — ASOB RESULT (OUTPATIENT)
Age: 88
End: 2023-05-10

## 2023-05-10 ENCOUNTER — APPOINTMENT (OUTPATIENT)
Dept: OBGYN | Facility: CLINIC | Age: 88
End: 2023-05-10
Payer: MEDICARE

## 2023-05-10 ENCOUNTER — NON-APPOINTMENT (OUTPATIENT)
Age: 88
End: 2023-05-10

## 2023-05-10 DIAGNOSIS — N95.0 POSTMENOPAUSAL BLEEDING: ICD-10-CM

## 2023-05-10 PROCEDURE — 76830 TRANSVAGINAL US NON-OB: CPT

## 2023-05-10 NOTE — HISTORY OF PRESENT ILLNESS
[FreeTextEntry1] : pt presents for tvs however no uterus visualized, ovaries visualized are normal, pt and daughter deny any bleeding at this time which resolved since last visit , pt denies hysterectomy however states she did have surgery for prolapsed bladder

## 2024-04-06 ENCOUNTER — INPATIENT (INPATIENT)
Facility: HOSPITAL | Age: 89
LOS: 13 days | Discharge: HOME | DRG: 689 | End: 2024-04-20
Attending: INTERNAL MEDICINE | Admitting: INTERNAL MEDICINE
Payer: MEDICARE

## 2024-04-06 VITALS
TEMPERATURE: 100 F | HEART RATE: 118 BPM | WEIGHT: 139.55 LBS | RESPIRATION RATE: 20 BRPM | OXYGEN SATURATION: 94 % | SYSTOLIC BLOOD PRESSURE: 157 MMHG | DIASTOLIC BLOOD PRESSURE: 80 MMHG | HEIGHT: 65 IN

## 2024-04-06 DIAGNOSIS — N30.00 ACUTE CYSTITIS WITHOUT HEMATURIA: ICD-10-CM

## 2024-04-06 DIAGNOSIS — Z98.890 OTHER SPECIFIED POSTPROCEDURAL STATES: Chronic | ICD-10-CM

## 2024-04-06 LAB
ALBUMIN SERPL ELPH-MCNC: 3.2 G/DL — LOW (ref 3.3–5)
ALP SERPL-CCNC: 74 U/L — SIGNIFICANT CHANGE UP (ref 30–120)
ALT FLD-CCNC: 17 U/L — SIGNIFICANT CHANGE UP (ref 10–60)
ANION GAP SERPL CALC-SCNC: 8 MMOL/L — SIGNIFICANT CHANGE UP (ref 5–17)
ANION GAP SERPL CALC-SCNC: 9 MMOL/L — SIGNIFICANT CHANGE UP (ref 5–17)
APPEARANCE UR: ABNORMAL
APTT BLD: 36.9 SEC — HIGH (ref 24.5–35.6)
AST SERPL-CCNC: 19 U/L — SIGNIFICANT CHANGE UP (ref 10–40)
BACTERIA # UR AUTO: ABNORMAL /HPF
BASOPHILS # BLD AUTO: 0.04 K/UL — SIGNIFICANT CHANGE UP (ref 0–0.2)
BASOPHILS # BLD AUTO: 0.04 K/UL — SIGNIFICANT CHANGE UP (ref 0–0.2)
BASOPHILS NFR BLD AUTO: 0.5 % — SIGNIFICANT CHANGE UP (ref 0–2)
BASOPHILS NFR BLD AUTO: 0.5 % — SIGNIFICANT CHANGE UP (ref 0–2)
BILIRUB SERPL-MCNC: 0.6 MG/DL — SIGNIFICANT CHANGE UP (ref 0.2–1.2)
BILIRUB UR-MCNC: ABNORMAL
BUN SERPL-MCNC: 17 MG/DL — SIGNIFICANT CHANGE UP (ref 7–23)
BUN SERPL-MCNC: 21 MG/DL — SIGNIFICANT CHANGE UP (ref 7–23)
CALCIUM SERPL-MCNC: 8.1 MG/DL — LOW (ref 8.4–10.5)
CALCIUM SERPL-MCNC: 9.3 MG/DL — SIGNIFICANT CHANGE UP (ref 8.4–10.5)
CHLORIDE SERPL-SCNC: 103 MMOL/L — SIGNIFICANT CHANGE UP (ref 96–108)
CHLORIDE SERPL-SCNC: 107 MMOL/L — SIGNIFICANT CHANGE UP (ref 96–108)
CO2 SERPL-SCNC: 26 MMOL/L — SIGNIFICANT CHANGE UP (ref 22–31)
CO2 SERPL-SCNC: 30 MMOL/L — SIGNIFICANT CHANGE UP (ref 22–31)
COLOR SPEC: SIGNIFICANT CHANGE UP
CREAT SERPL-MCNC: 0.67 MG/DL — SIGNIFICANT CHANGE UP (ref 0.5–1.3)
CREAT SERPL-MCNC: 0.73 MG/DL — SIGNIFICANT CHANGE UP (ref 0.5–1.3)
DIFF PNL FLD: ABNORMAL
EGFR: 77 ML/MIN/1.73M2 — SIGNIFICANT CHANGE UP
EGFR: 82 ML/MIN/1.73M2 — SIGNIFICANT CHANGE UP
EOSINOPHIL # BLD AUTO: 0.21 K/UL — SIGNIFICANT CHANGE UP (ref 0–0.5)
EOSINOPHIL # BLD AUTO: 0.25 K/UL — SIGNIFICANT CHANGE UP (ref 0–0.5)
EOSINOPHIL NFR BLD AUTO: 2.7 % — SIGNIFICANT CHANGE UP (ref 0–6)
EOSINOPHIL NFR BLD AUTO: 3.4 % — SIGNIFICANT CHANGE UP (ref 0–6)
EPI CELLS # UR: PRESENT
GLUCOSE SERPL-MCNC: 109 MG/DL — HIGH (ref 70–99)
GLUCOSE SERPL-MCNC: 92 MG/DL — SIGNIFICANT CHANGE UP (ref 70–99)
GLUCOSE UR QL: NEGATIVE MG/DL — SIGNIFICANT CHANGE UP
HCT VFR BLD CALC: 35.5 % — SIGNIFICANT CHANGE UP (ref 34.5–45)
HCT VFR BLD CALC: 40.4 % — SIGNIFICANT CHANGE UP (ref 34.5–45)
HGB BLD-MCNC: 11.5 G/DL — SIGNIFICANT CHANGE UP (ref 11.5–15.5)
HGB BLD-MCNC: 13.1 G/DL — SIGNIFICANT CHANGE UP (ref 11.5–15.5)
IMM GRANULOCYTES NFR BLD AUTO: 0.1 % — SIGNIFICANT CHANGE UP (ref 0–0.9)
IMM GRANULOCYTES NFR BLD AUTO: 0.3 % — SIGNIFICANT CHANGE UP (ref 0–0.9)
INR BLD: 1.46 RATIO — HIGH (ref 0.85–1.18)
KETONES UR-MCNC: ABNORMAL MG/DL
LACTATE SERPL-SCNC: 0.7 MMOL/L — SIGNIFICANT CHANGE UP (ref 0.7–2)
LEUKOCYTE ESTERASE UR-ACNC: ABNORMAL
LYMPHOCYTES # BLD AUTO: 1.94 K/UL — SIGNIFICANT CHANGE UP (ref 1–3.3)
LYMPHOCYTES # BLD AUTO: 2.21 K/UL — SIGNIFICANT CHANGE UP (ref 1–3.3)
LYMPHOCYTES # BLD AUTO: 24.5 % — SIGNIFICANT CHANGE UP (ref 13–44)
LYMPHOCYTES # BLD AUTO: 30 % — SIGNIFICANT CHANGE UP (ref 13–44)
MCHC RBC-ENTMCNC: 30 PG — SIGNIFICANT CHANGE UP (ref 27–34)
MCHC RBC-ENTMCNC: 30.2 PG — SIGNIFICANT CHANGE UP (ref 27–34)
MCHC RBC-ENTMCNC: 32.4 GM/DL — SIGNIFICANT CHANGE UP (ref 32–36)
MCHC RBC-ENTMCNC: 32.4 GM/DL — SIGNIFICANT CHANGE UP (ref 32–36)
MCV RBC AUTO: 92.7 FL — SIGNIFICANT CHANGE UP (ref 80–100)
MCV RBC AUTO: 93.1 FL — SIGNIFICANT CHANGE UP (ref 80–100)
MONOCYTES # BLD AUTO: 0.82 K/UL — SIGNIFICANT CHANGE UP (ref 0–0.9)
MONOCYTES # BLD AUTO: 0.83 K/UL — SIGNIFICANT CHANGE UP (ref 0–0.9)
MONOCYTES NFR BLD AUTO: 10.4 % — SIGNIFICANT CHANGE UP (ref 2–14)
MONOCYTES NFR BLD AUTO: 11.3 % — SIGNIFICANT CHANGE UP (ref 2–14)
NEUTROPHILS # BLD AUTO: 4.01 K/UL — SIGNIFICANT CHANGE UP (ref 1.8–7.4)
NEUTROPHILS # BLD AUTO: 4.9 K/UL — SIGNIFICANT CHANGE UP (ref 1.8–7.4)
NEUTROPHILS NFR BLD AUTO: 54.5 % — SIGNIFICANT CHANGE UP (ref 43–77)
NEUTROPHILS NFR BLD AUTO: 61.8 % — SIGNIFICANT CHANGE UP (ref 43–77)
NITRITE UR-MCNC: POSITIVE
NRBC # BLD: 0 /100 WBCS — SIGNIFICANT CHANGE UP (ref 0–0)
NRBC # BLD: 0 /100 WBCS — SIGNIFICANT CHANGE UP (ref 0–0)
PH UR: 5 — SIGNIFICANT CHANGE UP (ref 5–8)
PLATELET # BLD AUTO: 197 K/UL — SIGNIFICANT CHANGE UP (ref 150–400)
PLATELET # BLD AUTO: 231 K/UL — SIGNIFICANT CHANGE UP (ref 150–400)
POTASSIUM SERPL-MCNC: 3.5 MMOL/L — SIGNIFICANT CHANGE UP (ref 3.5–5.3)
POTASSIUM SERPL-MCNC: 3.9 MMOL/L — SIGNIFICANT CHANGE UP (ref 3.5–5.3)
POTASSIUM SERPL-SCNC: 3.5 MMOL/L — SIGNIFICANT CHANGE UP (ref 3.5–5.3)
POTASSIUM SERPL-SCNC: 3.9 MMOL/L — SIGNIFICANT CHANGE UP (ref 3.5–5.3)
PROT SERPL-MCNC: 6.7 G/DL — SIGNIFICANT CHANGE UP (ref 6–8.3)
PROT UR-MCNC: NEGATIVE MG/DL — SIGNIFICANT CHANGE UP
PROTHROM AB SERPL-ACNC: 15.7 SEC — HIGH (ref 9.5–13)
RAPID RVP RESULT: SIGNIFICANT CHANGE UP
RBC # BLD: 3.83 M/UL — SIGNIFICANT CHANGE UP (ref 3.8–5.2)
RBC # BLD: 4.34 M/UL — SIGNIFICANT CHANGE UP (ref 3.8–5.2)
RBC # FLD: 13.8 % — SIGNIFICANT CHANGE UP (ref 10.3–14.5)
RBC # FLD: 13.9 % — SIGNIFICANT CHANGE UP (ref 10.3–14.5)
RBC CASTS # UR COMP ASSIST: 6 /HPF — HIGH (ref 0–4)
SARS-COV-2 RNA SPEC QL NAA+PROBE: SIGNIFICANT CHANGE UP
SODIUM SERPL-SCNC: 141 MMOL/L — SIGNIFICANT CHANGE UP (ref 135–145)
SODIUM SERPL-SCNC: 142 MMOL/L — SIGNIFICANT CHANGE UP (ref 135–145)
SP GR SPEC: 1.02 — SIGNIFICANT CHANGE UP (ref 1–1.03)
UROBILINOGEN FLD QL: 1 MG/DL — SIGNIFICANT CHANGE UP (ref 0.2–1)
WBC # BLD: 7.36 K/UL — SIGNIFICANT CHANGE UP (ref 3.8–10.5)
WBC # BLD: 7.92 K/UL — SIGNIFICANT CHANGE UP (ref 3.8–10.5)
WBC # FLD AUTO: 7.36 K/UL — SIGNIFICANT CHANGE UP (ref 3.8–10.5)
WBC # FLD AUTO: 7.92 K/UL — SIGNIFICANT CHANGE UP (ref 3.8–10.5)
WBC UR QL: >-50 /HPF — LOW (ref 0–5)

## 2024-04-06 PROCEDURE — 99223 1ST HOSP IP/OBS HIGH 75: CPT

## 2024-04-06 PROCEDURE — 93010 ELECTROCARDIOGRAM REPORT: CPT

## 2024-04-06 PROCEDURE — 99285 EMERGENCY DEPT VISIT HI MDM: CPT

## 2024-04-06 PROCEDURE — 99233 SBSQ HOSP IP/OBS HIGH 50: CPT

## 2024-04-06 PROCEDURE — 71045 X-RAY EXAM CHEST 1 VIEW: CPT | Mod: 26,77

## 2024-04-06 PROCEDURE — 71045 X-RAY EXAM CHEST 1 VIEW: CPT | Mod: 26

## 2024-04-06 RX ORDER — SODIUM CHLORIDE 9 MG/ML
1000 INJECTION INTRAMUSCULAR; INTRAVENOUS; SUBCUTANEOUS
Refills: 0 | Status: DISCONTINUED | OUTPATIENT
Start: 2024-04-06 | End: 2024-04-08

## 2024-04-06 RX ORDER — CITALOPRAM 10 MG/1
1 TABLET, FILM COATED ORAL
Qty: 0 | Refills: 0 | DISCHARGE

## 2024-04-06 RX ORDER — CHOLECALCIFEROL (VITAMIN D3) 125 MCG
1 CAPSULE ORAL
Refills: 0 | DISCHARGE

## 2024-04-06 RX ORDER — SODIUM CHLORIDE 9 MG/ML
1000 INJECTION INTRAMUSCULAR; INTRAVENOUS; SUBCUTANEOUS ONCE
Refills: 0 | Status: COMPLETED | OUTPATIENT
Start: 2024-04-06 | End: 2024-04-06

## 2024-04-06 RX ORDER — APIXABAN 2.5 MG/1
1 TABLET, FILM COATED ORAL
Refills: 0 | DISCHARGE

## 2024-04-06 RX ORDER — APIXABAN 2.5 MG/1
2.5 TABLET, FILM COATED ORAL
Refills: 0 | Status: DISCONTINUED | OUTPATIENT
Start: 2024-04-06 | End: 2024-04-20

## 2024-04-06 RX ORDER — PREGABALIN 225 MG/1
1 CAPSULE ORAL
Refills: 0 | DISCHARGE

## 2024-04-06 RX ORDER — CITALOPRAM 10 MG/1
20 TABLET, FILM COATED ORAL DAILY
Refills: 0 | Status: DISCONTINUED | OUTPATIENT
Start: 2024-04-06 | End: 2024-04-20

## 2024-04-06 RX ORDER — ACETAMINOPHEN 500 MG
1000 TABLET ORAL ONCE
Refills: 0 | Status: COMPLETED | OUTPATIENT
Start: 2024-04-06 | End: 2024-04-06

## 2024-04-06 RX ORDER — ROSUVASTATIN CALCIUM 5 MG/1
1 TABLET ORAL
Refills: 0 | DISCHARGE

## 2024-04-06 RX ORDER — LANOLIN ALCOHOL/MO/W.PET/CERES
3 CREAM (GRAM) TOPICAL AT BEDTIME
Refills: 0 | Status: DISCONTINUED | OUTPATIENT
Start: 2024-04-06 | End: 2024-04-20

## 2024-04-06 RX ORDER — METOPROLOL TARTRATE 50 MG
25 TABLET ORAL DAILY
Refills: 0 | Status: DISCONTINUED | OUTPATIENT
Start: 2024-04-06 | End: 2024-04-14

## 2024-04-06 RX ORDER — CEFTRIAXONE 500 MG/1
1000 INJECTION, POWDER, FOR SOLUTION INTRAMUSCULAR; INTRAVENOUS EVERY 24 HOURS
Refills: 0 | Status: COMPLETED | OUTPATIENT
Start: 2024-04-06 | End: 2024-04-10

## 2024-04-06 RX ORDER — PREGABALIN 225 MG/1
1000 CAPSULE ORAL DAILY
Refills: 0 | Status: DISCONTINUED | OUTPATIENT
Start: 2024-04-06 | End: 2024-04-20

## 2024-04-06 RX ORDER — ACETAMINOPHEN 500 MG
650 TABLET ORAL EVERY 6 HOURS
Refills: 0 | Status: DISCONTINUED | OUTPATIENT
Start: 2024-04-06 | End: 2024-04-20

## 2024-04-06 RX ORDER — ATORVASTATIN CALCIUM 80 MG/1
20 TABLET, FILM COATED ORAL AT BEDTIME
Refills: 0 | Status: DISCONTINUED | OUTPATIENT
Start: 2024-04-06 | End: 2024-04-20

## 2024-04-06 RX ORDER — CEFTRIAXONE 500 MG/1
1000 INJECTION, POWDER, FOR SOLUTION INTRAMUSCULAR; INTRAVENOUS ONCE
Refills: 0 | Status: COMPLETED | OUTPATIENT
Start: 2024-04-06 | End: 2024-04-06

## 2024-04-06 RX ORDER — ONDANSETRON 8 MG/1
4 TABLET, FILM COATED ORAL EVERY 8 HOURS
Refills: 0 | Status: DISCONTINUED | OUTPATIENT
Start: 2024-04-06 | End: 2024-04-20

## 2024-04-06 RX ORDER — METOPROLOL TARTRATE 50 MG
5 TABLET ORAL ONCE
Refills: 0 | Status: COMPLETED | OUTPATIENT
Start: 2024-04-06 | End: 2024-04-06

## 2024-04-06 RX ADMIN — Medication 5 MILLIGRAM(S): at 04:47

## 2024-04-06 RX ADMIN — Medication 1000 MILLIGRAM(S): at 01:25

## 2024-04-06 RX ADMIN — CEFTRIAXONE 100 MILLIGRAM(S): 500 INJECTION, POWDER, FOR SOLUTION INTRAMUSCULAR; INTRAVENOUS at 01:26

## 2024-04-06 RX ADMIN — APIXABAN 2.5 MILLIGRAM(S): 2.5 TABLET, FILM COATED ORAL at 18:06

## 2024-04-06 RX ADMIN — APIXABAN 2.5 MILLIGRAM(S): 2.5 TABLET, FILM COATED ORAL at 05:26

## 2024-04-06 RX ADMIN — SODIUM CHLORIDE 1000 MILLILITER(S): 9 INJECTION INTRAMUSCULAR; INTRAVENOUS; SUBCUTANEOUS at 02:12

## 2024-04-06 RX ADMIN — Medication 1000 MILLIGRAM(S): at 01:40

## 2024-04-06 RX ADMIN — SODIUM CHLORIDE 1000 MILLILITER(S): 9 INJECTION INTRAMUSCULAR; INTRAVENOUS; SUBCUTANEOUS at 01:10

## 2024-04-06 RX ADMIN — Medication 400 MILLIGRAM(S): at 01:10

## 2024-04-06 RX ADMIN — PREGABALIN 1000 MICROGRAM(S): 225 CAPSULE ORAL at 11:48

## 2024-04-06 RX ADMIN — Medication 25 MILLIGRAM(S): at 02:45

## 2024-04-06 RX ADMIN — CITALOPRAM 20 MILLIGRAM(S): 10 TABLET, FILM COATED ORAL at 02:45

## 2024-04-06 RX ADMIN — SODIUM CHLORIDE 84 MILLILITER(S): 9 INJECTION INTRAMUSCULAR; INTRAVENOUS; SUBCUTANEOUS at 20:58

## 2024-04-06 RX ADMIN — SODIUM CHLORIDE 84 MILLILITER(S): 9 INJECTION INTRAMUSCULAR; INTRAVENOUS; SUBCUTANEOUS at 13:54

## 2024-04-06 RX ADMIN — CEFTRIAXONE 1000 MILLIGRAM(S): 500 INJECTION, POWDER, FOR SOLUTION INTRAMUSCULAR; INTRAVENOUS at 01:40

## 2024-04-06 RX ADMIN — SODIUM CHLORIDE 84 MILLILITER(S): 9 INJECTION INTRAMUSCULAR; INTRAVENOUS; SUBCUTANEOUS at 02:57

## 2024-04-06 NOTE — H&P ADULT - HISTORY OF PRESENT ILLNESS
89 y/o female with PMH of TIA, Afib on eliquis, dementia, UTI who presented to ED to be evaluated for AMS. pt lives home with her family and gets around by means of walker. about 20 days ago pt noted to have visual hallucination. per daughter pt saw little girl in room. pt was taken to her PCP as UTI had led to such symptoms in past. pt was placed on 10 days of keflex with improvement. pt had F/u appt with her PCP about 1 week ago where everything including urine cx was reported to be normal. in last 2 days pt became more confused. trying to get out of bed at night on her own and starts walking w/o walker. per daughter pt have been more weak and lethargy during day time. pt has home health visiting 6 days/week only during daytime. daughter thinks that pt is still having UTI and was BIBA.  pt seems to ne pleasantly confused upon my initial evaluation. currently A and O X 3. pt does not recal any visual or auditory hallucination pt is not sure why she is here  denies any abdominal pain, nausea,vomiting, fever,chills or any urinary symptoms     pt found to have UTI in ED for which she is to be admitted

## 2024-04-06 NOTE — H&P ADULT - NSHPPHYSICALEXAM_GEN_ALL_CORE
T(C): 37.6 (04-06-24 @ 00:33), Max: 37.6 (04-06-24 @ 00:33)  HR: 99 (04-06-24 @ 01:40) (99 - 118)  BP: 157/80 (04-06-24 @ 00:33) (157/80 - 157/80)  RR: 20 (04-06-24 @ 00:33) (20 - 20)  SpO2: 94% (04-06-24 @ 00:33) (94% - 94%)  Wt(kg): --Vital Signs Last 24 Hrs  T(C): 37.6 (06 Apr 2024 00:33), Max: 37.6 (06 Apr 2024 00:33)  T(F): 99.6 (06 Apr 2024 00:33), Max: 99.6 (06 Apr 2024 00:33)  HR: 99 (06 Apr 2024 01:40) (99 - 118)  BP: 157/80 (06 Apr 2024 00:33) (157/80 - 157/80)  BP(mean): --  RR: 20 (06 Apr 2024 00:33) (20 - 20)  SpO2: 94% (06 Apr 2024 00:33) (94% - 94%)    Parameters below as of 06 Apr 2024 00:33  Patient On (Oxygen Delivery Method): room air        PHYSICAL EXAM:  GENERAL: NAD  HENT:  Atraumatic, Normocephalic; No tonsillar erythema, exudates, or enlargement; Moist mucous membranes;   EYES: EOMI, PERRLA, conjunctiva and sclera clear, no lid-lag  NECK: Supple, No JVD, Normal thyroid  NERVOUS SYSTEM:  CN II - XII intact; Sensation intact; Motor Strength 5/5 B/L upper and lower extremities  CHEST/LUNG: Clear to percussion bilaterally; No rales, rhonchi, wheezing, or rubs; normal respiratory effort, no intercostal retractions; No pitting edema  HEART: Irregulary irregular rythm; No murmurs, rubs, or gallops  ABDOMEN: Soft, Nontender, Nondistended; Bowel sounds present; No HSM  MUSCULOSKELETAL/EXTREMITIES:  2+ Peripheral Pulses, No clubbing, or digital cyanosis  SKIN: No rashes or lesions; normal texture and temperature  PSYCH: Appropriate affect, Alert & Oriented x 3

## 2024-04-06 NOTE — OCCUPATIONAL THERAPY INITIAL EVALUATION ADULT - PERTINENT HX OF CURRENT PROBLEM, REHAB EVAL
89 y/o female with PMH of TIA, Afib on eliquis, dementia, UTI who presented to ED to be evaluated for AMS. pt lives home with her family and gets around by means of walker. about 20 days ago pt noted to have visual hallucination. per daughter pt saw little girl in room. pt was taken to her PCP as UTI had led to such symptoms in past. pt was placed on 10 days of keflex with improvement. pt had F/u appt with her PCP about 1 week ago where everything including urine cx was reported to be normal. in last 2 days pt became more confused. trying to get out of bed at night on her own and starts walking w/o walker. per daughter pt have been more weak and lethargy during day time. pt has home health visiting 6 days/week only during daytime. daughter thinks that pt is still having UTI and was BIBA.  pt seems to ne pleasantly confused upon my initial evaluation. currently A and O X 3. pt does not recal any visual or auditory hallucination pt is not sure why she is here  denies any abdominal pain, nausea,vomiting, fever,chills or any urinary symptoms

## 2024-04-06 NOTE — PATIENT PROFILE ADULT - NSPROMEDSADMININFO_GEN_A_NUR
NURSE ANTICOAGULATION PHONE CONTACT    Jennifer Martin 1948 contacted by phone today for 1/2 week INR results  No outpatient medications have been marked as taking for the 12/6/18 encounter (Telephone) with Agatha Mac MD.       INR (no units)   Date Value   12/06/2018 6.8 ()     GOAL RANGE: 2.0-3.0    ALLERGIES:   Allergen Reactions   • Augmentin [Amoclan] RASH   • Lisinopril Cough   • Seasonal Other (See Comments)   • Zithromax [Azithromycin Dihydrate] RASH       Patient Active Problem List   Diagnosis   • PAC (premature atrial contraction)   • PVC's (premature ventricular contractions)   • Shingles   • Atrial flutter (CMS/HCC)   • Benign essential HTN   • Paroxysmal atrial fibrillation (CMS/HCC)   • Acute systolic congestive heart failure (CMS/HCC)   • Hypertensive urgency   • Shortness of breath   • Encounter for therapeutic drug monitoring [Z51.81]   • Long term (current) use of anticoagulants [Z79.01]       PATIENT REPORTED CHANGES: patient denied any bleeding or bruising concerns post procedure. She is using less pain medications and eating better.    NURSE DOSING ADJUSTMENTS AND EDUCATION: holding until INR on 12/10 with increased greens. Patient instructed that if bleeding concerns develop or if fall, especially strike to head, to seek medical attention right away.  She will also inform physical therapist of elevated INR tomorrow before therapy so they are aware.        Patient will recheck INR on 12/10,sooner if changes or concerns develop.  Warfarin management done via phone according to protocol.  Reviewed signs and symptoms of bleeding and clotting with patient.  Reviewed and reinforced with patient the importance of calling the clinic with any medication, diet, and health related changes. Education on importance of adherence to consistent diet in Vitamin K and ETOH also discussed.   Marlena Mendosa RN     no concerns

## 2024-04-06 NOTE — ED ADULT NURSE NOTE - NSFALLHARMRISKINTERV_ED_ALL_ED

## 2024-04-06 NOTE — ED ADULT NURSE NOTE - NS ED NURSE DISCH DISPOSITION
[address] [place] [address] from [address] called and states that the patient has an appointment coming up and needs an updated referral. Please advise.
Admitted

## 2024-04-06 NOTE — ED ADULT NURSE NOTE - NSICDXPASTMEDICALHX_GEN_ALL_CORE_FT
PAST MEDICAL HISTORY:  Atrial fibrillation     Bladder disorder dropped bladder    Dementia     Depression     History of cardiac aneurysm     History of sinus tachycardia     Multiple falls     Palpitation     TIA (transient ischemic attack)

## 2024-04-06 NOTE — ED PROVIDER NOTE - CARE PLAN
1 Principal Discharge DX:	Acute cystitis  Secondary Diagnosis:	Delirium  Secondary Diagnosis:	Generalized weakness

## 2024-04-06 NOTE — PHYSICAL THERAPY INITIAL EVALUATION ADULT - ADDITIONAL COMMENTS
Pt is poor historian. Per pt, she lives in house with her daughters and son in law. Home has several steps to enter and no steps inside. Pt was using RW at home. Pt's daughter assisted her with all ADLs.

## 2024-04-06 NOTE — ED PROVIDER NOTE - OBJECTIVE STATEMENT
89-year-old female with a history of frequent UTIs, falls, dementia, TIA, A-fib, depression presents with weakness, hallucinations, fatigue.  Patient BIBA, lives at home with family.  History provided by patient and daughter, Marjan.  Daughter states that 2 weeks ago patient hallucinated.  She reported seeing a little girl in the house who was not there.  Daughter suspected that patient might have a UTI as this is how they have presented in the past.  She took patient to the urologist who started patient on a 10-day course of Keflex.  Patient's mental status improved after the antibiotics and she followed up with her primary care doctor 1 week ago.  She had unremarkable blood work and her urine culture was negative as well.  Daughter states that for the past few days patient has had increasing the fatigue, decreased p.o. intake, and persistent weakness.  She now has some confusion during the day and daughter states that patient has been attempting to get out of bed at night despite the fact that she is unable to ambulate without assistance.  Patient has an aide that comes 6 days a week during the daytime.  Daughter is unsure of patient's code status.  PMD Sundar Owen
Temp 100.5, WBC 14.5 + positive UA. No hx of UTIs previously. No other source suspected.    - CTX continue  - UCx, BCx, procal >> f/u  - Fluids: 3L fluids given in ED, hemodynamically stable

## 2024-04-06 NOTE — ED ADULT NURSE NOTE - PERIPHERAL PULSES
"Ned is a 59 year old who is being evaluated via a billable telephone visit.      What phone number would you like to be contacted at? [phone]  How would you like to obtain your AVS? MyChart     Vitals - Patient Reported  Weight (Patient Reported): 102.1 kg (225 lb)  Height (Patient Reported): 174.6 cm (5' 8.75\")  BMI (Based on Pt Reported Ht/Wt): 33.47  Pain Score: No Pain (0)    Jazz FARR    " equal bilaterally

## 2024-04-06 NOTE — ED PROVIDER NOTE - WR ORDER DATE AND TIME 1
[FreeTextEntry1] : Documented by Nithin Garcia acting as a scribe for Dr. Holger Napier on 03/27/2019 06-Apr-2024 01:00

## 2024-04-06 NOTE — PATIENT PROFILE ADULT - FALL HARM RISK - HARM RISK INTERVENTIONS

## 2024-04-06 NOTE — H&P ADULT - ASSESSMENT
89 y/o female presented to ED due to AMS found to have UTI:  -c/w rocephine 1 gm qd   -F/u urine cx and sensitivity  -gentle IV hydration  -monitor fever curve and if develops any despite on antibiotics for 48 hours then will have low threshold to obtain CT abd/pel   -F/u blood cx    Afib:  -C/w metoprolol succ 25 mg qd  -C/w eliquis 2.5 mg bid    HLD:  -C/w lipitor 20 mg qd    code status: Full (discussed with pt at bedside)   89 y/o female presented to ED due to AMS found to have UTI:  -c/w rocephine 1 gm qd   -F/u urine cx and sensitivity  -gentle IV hydration  -monitor fever curve and if develops any despite on antibiotics for 48 hours then will have low threshold to obtain CT abd/pel   -F/u blood cx  -PT/OT eval     Afib:  -C/w metoprolol succ 25 mg qd  -C/w eliquis 2.5 mg bid  -monitor on tele     HLD:  -C/w lipitor 20 mg qd    code status: Full (discussed with pt at bedside)

## 2024-04-06 NOTE — ED PROVIDER NOTE - DIFFERENTIAL DIAGNOSIS
Differential Diagnosis Ddx includes but not limited to UTI, pyelonephritis, PNA, sepsis, dehydration, electrolyte imbalance, anemia, a-fib

## 2024-04-06 NOTE — ED PROVIDER NOTE - CLINICAL SUMMARY MEDICAL DECISION MAKING FREE TEXT BOX
89 year old female with a-fib, frequent UTIs p/w hallucinations, fatigue, weakness, decreased PO intake.  Daughter states similar presentation with prior UTIs.  Notes worsening mental status x 2 weeks.  Check labs, lactate, blood and urine cultures, straight cath UA, CXR, RVP, hydrate, admit, IV abx as needed

## 2024-04-07 LAB
ALBUMIN SERPL ELPH-MCNC: 2.7 G/DL — LOW (ref 3.3–5)
ALP SERPL-CCNC: 59 U/L — SIGNIFICANT CHANGE UP (ref 30–120)
ALT FLD-CCNC: 20 U/L — SIGNIFICANT CHANGE UP (ref 10–60)
ANION GAP SERPL CALC-SCNC: 7 MMOL/L — SIGNIFICANT CHANGE UP (ref 5–17)
AST SERPL-CCNC: 16 U/L — SIGNIFICANT CHANGE UP (ref 10–40)
BILIRUB SERPL-MCNC: 0.4 MG/DL — SIGNIFICANT CHANGE UP (ref 0.2–1.2)
BUN SERPL-MCNC: 12 MG/DL — SIGNIFICANT CHANGE UP (ref 7–23)
CALCIUM SERPL-MCNC: 8.2 MG/DL — LOW (ref 8.4–10.5)
CHLORIDE SERPL-SCNC: 108 MMOL/L — SIGNIFICANT CHANGE UP (ref 96–108)
CO2 SERPL-SCNC: 24 MMOL/L — SIGNIFICANT CHANGE UP (ref 22–31)
CREAT SERPL-MCNC: 0.53 MG/DL — SIGNIFICANT CHANGE UP (ref 0.5–1.3)
EGFR: 87 ML/MIN/1.73M2 — SIGNIFICANT CHANGE UP
GLUCOSE SERPL-MCNC: 78 MG/DL — SIGNIFICANT CHANGE UP (ref 70–99)
HCT VFR BLD CALC: 37.7 % — SIGNIFICANT CHANGE UP (ref 34.5–45)
HGB BLD-MCNC: 11.8 G/DL — SIGNIFICANT CHANGE UP (ref 11.5–15.5)
MCHC RBC-ENTMCNC: 29.5 PG — SIGNIFICANT CHANGE UP (ref 27–34)
MCHC RBC-ENTMCNC: 31.3 GM/DL — LOW (ref 32–36)
MCV RBC AUTO: 94.3 FL — SIGNIFICANT CHANGE UP (ref 80–100)
NRBC # BLD: 0 /100 WBCS — SIGNIFICANT CHANGE UP (ref 0–0)
PLATELET # BLD AUTO: 198 K/UL — SIGNIFICANT CHANGE UP (ref 150–400)
POTASSIUM SERPL-MCNC: 3.7 MMOL/L — SIGNIFICANT CHANGE UP (ref 3.5–5.3)
POTASSIUM SERPL-SCNC: 3.7 MMOL/L — SIGNIFICANT CHANGE UP (ref 3.5–5.3)
PROT SERPL-MCNC: 5.4 G/DL — LOW (ref 6–8.3)
RBC # BLD: 4 M/UL — SIGNIFICANT CHANGE UP (ref 3.8–5.2)
RBC # FLD: 13.6 % — SIGNIFICANT CHANGE UP (ref 10.3–14.5)
SODIUM SERPL-SCNC: 139 MMOL/L — SIGNIFICANT CHANGE UP (ref 135–145)
WBC # BLD: 7.02 K/UL — SIGNIFICANT CHANGE UP (ref 3.8–10.5)
WBC # FLD AUTO: 7.02 K/UL — SIGNIFICANT CHANGE UP (ref 3.8–10.5)

## 2024-04-07 PROCEDURE — 99232 SBSQ HOSP IP/OBS MODERATE 35: CPT

## 2024-04-07 RX ORDER — ENOXAPARIN SODIUM 100 MG/ML
40 INJECTION SUBCUTANEOUS ONCE
Refills: 0 | Status: COMPLETED | OUTPATIENT
Start: 2024-04-07 | End: 2024-04-07

## 2024-04-07 RX ADMIN — CEFTRIAXONE 100 MILLIGRAM(S): 500 INJECTION, POWDER, FOR SOLUTION INTRAMUSCULAR; INTRAVENOUS at 00:52

## 2024-04-07 RX ADMIN — SODIUM CHLORIDE 84 MILLILITER(S): 9 INJECTION INTRAMUSCULAR; INTRAVENOUS; SUBCUTANEOUS at 10:42

## 2024-04-07 RX ADMIN — SODIUM CHLORIDE 84 MILLILITER(S): 9 INJECTION INTRAMUSCULAR; INTRAVENOUS; SUBCUTANEOUS at 21:55

## 2024-04-07 RX ADMIN — ENOXAPARIN SODIUM 40 MILLIGRAM(S): 100 INJECTION SUBCUTANEOUS at 21:55

## 2024-04-08 LAB
ALBUMIN SERPL ELPH-MCNC: 2.5 G/DL — LOW (ref 3.3–5)
ALP SERPL-CCNC: 58 U/L — SIGNIFICANT CHANGE UP (ref 30–120)
ALT FLD-CCNC: 11 U/L — SIGNIFICANT CHANGE UP (ref 10–60)
ANION GAP SERPL CALC-SCNC: 10 MMOL/L — SIGNIFICANT CHANGE UP (ref 5–17)
AST SERPL-CCNC: 15 U/L — SIGNIFICANT CHANGE UP (ref 10–40)
BILIRUB SERPL-MCNC: 0.5 MG/DL — SIGNIFICANT CHANGE UP (ref 0.2–1.2)
BUN SERPL-MCNC: 7 MG/DL — SIGNIFICANT CHANGE UP (ref 7–23)
CALCIUM SERPL-MCNC: 8.4 MG/DL — SIGNIFICANT CHANGE UP (ref 8.4–10.5)
CHLORIDE SERPL-SCNC: 112 MMOL/L — HIGH (ref 96–108)
CO2 SERPL-SCNC: 24 MMOL/L — SIGNIFICANT CHANGE UP (ref 22–31)
CREAT SERPL-MCNC: 0.57 MG/DL — SIGNIFICANT CHANGE UP (ref 0.5–1.3)
EGFR: 85 ML/MIN/1.73M2 — SIGNIFICANT CHANGE UP
GLUCOSE SERPL-MCNC: 77 MG/DL — SIGNIFICANT CHANGE UP (ref 70–99)
HCT VFR BLD CALC: 36.1 % — SIGNIFICANT CHANGE UP (ref 34.5–45)
HGB BLD-MCNC: 11.6 G/DL — SIGNIFICANT CHANGE UP (ref 11.5–15.5)
MCHC RBC-ENTMCNC: 29.4 PG — SIGNIFICANT CHANGE UP (ref 27–34)
MCHC RBC-ENTMCNC: 32.1 GM/DL — SIGNIFICANT CHANGE UP (ref 32–36)
MCV RBC AUTO: 91.4 FL — SIGNIFICANT CHANGE UP (ref 80–100)
NRBC # BLD: 0 /100 WBCS — SIGNIFICANT CHANGE UP (ref 0–0)
PLATELET # BLD AUTO: 201 K/UL — SIGNIFICANT CHANGE UP (ref 150–400)
POTASSIUM SERPL-MCNC: 3.2 MMOL/L — LOW (ref 3.5–5.3)
POTASSIUM SERPL-SCNC: 3.2 MMOL/L — LOW (ref 3.5–5.3)
PROT SERPL-MCNC: 5.4 G/DL — LOW (ref 6–8.3)
RBC # BLD: 3.95 M/UL — SIGNIFICANT CHANGE UP (ref 3.8–5.2)
RBC # FLD: 13.3 % — SIGNIFICANT CHANGE UP (ref 10.3–14.5)
SODIUM SERPL-SCNC: 146 MMOL/L — HIGH (ref 135–145)
WBC # BLD: 6.22 K/UL — SIGNIFICANT CHANGE UP (ref 3.8–10.5)
WBC # FLD AUTO: 6.22 K/UL — SIGNIFICANT CHANGE UP (ref 3.8–10.5)

## 2024-04-08 PROCEDURE — 99232 SBSQ HOSP IP/OBS MODERATE 35: CPT

## 2024-04-08 RX ORDER — DEXTROSE MONOHYDRATE, SODIUM CHLORIDE, AND POTASSIUM CHLORIDE 50; .745; 4.5 G/1000ML; G/1000ML; G/1000ML
1000 INJECTION, SOLUTION INTRAVENOUS
Refills: 0 | Status: DISCONTINUED | OUTPATIENT
Start: 2024-04-08 | End: 2024-04-12

## 2024-04-08 RX ORDER — POTASSIUM CHLORIDE 20 MEQ
40 PACKET (EA) ORAL ONCE
Refills: 0 | Status: COMPLETED | OUTPATIENT
Start: 2024-04-08 | End: 2024-04-08

## 2024-04-08 RX ORDER — POTASSIUM CHLORIDE 20 MEQ
10 PACKET (EA) ORAL
Refills: 0 | Status: DISCONTINUED | OUTPATIENT
Start: 2024-04-08 | End: 2024-04-08

## 2024-04-08 RX ADMIN — CITALOPRAM 20 MILLIGRAM(S): 10 TABLET, FILM COATED ORAL at 11:39

## 2024-04-08 RX ADMIN — DEXTROSE MONOHYDRATE, SODIUM CHLORIDE, AND POTASSIUM CHLORIDE 75 MILLILITER(S): 50; .745; 4.5 INJECTION, SOLUTION INTRAVENOUS at 12:13

## 2024-04-08 RX ADMIN — DEXTROSE MONOHYDRATE, SODIUM CHLORIDE, AND POTASSIUM CHLORIDE 75 MILLILITER(S): 50; .745; 4.5 INJECTION, SOLUTION INTRAVENOUS at 23:48

## 2024-04-08 RX ADMIN — Medication 40 MILLIEQUIVALENT(S): at 14:13

## 2024-04-08 RX ADMIN — ATORVASTATIN CALCIUM 20 MILLIGRAM(S): 80 TABLET, FILM COATED ORAL at 21:52

## 2024-04-08 RX ADMIN — PREGABALIN 1000 MICROGRAM(S): 225 CAPSULE ORAL at 11:39

## 2024-04-08 RX ADMIN — CEFTRIAXONE 100 MILLIGRAM(S): 500 INJECTION, POWDER, FOR SOLUTION INTRAMUSCULAR; INTRAVENOUS at 00:33

## 2024-04-08 RX ADMIN — APIXABAN 2.5 MILLIGRAM(S): 2.5 TABLET, FILM COATED ORAL at 18:50

## 2024-04-08 NOTE — DIETITIAN INITIAL EVALUATION ADULT - OTHER INFO
Visited patient in room, was NPO x1 day, s/p Speech-Language Pathologist evaluation recommended puree/thin liquid diet. Diet advanced to puree/thin at noon. Denies n/v/d/c, no BM noted. NKFA. Per chart review, pt wt 155# from previous admission Nov 2022, current adm weight 140#, 9.6% wt loss in 2 years, will continue to monitor weight trends as able.     Pertinent medications/nutrition labs reviewed; noted hypernatremia, hypokalemia receiving IVF, antibiotic, vitamin V12 in house.     Education is not appropriate at this time. Will add Ensure High Protein plus 8oz (350 kcal, 20g protein) bid to optimize intake. RD to continue to monitor nutrition status per protocol.  Visited patient in room, was NPO x1 day, s/p Speech-Language Pathologist evaluation recommended puree/thin liquid diet. Diet advanced to puree/thin at noon. Denies n/v/d/c, no BM noted. NKFA. Per chart review, pt wt 155# from previous admission Nov 2022, current adm weight 140#, 9.6% wt loss in 2 years, will continue to monitor weight trends as able.     Pertinent medications/nutrition labs reviewed; noted hypernatremia, hypokalemia receiving IVF, antibiotic, vitamin V12 in house.     Education is not appropriate at this time. Will add Ensure High Protein plus 8oz (350 kcal, 20g protein) qd to optimize intake. RD to continue to monitor nutrition status per protocol.

## 2024-04-08 NOTE — DIETITIAN INITIAL EVALUATION ADULT - ADD RECOMMEND
1. Continue with current diet order  2. Provide ONS: Ensure High Protein plus 8oz (350 kcal, 20g protein) bid  3. Encourage po intake as needed  1. Continue with current diet order  2. Provide ONS: Ensure High Protein plus 8oz (350 kcal, 20g protein) qd  3. Encourage po intake as needed  1. Continue with current diet order  2. Provide ONS: Ensure High Protein plus 8oz (350 kcal, 20g protein) qd  3. Encourage po intake as needed   4. Diet modified and sent to MD via teams

## 2024-04-08 NOTE — CONSULT NOTE ADULT - ASSESSMENT
Pt is a 89W w/ PMHx of TIA, Afib on eliquis, dementia, UTI presenting for AMS--visual hallucinations  S/p outpatient keflex w/ improvement, however AMS recurred    AMS likely in setting of Acute Cystitis  Afebrile, no leukocytosis  grossly positive UA  Was found to have recurrent acute cystitis, started on ceftriaxone    Recommendations:   C/w ceftriaxone  F/u UCx--E.coli; susceptibilities pending  Trend temps/WBC  Further recs to follow pending above    Infectious Diseases will continue to follow. Please call with any questions.   Gloria Gallegos M.D.  [facility] Division of Infectious Diseases [phone]  For after 5 P.M. and weekends, please call [phone]

## 2024-04-08 NOTE — SWALLOW BEDSIDE ASSESSMENT ADULT - ASR SWALLOW RECOMMEND DIAG
If silent aspiration is of concern given recent chest imaging, consider MBS for objective view of pharyngeal stage to rule out silent aspiration.

## 2024-04-08 NOTE — CARE COORDINATION ASSESSMENT. - ASSESSMENT CONCERNS TO BE ADDRESSED
Pt is a 89+ year old female who lives home with her 2 daughters (1 daughter is special needs).  Pt admitted for weakess, UTI.  Pt has dementia.  SW spoke to patients daughter Marjan who is patients HCP/POA.  Marjan states that patient is on first floor of home.  Pt had [facility] aides from [facility] 10-6 M-Fri and 1-5 on Saturdays.  Daughter care for patient when aide is not there and she also cares for her special needs sister.  Pt had been ambulating with walker pta.  PT is recommending [facility] which daughter is in agreement with.  However, she may not have medicare days left.  Daughter gave choices of 1. [facility] 2. [facility] 3. [facility].  SW to follow for discharge readiness and support./care coordination/mental health

## 2024-04-08 NOTE — CARE COORDINATION ASSESSMENT. - HOME EQUIPMENT YN
Pre-op Diagnosis:   1.  Intrauterine Pregnancy at 38 WGA  2.  Previous c/s x 2  3. IUGR    Postop Diagnosis: Same  Procedure: Repeat Low Transverse  Section via Pfannenstiel incision  Surgeon: Day Villavicencio MD  Anesthesia: Spinal  Complications: None  QBL: per RN  Findings: Normal uterus, tubes and ovaries.  Viable female infant with Apgars of 8,9 weighing 5lbs 5oz  Specimen: Placenta    Indication and Consent: Patient presented to L&D scheduled repeat  delivery. I discussed risks, benefits and options with her in detail, including trial of labor.  She desired to proceed with a repeat  delivery. The patient understood that the risks of  section include, but are not limited to, visceral or vascular injury, infection, blood loss and the need for transfusion, prolonged hospitalization and reoperation.  The patient stated understanding and desired to proceed.  All questions were answered.     Procedure: She was taken to the operating room where epidural anesthesia was found to be adequate.  Ancef was given for infection prophylaxis.  She was prepped and draped in the dorsal supine position with a leftward tilt.  A Pfannenstiel skin incision was made with the scalpel and carried down to the fascia with the Bovie.  The fascia was incised and extended laterally with the Bovie.  The superior aspect of the fascia was grasped with the Kocher clamps.  The underlying rectus muscle was dissected off sharply with Salinas scissors.  In a similar fashion, the inferior aspect of the fascia was elevated with the Kocher clamps and the rectus and pyramidalis muscles were dissected off.  Hemostasis was achieved with the Bovie.  The rectus muscle was  in the midline down to the level of the pubic symphysis.  Preperitoneal fatty tissue was bluntly dissected to expose the peritoneum.  The peritoneum was found to be free of adherent bowel and entered sharply with scissors.  The peritoneal incision was  extended superiorly and inferiorly to the bladder reflection with good visualization of the bladder.  The Lonnie retractor was placed. The vesicouterine peritoneum identified, then opened with scissors and the bladder flap was developed.   The lower uterine segment was incised with a scalpel.  The amniotic sac was ruptured and clear was noted.  The uterine incision was extended bluntly with lateral and upward traction.  The fetus was in cephalic presentation.  The head was gently elevated out of the pelvis and gentle fundal pressure was applied once the head was brought to the incision.  The infant was delivered without difficulty.  The mouth and nose were suctioned with bulb suction.  The cord was clamped and cut.  The infant was handed off to waiting NICU and respiratory personnel.  IV Pitocin was initiated to facilitate uterine contractions.  The placenta was delivered intact with manual massage of the uterine fundus.  The inside of the uterus was gently wiped with a lap sponge to assure complete removal of placental membranes.  The uterine incision was closed with a 0 Vicryl suture in a running locked fashion.  Blood clots and fluid were wiped out of the abdomen and pelvis with moist lap sponges.  The uterine incision was reinspected and good hemostasis was noted. The Lonnie retractor was removed.   The peritoneum was closed in a running fashion. The rectus muscles were loosely reapproximated.  The fascia was closed with 1-0 Vicryl in a continuous running fashion.  The subcuticular tissue was irrigated with warm normal saline.  Subcuticular tissue was reapproximated with plain gut.   Skin was closed using Monocryl in a subcuticular fashion.  The patient tolerated the procedure well.  All sponge and lap counts were correct times 2.  The patient was taken to the recovery room in stable condition.   Yes

## 2024-04-08 NOTE — DIETITIAN INITIAL EVALUATION ADULT - REASON FOR ADMISSION
HPI:  89 y/o female with PMH of TIA, Afib on eliquis, dementia, UTI who presented to ED to be evaluated for AMS. pt lives home with her family and gets around by means of walker. about 20 days ago pt noted to have visual hallucination. per daughter pt saw little girl in room. pt was taken to her PCP as UTI had led to such symptoms in past. pt was placed on 10 days of keflex with improvement. pt had F/u appt with her PCP about 1 week ago where everything including urine cx was reported to be normal. in last 2 days pt became more confused. trying to get out of bed at night on her own and starts walking w/o walker. per daughter pt have been more weak and lethargy during day time. pt has home health visiting 6 days/week only during daytime. daughter thinks that pt is still having UTI and was BIBA.  pt seems to ne pleasantly confused upon my initial evaluation. currently A and O X 3. pt does not recal any visual or auditory hallucination pt is not sure why she is here  denies any abdominal pain, nausea,vomiting, fever,chills or any urinary symptoms     pt found to have UTI in ED for which she is to be admitted       (06 Apr 2024 02:16)

## 2024-04-08 NOTE — SWALLOW BEDSIDE ASSESSMENT ADULT - SLP PERTINENT HISTORY OF CURRENT PROBLEM
Per EMR, pt had bedside swallow eval 11/20/22 rx easy to chew with mildly thick liquids, see report for details.

## 2024-04-08 NOTE — DIETITIAN INITIAL EVALUATION ADULT - PERTINENT MEDS FT
MEDICATIONS  (STANDING):  apixaban 2.5 milliGRAM(s) Oral two times a day  atorvastatin 20 milliGRAM(s) Oral at bedtime  cefTRIAXone   IVPB 1000 milliGRAM(s) IV Intermittent every 24 hours  citalopram 20 milliGRAM(s) Oral daily  cyanocobalamin 1000 MICROGram(s) Oral daily  metoprolol succinate ER 25 milliGRAM(s) Oral daily  potassium chloride   Solution 40 milliEquivalent(s) Oral once  sodium chloride 0.45% with potassium chloride 20 mEq/L 1000 milliLiter(s) (75 mL/Hr) IV Continuous <Continuous>    MEDICATIONS  (PRN):  acetaminophen     Tablet .. 650 milliGRAM(s) Oral every 6 hours PRN Temp greater or equal to 38C (100.4F), Mild Pain (1 - 3)  aluminum hydroxide/magnesium hydroxide/simethicone Suspension 30 milliLiter(s) Oral every 4 hours PRN Dyspepsia  melatonin 3 milliGRAM(s) Oral at bedtime PRN Insomnia  ondansetron Injectable 4 milliGRAM(s) IV Push every 8 hours PRN Nausea and/or Vomiting

## 2024-04-08 NOTE — SWALLOW BEDSIDE ASSESSMENT ADULT - COMMENTS
Chart reviewed order received for swallow eval.  Pt received in bed, A&A Ox3 grossly, positioned upright, on RA, SpO2 96%, suspect reduced cognition, pain scale 0/10 pre & post eval.  Swallow eval completed see below for details.  Pt left as received NAD JOELLEN Christopher & Dr. Ventura notified.  Will follow.    Per charting, pt is a "89 y/o female presented to ED due to AMS found to have UTI:"    Chest xray 4/6/24: "Left retrocardiac opacity/atelectasis."

## 2024-04-08 NOTE — CASE MANAGEMENT PROGRESS NOTE - NSCMPROGRESSNOTE_GEN_ALL_CORE
CM received a call from Pt'  [facility] [employer] [facility] Melissa Rea CM [phone] Fax [phone] Asked us to send d/c instructions when available.    States pt. was just approved on 4/5/24 for live in 24 hour HHA and daughter was informed by them today.  Pt. [facility]HA PTA were 45 hr week M-F * hr, Sat 5 hrs with [facility] of  on [address] [place] [address]  [phone].   States changing to 24 hour HHA will take longer to reinstate than the usual 24 hour notice.    Ms Rea states pt. was at [facility] in February and had [facility] services after that.     [place]icaid [id number]  Informed her family is opting for [facility] services and is working with  team.  CM available if needed..

## 2024-04-08 NOTE — CARE COORDINATION ASSESSMENT. - NSPASTMEDSURGHISTORY_GEN_ALL_CORE_FT
End of shift report given to Martin Velasquez RN at bedside. Transfer of care done at this time. PAST MEDICAL & SURGICAL HISTORY:  Bladder disorder  dropped bladder      H/O hernia repair      Depression      Palpitation      History of cardiac aneurysm      Multiple falls      TIA (transient ischemic attack)      History of sinus tachycardia      Dementia      Atrial fibrillation

## 2024-04-08 NOTE — DIETITIAN INITIAL EVALUATION ADULT - PERTINENT LABORATORY DATA
04-08    146<H>  |  112<H>  |  7   ----------------------------<  77  3.2<L>   |  24  |  0.57    Ca    8.4      08 Apr 2024 06:00    TPro  5.4<L>  /  Alb  2.5<L>  /  TBili  0.5  /  DBili  x   /  AST  15  /  ALT  11  /  AlkPhos  58  04-08

## 2024-04-08 NOTE — SWALLOW BEDSIDE ASSESSMENT ADULT - SLP GENERAL OBSERVATIONS
Pt received in bed, A&A Ox3 grossly, positioned upright, on RA, SpO2 96%, suspect reduced cognition, pain scale 0/10 pre & post eval

## 2024-04-08 NOTE — CONSULT NOTE ADULT - SUBJECTIVE AND OBJECTIVE BOX
[facility], Division of Infectious Diseases  CHRISTY Tavarez S. Shah, Y. Patel, G. [facility]  [phone]    JEN REICH  89y, Female  [id number]    HPI--  HPI:  89 y/o female with PMH of TIA, Afib on eliquis, dementia, UTI who presented to ED to be evaluated for AMS. pt lives home with her family and gets around by means of walker. about 20 days ago pt noted to have visual hallucination. per daughter pt saw little girl in room. pt was taken to her PCP as UTI had led to such symptoms in past. pt was placed on 10 days of keflex with improvement. pt had F/u appt with her PCP about 1 week ago where everything including urine cx was reported to be normal. in last 2 days pt became more confused. trying to get out of bed at night on her own and starts walking w/o walker. per daughter pt have been more weak and lethargy during day time. pt has home health visiting 6 days/week only during daytime. daughter thinks that pt is still having UTI and was BIBA.  pt seems to ne pleasantly confused upon my initial evaluation. currently A and O X 3. pt does not recal any visual or auditory hallucination pt is not sure why she is here  denies any abdominal pain, nausea,vomiting, fever,chills or any urinary symptoms     pt found to have UTI in ED for which she is to be admitted       (06 Apr 2024 02:16)    Hx obtained from chart review as above    Active Medications--  acetaminophen     Tablet .. 650 milliGRAM(s) Oral every 6 hours PRN  aluminum hydroxide/magnesium hydroxide/simethicone Suspension 30 milliLiter(s) Oral every 4 hours PRN  apixaban 2.5 milliGRAM(s) Oral two times a day  atorvastatin 20 milliGRAM(s) Oral at bedtime  cefTRIAXone   IVPB 1000 milliGRAM(s) IV Intermittent every 24 hours  citalopram 20 milliGRAM(s) Oral daily  cyanocobalamin 1000 MICROGram(s) Oral daily  melatonin 3 milliGRAM(s) Oral at bedtime PRN  metoprolol succinate ER 25 milliGRAM(s) Oral daily  ondansetron Injectable 4 milliGRAM(s) IV Push every 8 hours PRN  sodium chloride 0.9%. 1000 milliLiter(s) IV Continuous <Continuous>    Antimicrobials:   cefTRIAXone   IVPB 1000 milliGRAM(s) IV Intermittent every 24 hours    Immunologic:     ROS:  unable to obtain    Allergies: No Known Allergies    PMH -- No pertinent past medical history    Stroke    Heart rate fast    Heart rate fast    Bladder disorder    Palpitation    Depression    History of sinus tachycardia    TIA (transient ischemic attack)    Multiple falls    History of cardiac aneurysm    Atrial fibrillation    Dementia      PSH -- H/O hernia repair      FH -- No pertinent family history in first degree relatives    FH: heart disease (Sibling)      Social History --  EtOH: denies   Tobacco: denies   Drug Use: denies     Travel/Environmental/Occupational History:    Physical Exam--  Vital Signs Last 24 Hrs  T(F): 98.2 (08 Apr 2024 07:23), Max: 98.2 (08 Apr 2024 07:23)  HR: 92 (08 Apr 2024 06:00) (62 - 105)  BP: 150/74 (08 Apr 2024 06:00) (101/53 - 150/74)  RR: 14 (08 Apr 2024 06:00) (14 - 27)  SpO2: 96% (08 Apr 2024 06:00) (79% - 97%)  General: nontoxic-appearing, no acute distress  HEENT: NC/AT,  Lungs: decreased breath sounds  Heart: RRR  Abdomen: Soft. NTND  Extremities: No cyanosis or clubbing. No edema.   Skin: Warm. Dry.     Laboratory & Imaging Data:  CBC:                       11.6   6.22  )-----------( 201      ( 08 Apr 2024 06:00 )             36.1     CMP: 04-08    146<H>  |  112<H>  |  7   ----------------------------<  77  3.2<L>   |  24  |  0.57    Ca    8.4      08 Apr 2024 06:00    TPro  5.4<L>  /  Alb  2.5<L>  /  TBili  0.5  /  DBili  x   /  AST  15  /  ALT  11  /  AlkPhos  58  04-08    LIVER FUNCTIONS - ( 08 Apr 2024 06:00 )  Alb: 2.5 g/dL / Pro: 5.4 g/dL / ALK PHOS: 58 U/L / ALT: 11 U/L / AST: 15 U/L / GGT: x           Urinalysis Basic - ( 08 Apr 2024 06:00 )    Color: x / Appearance: x / SG: x / pH: x  Gluc: 77 mg/dL / Ketone: x  / Bili: x / Urobili: x   Blood: x / Protein: x / Nitrite: x   Leuk Esterase: x / RBC: x / WBC x   Sq Epi: x / Non Sq Epi: x / Bacteria: x        Microbiology: reviewed    Culture - Blood (collected 04-06-24 @ 01:05)  Source: .Blood Blood-Peripheral  Preliminary Report (04-07-24 @ 13:02):    No growth at 24 hours    Culture - Blood (collected 04-06-24 @ 01:05)  Source: .Blood Blood-Peripheral  Preliminary Report (04-07-24 @ 13:02):    No growth at 24 hours    Culture - Urine (collected 04-06-24 @ 01:05)  Source: Clean Catch Clean Catch (Midstream)  Preliminary Report (04-07-24 @ 12:19):    >100,000 CFU/ml Escherichia coli    <10,000 CFU/ml Normal Urogenital grant present          Radiology: reviewed

## 2024-04-08 NOTE — CARE COORDINATION ASSESSMENT. - NSCAREPROVIDERS_GEN_ALL_CORE_FT
CARE PROVIDERS:  Accepting Physician: Joe Guillen  Administration: Teressa Elena  Administration: Bita Troy  Administration: Randy Covington  Admitting: Joe Guillen  Attending: Joe Guillen  Case Management: Sara Machado  Consultant: Gloria Gallegos  ED Attending: Jasmin Lyons  ED Nurse: Smiley Sharp  Infection Control: Maria Antonia Comer  Nurse: Ashwin Luis  Nurse: Thelma Garcia  Nurse: Janae Santiago  Nurse: Lara Randall  Ordered: ServiceAccount, SCMMLM  Override: Smiley Sharp  PCA/Nursing Assistant: Jessica Tanner  PCA/Nursing Assistant: Laverne Robert  Physical Therapy: Juan David Gastelum  Primary Team: Robert Olea  Primary Team: Karen Alva  Quality Review: Cris Judge  Registered Dietitian: Billie Payton  Respiratory Therapy: Octaviano Prater  : Ken Burnett  Team: ADAMA  Hospitalists, Team  Technician: Rosa Epstein

## 2024-04-08 NOTE — SWALLOW BEDSIDE ASSESSMENT ADULT - SWALLOW EVAL: DIAGNOSIS
Oral stage WFL for puree, mildly thick and thin liquids.  Mild-moderate oral dysphagia with minced & moist and soft & bite sized marked by prolonged mastication and increased oral transit times, suspected posterior loss.  Pharyngeal stage deemed functional, no overt s/s penetration/aspiration noted across all administered consistencies.  If silent aspiration is of concern, given recent chest imaging, consider MBS for objective view of pharyngeal stage to rule out silent aspiration.

## 2024-04-09 LAB
-  AMOXICILLIN/CLAVULANIC ACID: SIGNIFICANT CHANGE UP
-  AMPICILLIN/SULBACTAM: SIGNIFICANT CHANGE UP
-  AMPICILLIN: SIGNIFICANT CHANGE UP
-  AZTREONAM: SIGNIFICANT CHANGE UP
-  CEFAZOLIN: SIGNIFICANT CHANGE UP
-  CEFEPIME: SIGNIFICANT CHANGE UP
-  CEFOXITIN: SIGNIFICANT CHANGE UP
-  CEFTRIAXONE: SIGNIFICANT CHANGE UP
-  CEFUROXIME: SIGNIFICANT CHANGE UP
-  CIPROFLOXACIN: SIGNIFICANT CHANGE UP
-  ERTAPENEM: SIGNIFICANT CHANGE UP
-  GENTAMICIN: SIGNIFICANT CHANGE UP
-  IMIPENEM: SIGNIFICANT CHANGE UP
-  LEVOFLOXACIN: SIGNIFICANT CHANGE UP
-  MEROPENEM: SIGNIFICANT CHANGE UP
-  NITROFURANTOIN: SIGNIFICANT CHANGE UP
-  PIPERACILLIN/TAZOBACTAM: SIGNIFICANT CHANGE UP
-  TOBRAMYCIN: SIGNIFICANT CHANGE UP
-  TRIMETHOPRIM/SULFAMETHOXAZOLE: SIGNIFICANT CHANGE UP
ANION GAP SERPL CALC-SCNC: 9 MMOL/L — SIGNIFICANT CHANGE UP (ref 5–17)
BUN SERPL-MCNC: 5 MG/DL — LOW (ref 7–23)
CALCIUM SERPL-MCNC: 8.4 MG/DL — SIGNIFICANT CHANGE UP (ref 8.4–10.5)
CHLORIDE SERPL-SCNC: 108 MMOL/L — SIGNIFICANT CHANGE UP (ref 96–108)
CO2 SERPL-SCNC: 26 MMOL/L — SIGNIFICANT CHANGE UP (ref 22–31)
CREAT SERPL-MCNC: 0.54 MG/DL — SIGNIFICANT CHANGE UP (ref 0.5–1.3)
CULTURE RESULTS: ABNORMAL
EGFR: 86 ML/MIN/1.73M2 — SIGNIFICANT CHANGE UP
GLUCOSE SERPL-MCNC: 79 MG/DL — SIGNIFICANT CHANGE UP (ref 70–99)
HCT VFR BLD CALC: 34.7 % — SIGNIFICANT CHANGE UP (ref 34.5–45)
HGB BLD-MCNC: 11.4 G/DL — LOW (ref 11.5–15.5)
MAGNESIUM SERPL-MCNC: 1.7 MG/DL — SIGNIFICANT CHANGE UP (ref 1.6–2.6)
MCHC RBC-ENTMCNC: 29.8 PG — SIGNIFICANT CHANGE UP (ref 27–34)
MCHC RBC-ENTMCNC: 32.9 GM/DL — SIGNIFICANT CHANGE UP (ref 32–36)
MCV RBC AUTO: 90.8 FL — SIGNIFICANT CHANGE UP (ref 80–100)
METHOD TYPE: SIGNIFICANT CHANGE UP
NRBC # BLD: 0 /100 WBCS — SIGNIFICANT CHANGE UP (ref 0–0)
ORGANISM # SPEC MICROSCOPIC CNT: ABNORMAL
ORGANISM # SPEC MICROSCOPIC CNT: ABNORMAL
PHOSPHATE SERPL-MCNC: 2.9 MG/DL — SIGNIFICANT CHANGE UP (ref 2.5–4.5)
PLATELET # BLD AUTO: 199 K/UL — SIGNIFICANT CHANGE UP (ref 150–400)
POTASSIUM SERPL-MCNC: 3.6 MMOL/L — SIGNIFICANT CHANGE UP (ref 3.5–5.3)
POTASSIUM SERPL-SCNC: 3.6 MMOL/L — SIGNIFICANT CHANGE UP (ref 3.5–5.3)
RBC # BLD: 3.82 M/UL — SIGNIFICANT CHANGE UP (ref 3.8–5.2)
RBC # FLD: 13.5 % — SIGNIFICANT CHANGE UP (ref 10.3–14.5)
SODIUM SERPL-SCNC: 143 MMOL/L — SIGNIFICANT CHANGE UP (ref 135–145)
SPECIMEN SOURCE: SIGNIFICANT CHANGE UP
WBC # BLD: 6.12 K/UL — SIGNIFICANT CHANGE UP (ref 3.8–10.5)
WBC # FLD AUTO: 6.12 K/UL — SIGNIFICANT CHANGE UP (ref 3.8–10.5)

## 2024-04-09 PROCEDURE — 99232 SBSQ HOSP IP/OBS MODERATE 35: CPT

## 2024-04-09 RX ORDER — AMLODIPINE BESYLATE 2.5 MG/1
5 TABLET ORAL DAILY
Refills: 0 | Status: DISCONTINUED | OUTPATIENT
Start: 2024-04-09 | End: 2024-04-20

## 2024-04-09 RX ADMIN — CEFTRIAXONE 100 MILLIGRAM(S): 500 INJECTION, POWDER, FOR SOLUTION INTRAMUSCULAR; INTRAVENOUS at 00:07

## 2024-04-09 RX ADMIN — APIXABAN 2.5 MILLIGRAM(S): 2.5 TABLET, FILM COATED ORAL at 05:46

## 2024-04-09 RX ADMIN — ATORVASTATIN CALCIUM 20 MILLIGRAM(S): 80 TABLET, FILM COATED ORAL at 21:40

## 2024-04-09 RX ADMIN — Medication 25 MILLIGRAM(S): at 05:46

## 2024-04-09 RX ADMIN — PREGABALIN 1000 MICROGRAM(S): 225 CAPSULE ORAL at 11:23

## 2024-04-09 RX ADMIN — CEFTRIAXONE 100 MILLIGRAM(S): 500 INJECTION, POWDER, FOR SOLUTION INTRAMUSCULAR; INTRAVENOUS at 23:57

## 2024-04-09 RX ADMIN — CITALOPRAM 20 MILLIGRAM(S): 10 TABLET, FILM COATED ORAL at 11:23

## 2024-04-09 RX ADMIN — APIXABAN 2.5 MILLIGRAM(S): 2.5 TABLET, FILM COATED ORAL at 18:10

## 2024-04-09 NOTE — SOCIAL WORK PROGRESS NOTE - NSSWPROGRESSNOTE_GEN_ALL_CORE
Per dtr Marjan, the pt was recently dc from [facility]. SW contacted Lucy Campbell of [facility] who reported that the pt was dc on 2/16/24 and has no rehab days left. Pt's dtr and  were made aware. Pt is scheduled for dc home with home care and will resume [facility] aides when stable. Pt remains acute.

## 2024-04-09 NOTE — CASE MANAGEMENT PROGRESS NOTE - NSCMPROGRESSNOTE_GEN_ALL_CORE
Update Communication Note:  Patient doesn't have any days left for rehab. Patient has [employer] will need 24 hrs to restart services [phone] option 7 for customer services personal touch JOELLEN Yu [phone] ext [phone] the provide 45 hrs a week fax dc summary to [phone]. Will continue to follow patient.

## 2024-04-09 NOTE — CASE MANAGEMENT PROGRESS NOTE - NSCMPROGRESSNOTE_GEN_ALL_CORE
Update Communication Note: As per morning rounds on Special Care Unit,  DX: UTI on IV Rocephin, pending Sensitivity, Pending RADHA. Will continue to follow case.

## 2024-04-10 LAB
ANION GAP SERPL CALC-SCNC: 10 MMOL/L — SIGNIFICANT CHANGE UP (ref 5–17)
BUN SERPL-MCNC: 5 MG/DL — LOW (ref 7–23)
CALCIUM SERPL-MCNC: 8.9 MG/DL — SIGNIFICANT CHANGE UP (ref 8.4–10.5)
CHLORIDE SERPL-SCNC: 110 MMOL/L — HIGH (ref 96–108)
CO2 SERPL-SCNC: 26 MMOL/L — SIGNIFICANT CHANGE UP (ref 22–31)
CREAT SERPL-MCNC: 0.55 MG/DL — SIGNIFICANT CHANGE UP (ref 0.5–1.3)
EGFR: 86 ML/MIN/1.73M2 — SIGNIFICANT CHANGE UP
GLUCOSE SERPL-MCNC: 81 MG/DL — SIGNIFICANT CHANGE UP (ref 70–99)
HCT VFR BLD CALC: 37.1 % — SIGNIFICANT CHANGE UP (ref 34.5–45)
HGB BLD-MCNC: 12.4 G/DL — SIGNIFICANT CHANGE UP (ref 11.5–15.5)
MAGNESIUM SERPL-MCNC: 1.5 MG/DL — LOW (ref 1.6–2.6)
MCHC RBC-ENTMCNC: 30.3 PG — SIGNIFICANT CHANGE UP (ref 27–34)
MCHC RBC-ENTMCNC: 33.4 GM/DL — SIGNIFICANT CHANGE UP (ref 32–36)
MCV RBC AUTO: 90.7 FL — SIGNIFICANT CHANGE UP (ref 80–100)
NRBC # BLD: 0 /100 WBCS — SIGNIFICANT CHANGE UP (ref 0–0)
PHOSPHATE SERPL-MCNC: 3.2 MG/DL — SIGNIFICANT CHANGE UP (ref 2.5–4.5)
PLATELET # BLD AUTO: 216 K/UL — SIGNIFICANT CHANGE UP (ref 150–400)
POTASSIUM SERPL-MCNC: 3.8 MMOL/L — SIGNIFICANT CHANGE UP (ref 3.5–5.3)
POTASSIUM SERPL-SCNC: 3.8 MMOL/L — SIGNIFICANT CHANGE UP (ref 3.5–5.3)
RBC # BLD: 4.09 M/UL — SIGNIFICANT CHANGE UP (ref 3.8–5.2)
RBC # FLD: 13.5 % — SIGNIFICANT CHANGE UP (ref 10.3–14.5)
SODIUM SERPL-SCNC: 146 MMOL/L — HIGH (ref 135–145)
WBC # BLD: 7.77 K/UL — SIGNIFICANT CHANGE UP (ref 3.8–10.5)
WBC # FLD AUTO: 7.77 K/UL — SIGNIFICANT CHANGE UP (ref 3.8–10.5)

## 2024-04-10 PROCEDURE — 99233 SBSQ HOSP IP/OBS HIGH 50: CPT

## 2024-04-10 RX ORDER — MAGNESIUM SULFATE 500 MG/ML
2 VIAL (ML) INJECTION ONCE
Refills: 0 | Status: COMPLETED | OUTPATIENT
Start: 2024-04-10 | End: 2024-04-10

## 2024-04-10 RX ADMIN — CEFTRIAXONE 100 MILLIGRAM(S): 500 INJECTION, POWDER, FOR SOLUTION INTRAMUSCULAR; INTRAVENOUS at 23:53

## 2024-04-10 RX ADMIN — Medication 25 GRAM(S): at 11:57

## 2024-04-10 RX ADMIN — PREGABALIN 1000 MICROGRAM(S): 225 CAPSULE ORAL at 11:57

## 2024-04-10 RX ADMIN — DEXTROSE MONOHYDRATE, SODIUM CHLORIDE, AND POTASSIUM CHLORIDE 75 MILLILITER(S): 50; .745; 4.5 INJECTION, SOLUTION INTRAVENOUS at 18:20

## 2024-04-10 RX ADMIN — APIXABAN 2.5 MILLIGRAM(S): 2.5 TABLET, FILM COATED ORAL at 17:02

## 2024-04-10 RX ADMIN — CITALOPRAM 20 MILLIGRAM(S): 10 TABLET, FILM COATED ORAL at 11:57

## 2024-04-10 RX ADMIN — ATORVASTATIN CALCIUM 20 MILLIGRAM(S): 80 TABLET, FILM COATED ORAL at 22:03

## 2024-04-10 RX ADMIN — DEXTROSE MONOHYDRATE, SODIUM CHLORIDE, AND POTASSIUM CHLORIDE 75 MILLILITER(S): 50; .745; 4.5 INJECTION, SOLUTION INTRAVENOUS at 07:21

## 2024-04-10 RX ADMIN — Medication 3 MILLIGRAM(S): at 01:57

## 2024-04-10 NOTE — CASE MANAGEMENT PROGRESS NOTE - NSCMPROGRESSNOTE_GEN_ALL_CORE
Per treatment team morning rounds and speaking with Dr. Willis :   Pt. is pending final cultures and possible ready for transition on 4/12/24.  Pt. is still being treated for Cystitis / UTI and  on IVAB Ceftriaxone, Mg 1.5, HR and BP  monitored.    Pt. has stage 2 on Buttocks.  Hx falls .  Seen by PT/OT recommending RADHA and needs 2 person max assist.  Pt. has no RADHA days left.  Will need to go home with family daughter Marjan.   May need additional DME - Familia lift.    CM called [facility] to give 48 hour notice to resume HHA services.  [facility] Melissa Rea  [phone] Fax [phone] Asked us to send d/c instructions when available and notify them if plans change.  (Ms Rea is off Friday - will need to call [phone]).  States she mis-spoke last time we spoke and pt. was only approved for 24 hr HHA temporary because daughter was going away that week.  She still has the 45 hours a week of HHA that she will resume for Friday 4/12/24.      CM contacted daughter several times today to discuss transition planning  Marjan [phone]  States she is in a back brace and sees a chiropractor and will not be able to physically lift / assist patient with toileting turning getting up.  States pt. has a Commode, WC, RW, Grab bars, shower seat and a lift recliner that she prefers to sleep on at home.  States she had Home care PT with [facility] [phone] that will need to be resumed.    Dtr had several medical questions about pt. BP, HR and UTI and Mobility status.  CM called Dr. Willis and asked him to contact daughter today and he statesd he will do so.  CM contacted PT supervisor and informed her  daughter is willing to come in tomorrow during PT session  between 12 - 2pm she will notify pt's Therapist to see pt. when family present.  Pt's daughter appreciative and in agreement.  Daughter states pt's son in law may be able to help when he is home with transfers.  CM suggested she consider privative hiring help.     Daughter states pt. will need ambulance transport home.  Pt. has limited mobility a stage 2 area on buttock and confused at times/ forgetful.        Pt. HHA PTA were 45 hr week M-F 10-6pm , Sat 5 hrs  1-6 pm with [facility] on [address] [place] [address]  [phone].   States changing to 24 hour HHA will take longer to reinstate than the usual 24 hour notice.       pt. was at [facility] in February and had [facility] services after that.     [id number] P      [facility] of care requested soc 4/13/24.  CM available if needed. Per treatment team morning rounds and speaking with Dr. Willis :   Pt. is pending final cultures and possible ready for transition on 4/12/24.  Pt. is still being treated for Cystitis / UTI and  on IVAB Ceftriaxone, Mg 1.5, HR and BP  monitored.    Pt. has stage 2 on Buttocks.  Hx falls .  Seen by PT/OT recommending RADHA and needs 2 person max assist.  Pt. has no RADHA days left.  Will need to go home with family daughter Marjan.   May need additional DME - Familia lift.    CM called [facility] to give 48 hour notice to resume HHA services.  [facility] Melissa Rea  [phone] Fax [phone] Asked us to send d/c instructions when available and notify them if plans change.  (Ms Rea is off Friday - will need to call [phone]).  States she mis-spoke last time we spoke and pt. was only approved for 24 hr HHA temporary because daughter was going away that week.  She still has the 45 hours a week of HHA that she will resume for Friday 4/12/24.      CM contacted daughter several times today to discuss transition planning  Marjan [phone]  States she is in a back brace and sees a chiropractor and will not be able to physically lift / assist patient with toileting turning getting up.  States pt. has a Commode, WC, RW, Grab bars, shower seat and a lift recliner that she prefers to sleep on at home.  States she had Home care PT with [facility] [phone] that will need to be resumed.    Dtr had several medical questions about pt. BP, HR and UTI and Mobility status.  CM called Dr. Willis and asked him to contact daughter today and he statesd he will do so.  CM contacted PT supervisor and informed her  daughter is willing to come in tomorrow during PT session  between 12 - 2pm she will notify pt's Therapist to see pt. when family present.  Pt's daughter appreciative and in agreement.  Daughter states pt's son in law may be able to help when he is home with transfers.  CM suggested she consider privative hiring help.     Daughter states pt. will need ambulance transport home she has 2 steps to enter home.  Pt. has limited mobility a stage 2 area on buttock and confused at times/ forgetful.        Pt. HHA PTA were 45 hr week M-F 10-6pm , Sat 5 hrs  1-6 pm [facility] on [address] [place] [address]  [phone].   CM called spoke to [facility] she will notify pt's team JOELLEN Yu and CM Lucina that planning for Resumption of HHA on Friday and that This CM notified [facility]  FRED yanez Ms. Rea.        pt. was at [facility] in February and had [facility] services after that.     [place] [id number] P      CM referred pt. to [facility] home care PT Resumption of care requested soc 4/13/24.  CM available if needed. Per treatment team morning rounds and speaking with Dr. Willis :   Pt. is pending final cultures and possible ready for transition on 4/12/24.  Pt. is still being treated for Cystitis / UTI and  on IVAB Ceftriaxone, Mg 1.5, HR and BP  monitored.    Pt. has stage 2 on Buttocks.  Hx falls .  Seen by PT/OT recommending RADHA and needs 2 person max assist.  Pt. has no RADHA days left.  Will need to go home with family daughter Marjan.   May need additional DME - Familia lift.    CM called [facility] to give 48 hour notice to resume HHA services.  [facility] Melissa Rea  [phone] Fax [phone] Asked us to send d/c instructions when available and notify them if plans change.  (Ms Rea is off Friday - will need to call [phone]).  States she mis-spoke last time we spoke and pt. was only approved for 24 hr HHA temporary because daughter was going away that week.  She still has the 45 hours a week of HHA that she will resume for Friday 4/12/24.      CM contacted daughter several times today to discuss transition planning  Marjan [phone]  States she is in a back brace and sees a chiropractor and will not be able to physically lift / assist patient with toileting turning getting up.  States pt. has a Commode, WC, RW, Grab bars, shower seat and a lift recliner that she prefers to sleep on at home.  States she [facility] [phone] that will need to be resumed.    Dtr had several medical questions about pt. BP, HR and UTI and Mobility status.  CM called Dr. Willis and asked him to contact daughter today and he statesd he will do so.  CM contacted PT supervisor and informed her  daughter is willing to come in tomorrow during PT session  between 12 - 2pm she will notify pt's Therapist to see pt. when family present.  Pt's daughter appreciative and in agreement.  Daughter states pt's son in law may be able to help when he is home with transfers.  CM suggested she consider privative hiring help.     Daughter states pt. will need ambulance transport home she has 2 steps to enter home.  Pt. has limited mobility a stage 2 area on buttock and confused at times/ forgetful.        Pt. HHA PTA were 45 hr week M-F 10-6pm , Sat 5 hrs  1-6 pm [facility] on [address] [place] [address]  [phone].   CM called spoke to [facility] states she will notify pt's team JOELLEN Yu and CM Lucina that planning for Resumption of HHA on Friday and that This CM notified [facility]  FRED yanez Ms. eRa.        pt. was at [facility] in February and had [facility] services after that.     [id number] P    Dr. Galileo Owen [phone] last seen 3/26/24.  Dtr states she will make an appointment for within a week of home - maybe a tele visit.    Pharmacy [address] [phone]    CM referred pt. to [facility] PT Resumption of care requested soc 4/13/24.  CM available if needed. Per treatment team morning rounds and speaking with Dr. Willis :   Pt. is pending final cultures and possible ready for transition on 4/12/24.  Pt. is still being treated for Cystitis / UTI and  on IVAB Ceftriaxone, Mg 1.5, HR and BP  monitored.    Pt. has skin tear  on Buttock.  Hx falls .  Seen by PT/OT recommending RADHA and needs 2 person max assist.  Pt. has no RADHA days left.  Will need to go home with family daughter Marjan.   May need additional DME - Familia lift.    CM called [facility] to give 48 hour notice to resume HHA services.  [facility] Melissa Rea  [phone] Fax [phone] Asked us to send d/c instructions when available and notify them if plans change.  (Ms Rea is off Friday - will need to call [phone]).  States she mis-spoke last time we spoke and pt. was only approved for 24 hr HHA temporary because daughter was going away that week.  She still has the 45 hours a week of HHA that she will resume for Friday 4/12/24.      CM contacted daughter several times today to discuss transition planning  Marjan [phone]  States she is in a back brace and sees a chiropractor and will not be able to physically lift / assist patient with toileting turning getting up.  States pt. has a Commode, WC, RW, Grab bars, shower seat and a lift recliner that she prefers to sleep on at home.  States she [facility] [phone] that will need to be resumed.    Dtr had several medical questions about pt. BP, HR and UTI and Mobility status.  CM called Dr. Willis and asked him to contact daughter today and he statesd he will do so.  CM contacted PT supervisor and informed her  daughter is willing to come in tomorrow during PT session  between 12 - 2pm she will notify pt's Therapist to see pt. when family present.  Pt's daughter appreciative and in agreement.  Daughter states pt's son in law may be able to help when he is home with transfers.  CM suggested she consider privative hiring help.     Daughter states pt. will need ambulance transport home she has 2 steps to enter home.  Pt. has limited mobility a   skin tear area on  right buttock and confused at times/ forgetful.        Pt. HHA PTA were 45 hr week M-F 10-6pm , Sat 5 hrs  1-6 pm [facility] on [address] [place] [address]  [phone].   CM called spoke to [facility] states she will notify pt's team JOELLEN Yu and KAYKAY Verdugo that planning for Resumption of HHA on Friday and that This CM notified [facility]  FRED yanez Ms. Rea.        pt. was at [facility] in February and had [facility] services after that.     [place] [id number] P    Dr. Galileo Owen [phone] last seen 3/26/24.  Dtr states she will make an appointment for within a week of home - maybe a tele visit.    Pharmacy [address] [phone]    CM referred pt. to [facility] PT Resumption of care requested soc 4/13/24.  CM available if needed.

## 2024-04-10 NOTE — PATIENT CHOICE NOTE. - NSPTCHOICESTATE_GEN_ALL_CORE
I have met with the patient and/or caregiver to discuss discharge goals and treatment plan. Patient and/or caregiver also provided with instructions on accessing the CMS Compare websites for additional information related to Post Acute Provider quality and resource use measures to assist them in evaluation of the providers and in selecting their post-acute provider of choice. Patient and caregiver were informed of the facilities that are owned and/or operated by [facility]. I have discussed with the patient the availability of in-network facilities and providers. Patient and caregiver provided with a list of post-acute providers whose services are appropriate to the discharge plans and patient needs.     For patient requiring durable medical equipment, patient and/or caregiver were informed that they have the right to request who provides the required equipment.
I have met with the patient and/or caregiver to discuss discharge goals and treatment plan. Patient and/or caregiver also provided with instructions on accessing the CMS Compare websites for additional information related to Post Acute Provider quality and resource use measures to assist them in evaluation of the providers and in selecting their post-acute provider of choice. Patient and caregiver were informed of the facilities that are owned and/or operated by [facility]. I have discussed with the patient the availability of in-network facilities and providers. Patient and caregiver provided with a list of post-acute providers whose services are appropriate to the discharge plans and patient needs.     For patient requiring durable medical equipment, patient and/or caregiver were informed that they have the right to request who provides the required equipment.

## 2024-04-10 NOTE — CAREGIVER ENGAGEMENT NOTE - CAREGIVER EDUCATION - TYPES DISCUSSED
Discharge plan/DME/Home Care Services/Insurance benefits/Post-acute care agency contact/Post-discharge escalation process/Transportation coordination/Transportation letter provided

## 2024-04-10 NOTE — CAREGIVER ENGAGEMENT NOTE - CAREGIVER EDUCATION NOTES - FREE TEXT
CM called spoke with daughter several times today to discuss transition planning.  Pt.'s daughter is agreeable to come in tomorrow to meet with PT to see functional status and determine what DME pt. may need for home.  Daughter declined [facility] list wants to resume [facility].  Daughter requesting Ambulance transport for home.  Informed she has no days left for RADHA.   She had medical questions and KAYKAY asked Dr. Willis to call daughter today.     CM following.  .

## 2024-04-11 LAB
ALBUMIN SERPL ELPH-MCNC: 3 G/DL — LOW (ref 3.3–5)
ALP SERPL-CCNC: 73 U/L — SIGNIFICANT CHANGE UP (ref 30–120)
ALT FLD-CCNC: 18 U/L — SIGNIFICANT CHANGE UP (ref 10–60)
ANION GAP SERPL CALC-SCNC: 8 MMOL/L — SIGNIFICANT CHANGE UP (ref 5–17)
ANION GAP SERPL CALC-SCNC: 9 MMOL/L — SIGNIFICANT CHANGE UP (ref 5–17)
AST SERPL-CCNC: 29 U/L — SIGNIFICANT CHANGE UP (ref 10–40)
BASOPHILS # BLD AUTO: 0.04 K/UL — SIGNIFICANT CHANGE UP (ref 0–0.2)
BASOPHILS NFR BLD AUTO: 0.5 % — SIGNIFICANT CHANGE UP (ref 0–2)
BILIRUB SERPL-MCNC: 0.6 MG/DL — SIGNIFICANT CHANGE UP (ref 0.2–1.2)
BUN SERPL-MCNC: 3 MG/DL — LOW (ref 7–23)
BUN SERPL-MCNC: 5 MG/DL — LOW (ref 7–23)
CALCIUM SERPL-MCNC: 9 MG/DL — SIGNIFICANT CHANGE UP (ref 8.4–10.5)
CALCIUM SERPL-MCNC: 9.1 MG/DL — SIGNIFICANT CHANGE UP (ref 8.4–10.5)
CHLORIDE SERPL-SCNC: 105 MMOL/L — SIGNIFICANT CHANGE UP (ref 96–108)
CHLORIDE SERPL-SCNC: 105 MMOL/L — SIGNIFICANT CHANGE UP (ref 96–108)
CO2 SERPL-SCNC: 25 MMOL/L — SIGNIFICANT CHANGE UP (ref 22–31)
CO2 SERPL-SCNC: 25 MMOL/L — SIGNIFICANT CHANGE UP (ref 22–31)
CREAT SERPL-MCNC: 0.47 MG/DL — LOW (ref 0.5–1.3)
CREAT SERPL-MCNC: 0.55 MG/DL — SIGNIFICANT CHANGE UP (ref 0.5–1.3)
CULTURE RESULTS: SIGNIFICANT CHANGE UP
CULTURE RESULTS: SIGNIFICANT CHANGE UP
EGFR: 86 ML/MIN/1.73M2 — SIGNIFICANT CHANGE UP
EGFR: 89 ML/MIN/1.73M2 — SIGNIFICANT CHANGE UP
EOSINOPHIL # BLD AUTO: 0.36 K/UL — SIGNIFICANT CHANGE UP (ref 0–0.5)
EOSINOPHIL NFR BLD AUTO: 4.7 % — SIGNIFICANT CHANGE UP (ref 0–6)
GLUCOSE BLDC GLUCOMTR-MCNC: 97 MG/DL — SIGNIFICANT CHANGE UP (ref 70–99)
GLUCOSE SERPL-MCNC: 114 MG/DL — HIGH (ref 70–99)
GLUCOSE SERPL-MCNC: 79 MG/DL — SIGNIFICANT CHANGE UP (ref 70–99)
HCT VFR BLD CALC: 38.2 % — SIGNIFICANT CHANGE UP (ref 34.5–45)
HCT VFR BLD CALC: 39.2 % — SIGNIFICANT CHANGE UP (ref 34.5–45)
HGB BLD-MCNC: 12.6 G/DL — SIGNIFICANT CHANGE UP (ref 11.5–15.5)
HGB BLD-MCNC: 12.7 G/DL — SIGNIFICANT CHANGE UP (ref 11.5–15.5)
IMM GRANULOCYTES NFR BLD AUTO: 0.4 % — SIGNIFICANT CHANGE UP (ref 0–0.9)
LYMPHOCYTES # BLD AUTO: 1.83 K/UL — SIGNIFICANT CHANGE UP (ref 1–3.3)
LYMPHOCYTES # BLD AUTO: 24.1 % — SIGNIFICANT CHANGE UP (ref 13–44)
MAGNESIUM SERPL-MCNC: 1.8 MG/DL — SIGNIFICANT CHANGE UP (ref 1.6–2.6)
MAGNESIUM SERPL-MCNC: 2 MG/DL — SIGNIFICANT CHANGE UP (ref 1.6–2.6)
MCHC RBC-ENTMCNC: 29.6 PG — SIGNIFICANT CHANGE UP (ref 27–34)
MCHC RBC-ENTMCNC: 30.3 PG — SIGNIFICANT CHANGE UP (ref 27–34)
MCHC RBC-ENTMCNC: 32.4 GM/DL — SIGNIFICANT CHANGE UP (ref 32–36)
MCHC RBC-ENTMCNC: 33 GM/DL — SIGNIFICANT CHANGE UP (ref 32–36)
MCV RBC AUTO: 91.4 FL — SIGNIFICANT CHANGE UP (ref 80–100)
MCV RBC AUTO: 91.8 FL — SIGNIFICANT CHANGE UP (ref 80–100)
MONOCYTES # BLD AUTO: 0.94 K/UL — HIGH (ref 0–0.9)
MONOCYTES NFR BLD AUTO: 12.4 % — SIGNIFICANT CHANGE UP (ref 2–14)
NEUTROPHILS # BLD AUTO: 4.4 K/UL — SIGNIFICANT CHANGE UP (ref 1.8–7.4)
NEUTROPHILS NFR BLD AUTO: 57.9 % — SIGNIFICANT CHANGE UP (ref 43–77)
NRBC # BLD: 0 /100 WBCS — SIGNIFICANT CHANGE UP (ref 0–0)
NRBC # BLD: 0 /100 WBCS — SIGNIFICANT CHANGE UP (ref 0–0)
PHOSPHATE SERPL-MCNC: 3.7 MG/DL — SIGNIFICANT CHANGE UP (ref 2.5–4.5)
PLATELET # BLD AUTO: 231 K/UL — SIGNIFICANT CHANGE UP (ref 150–400)
PLATELET # BLD AUTO: 233 K/UL — SIGNIFICANT CHANGE UP (ref 150–400)
POTASSIUM SERPL-MCNC: 4.1 MMOL/L — SIGNIFICANT CHANGE UP (ref 3.5–5.3)
POTASSIUM SERPL-MCNC: 4.1 MMOL/L — SIGNIFICANT CHANGE UP (ref 3.5–5.3)
POTASSIUM SERPL-SCNC: 4.1 MMOL/L — SIGNIFICANT CHANGE UP (ref 3.5–5.3)
POTASSIUM SERPL-SCNC: 4.1 MMOL/L — SIGNIFICANT CHANGE UP (ref 3.5–5.3)
PROT SERPL-MCNC: 6.2 G/DL — SIGNIFICANT CHANGE UP (ref 6–8.3)
RBC # BLD: 4.16 M/UL — SIGNIFICANT CHANGE UP (ref 3.8–5.2)
RBC # BLD: 4.29 M/UL — SIGNIFICANT CHANGE UP (ref 3.8–5.2)
RBC # FLD: 13.9 % — SIGNIFICANT CHANGE UP (ref 10.3–14.5)
RBC # FLD: 13.9 % — SIGNIFICANT CHANGE UP (ref 10.3–14.5)
SODIUM SERPL-SCNC: 138 MMOL/L — SIGNIFICANT CHANGE UP (ref 135–145)
SODIUM SERPL-SCNC: 139 MMOL/L — SIGNIFICANT CHANGE UP (ref 135–145)
SPECIMEN SOURCE: SIGNIFICANT CHANGE UP
SPECIMEN SOURCE: SIGNIFICANT CHANGE UP
TROPONIN I, HIGH SENSITIVITY RESULT: 8.1 NG/L — SIGNIFICANT CHANGE UP
TROPONIN I, HIGH SENSITIVITY RESULT: 8.5 NG/L — SIGNIFICANT CHANGE UP
WBC # BLD: 7.6 K/UL — SIGNIFICANT CHANGE UP (ref 3.8–10.5)
WBC # BLD: 9.17 K/UL — SIGNIFICANT CHANGE UP (ref 3.8–10.5)
WBC # FLD AUTO: 7.6 K/UL — SIGNIFICANT CHANGE UP (ref 3.8–10.5)
WBC # FLD AUTO: 9.17 K/UL — SIGNIFICANT CHANGE UP (ref 3.8–10.5)

## 2024-04-11 PROCEDURE — 99291 CRITICAL CARE FIRST HOUR: CPT

## 2024-04-11 PROCEDURE — 71045 X-RAY EXAM CHEST 1 VIEW: CPT | Mod: 26

## 2024-04-11 PROCEDURE — 93010 ELECTROCARDIOGRAM REPORT: CPT

## 2024-04-11 RX ORDER — PANTOPRAZOLE SODIUM 20 MG/1
40 TABLET, DELAYED RELEASE ORAL ONCE
Refills: 0 | Status: COMPLETED | OUTPATIENT
Start: 2024-04-11 | End: 2024-04-11

## 2024-04-11 RX ORDER — PANTOPRAZOLE SODIUM 20 MG/1
40 TABLET, DELAYED RELEASE ORAL
Refills: 0 | Status: DISCONTINUED | OUTPATIENT
Start: 2024-04-12 | End: 2024-04-13

## 2024-04-11 RX ORDER — ASPIRIN/CALCIUM CARB/MAGNESIUM 324 MG
81 TABLET ORAL ONCE
Refills: 0 | Status: COMPLETED | OUTPATIENT
Start: 2024-04-11 | End: 2024-04-11

## 2024-04-11 RX ADMIN — PREGABALIN 1000 MICROGRAM(S): 225 CAPSULE ORAL at 14:51

## 2024-04-11 RX ADMIN — APIXABAN 2.5 MILLIGRAM(S): 2.5 TABLET, FILM COATED ORAL at 18:42

## 2024-04-11 RX ADMIN — Medication 25 MILLIGRAM(S): at 05:27

## 2024-04-11 RX ADMIN — DEXTROSE MONOHYDRATE, SODIUM CHLORIDE, AND POTASSIUM CHLORIDE 75 MILLILITER(S): 50; .745; 4.5 INJECTION, SOLUTION INTRAVENOUS at 06:11

## 2024-04-11 RX ADMIN — APIXABAN 2.5 MILLIGRAM(S): 2.5 TABLET, FILM COATED ORAL at 05:27

## 2024-04-11 RX ADMIN — Medication 30 MILLILITER(S): at 14:32

## 2024-04-11 RX ADMIN — Medication 81 MILLIGRAM(S): at 14:23

## 2024-04-11 RX ADMIN — ATORVASTATIN CALCIUM 20 MILLIGRAM(S): 80 TABLET, FILM COATED ORAL at 21:24

## 2024-04-11 RX ADMIN — PANTOPRAZOLE SODIUM 40 MILLIGRAM(S): 20 TABLET, DELAYED RELEASE ORAL at 14:46

## 2024-04-11 RX ADMIN — CITALOPRAM 20 MILLIGRAM(S): 10 TABLET, FILM COATED ORAL at 14:50

## 2024-04-11 RX ADMIN — DEXTROSE MONOHYDRATE, SODIUM CHLORIDE, AND POTASSIUM CHLORIDE 75 MILLILITER(S): 50; .745; 4.5 INJECTION, SOLUTION INTRAVENOUS at 20:22

## 2024-04-11 RX ADMIN — AMLODIPINE BESYLATE 5 MILLIGRAM(S): 2.5 TABLET ORAL at 05:27

## 2024-04-11 NOTE — CASE MANAGEMENT PROGRESS NOTE - NSCMPROGRESSNOTE_GEN_ALL_CORE
DISCHARGE PLANNING UPDATE  Patient has used up all RADHA/ Rehab; not eligible for Rehab placement; Marjan, daughter aware of DC date of 04/12/2024; daughter Marjan stated she will assume care for patient; home care referral sent to [facility]; with advance notice of patient anticipated DC 04/12/2024;

## 2024-04-11 NOTE — CHART NOTE - NSCHARTNOTEFT_GEN_A_CORE
RRT called for chest pain     The pt reported mid sternal CP   Denied palpitations, nausea, arm or jaw pain, diaphoresis, back pain, fever, chills or cough    VS   /90, , afebrile, RR 20, SP02 92% on RA  In mild distress due to pain   Non reproducible chest ttp  Lungs clear  No back or flank pain  No calf swelling or edema    CP possibly GERD, MSK pain, r/o ACS   No prior cardiac hx   Obtain troponin, cbc, bmp, ekg, cxr, ASA 81mgx1, GI cocktail  Tele monitoring  Updated daughter present at bedside       Addendum:  Pt feels better  EKG with no ischemic changes  Troponin nl  Continue tele monitoring  Obtain 2nd set of troponin, if nl no need for further monitoring  Further cards work up can be considered OP, if family desires further work up    CCT 35mins

## 2024-04-11 NOTE — CHART NOTE - NSCHARTNOTEFT_GEN_A_CORE
Assessment: Per H&P, "89 y/o female with PMH of TIA, Afib on eliquis, dementia, UTI who presented to ED to be evaluated for AMS. pt lives home with her family and gets around by means of walker. about 20 days ago pt noted to have visual hallucination. per daughter pt saw little girl in room. pt was taken to her PCP as UTI had led to such symptoms in past. pt was placed on 10 days of keflex with improvement. pt had F/u appt with her PCP about 1 week ago where everything including urine cx was reported to be normal. in last 2 days pt became more confused. trying to get out of bed at night on her own and starts walking w/o walker. per daughter pt have been more weak and lethargy during day time. pt has home health visiting 6 days/week only during daytime. daughter thinks that pt is still having UTI and was BIBA. pt seems to ne pleasantly confused upon my initial evaluation. currently A and O X 3. pt does not recal any visual or auditory hallucination pt is not sure why she is here denies any abdominal pain, nausea,vomiting, fever,chills or any urinary symptoms. pt found to have UTI in ED for which she is to be admitted"    4/11  Pt seen for nutrition follow up.  Pt visited while eating lunch, dtr at bedside.  Pt seen for swallowing eval 4/8, recommendation made for pureed consistency with thin liquid.  PO intake variable, generally <75% of most meals; taking small bites/amounts of puree lunch entree.  Decreased po intake supplemented with ensure plus high protein bid (350kcal, 20g protein/8oz bottle), however, acceptance overall poor.  Dtr reports pt enjoys ice cream - agreeable for pt to trial, will provide magic cup fortified ice cream bid (290kcal, 9g protein/4oz cup).  Dtr assisting and providing encouragement at lunch.  Remains on 1/2NSKCl@75ml/hr.  RD to follow up and will continue to monitor pt's nutrition status.    Factors impacting intake: [ ] none [ ] nausea  [ ] vomiting [ ] diarrhea [ ] constipation  [x ]chewing problems [x ] swallowing issues  [ ] other:     Diet Prescription: Diet, Pureed:   Supplement Feeding Modality:  Oral  Ensure Plus High Protein Cans or Servings Per Day:  1       Frequency:  Daily (04-08-24 @ 12:04)    Intake: variable, poor to fair    Current Weight: Weight (kg): 63.3 (04-06 @ 00:33)  % Weight Change  4/10 128#    Pertinent Medications: MEDICATIONS  (STANDING):  amLODIPine   Tablet 5 milliGRAM(s) Oral daily  apixaban 2.5 milliGRAM(s) Oral two times a day  atorvastatin 20 milliGRAM(s) Oral at bedtime  citalopram 20 milliGRAM(s) Oral daily  cyanocobalamin 1000 MICROGram(s) Oral daily  metoprolol succinate ER 25 milliGRAM(s) Oral daily  sodium chloride 0.45% with potassium chloride 20 mEq/L 1000 milliLiter(s) (75 mL/Hr) IV Continuous <Continuous>    MEDICATIONS  (PRN):  acetaminophen     Tablet .. 650 milliGRAM(s) Oral every 6 hours PRN Temp greater or equal to 38C (100.4F), Mild Pain (1 - 3)  aluminum hydroxide/magnesium hydroxide/simethicone Suspension 30 milliLiter(s) Oral every 4 hours PRN Dyspepsia  melatonin 3 milliGRAM(s) Oral at bedtime PRN Insomnia  ondansetron Injectable 4 milliGRAM(s) IV Push every 8 hours PRN Nausea and/or Vomiting    Pertinent Labs: 04-11 Na138 mmol/L Glu 79 mg/dL K+ 4.1 mmol/L Cr  0.47 mg/dL<L> BUN 3 mg/dL<L> 04-10 Phos 3.2 mg/dL 04-11 Alb 3.0 g/dL<L>     CAPILLARY BLOOD GLUCOSE      POCT Blood Glucose.: 97 mg/dL (11 Apr 2024 14:18)    Skin: buttock skin tear    Estimated Needs:   [x ] no change since previous assessment  [ ] recalculated:     Previous Nutrition Diagnosis:   [ ] Inadequate Energy Intake [ x]Inadequate Oral Intake [ ] Excessive Energy Intake   [ ] Underweight [ ] Increased Nutrient Needs [ ] Overweight/Obesity   [ ] Altered GI Function [ ] Unintended Weight Loss [ ] Food & Nutrition Related Knowledge Deficit [ ] Malnutrition     Nutrition Diagnosis is [ ] ongoing  [ ] resolved [ ] not applicable     New Nutrition Diagnosis: [ ] not applicable       Interventions: modified diet, nutrition supplements and fortified snacks, IVF per MD orders  Recommend  [ ] Change Diet To:  [ ] Nutrition Supplement  [ ] Nutrition Support  [ ] Other:     Monitoring and Evaluation:   [ x] PO intake [ x ] Tolerance to diet prescription [ x ] weights [ x ] labs[ x ] follow up per protocol  [ ] other:

## 2024-04-11 NOTE — SOCIAL WORK PROGRESS NOTE - NSSWPROGRESSNOTE_GEN_ALL_CORE
FRED was asked to see if patient used all her sub acute rehab medicare days. I spoke with Lucy at [facility]. Patient was dc home from [facility] on 2/16/2024. Patient has not been facility free for 60 consecutive days so does not qualify for [facility]. FRED updated  who is working with daughter to secure resumption of MLTC and services at home

## 2024-04-12 LAB
ANION GAP SERPL CALC-SCNC: 8 MMOL/L — SIGNIFICANT CHANGE UP (ref 5–17)
BASE EXCESS BLDV CALC-SCNC: 4.5 MMOL/L — HIGH (ref -2–3)
BUN SERPL-MCNC: 5 MG/DL — LOW (ref 7–23)
CALCIUM SERPL-MCNC: 9.3 MG/DL — SIGNIFICANT CHANGE UP (ref 8.4–10.5)
CHLORIDE SERPL-SCNC: 110 MMOL/L — HIGH (ref 96–108)
CK SERPL-CCNC: 65 U/L — SIGNIFICANT CHANGE UP (ref 26–192)
CO2 SERPL-SCNC: 25 MMOL/L — SIGNIFICANT CHANGE UP (ref 22–31)
CREAT SERPL-MCNC: 0.44 MG/DL — LOW (ref 0.5–1.3)
EGFR: 91 ML/MIN/1.73M2 — SIGNIFICANT CHANGE UP
GLUCOSE SERPL-MCNC: 98 MG/DL — SIGNIFICANT CHANGE UP (ref 70–99)
HCO3 BLDV-SCNC: 30 MMOL/L — HIGH (ref 22–29)
HCT VFR BLD CALC: 39.1 % — SIGNIFICANT CHANGE UP (ref 34.5–45)
HGB BLD-MCNC: 12.4 G/DL — SIGNIFICANT CHANGE UP (ref 11.5–15.5)
MAGNESIUM SERPL-MCNC: 1.8 MG/DL — SIGNIFICANT CHANGE UP (ref 1.6–2.6)
MCHC RBC-ENTMCNC: 29.3 PG — SIGNIFICANT CHANGE UP (ref 27–34)
MCHC RBC-ENTMCNC: 31.7 GM/DL — LOW (ref 32–36)
MCV RBC AUTO: 92.4 FL — SIGNIFICANT CHANGE UP (ref 80–100)
NRBC # BLD: 0 /100 WBCS — SIGNIFICANT CHANGE UP (ref 0–0)
PCO2 BLDV: 53 MMHG — HIGH (ref 39–42)
PH BLDV: 7.36 — SIGNIFICANT CHANGE UP (ref 7.32–7.43)
PHOSPHATE SERPL-MCNC: 3.7 MG/DL — SIGNIFICANT CHANGE UP (ref 2.5–4.5)
PLATELET # BLD AUTO: 240 K/UL — SIGNIFICANT CHANGE UP (ref 150–400)
PO2 BLDV: 46 MMHG — HIGH (ref 25–45)
POTASSIUM SERPL-MCNC: 4.8 MMOL/L — SIGNIFICANT CHANGE UP (ref 3.5–5.3)
POTASSIUM SERPL-SCNC: 4.8 MMOL/L — SIGNIFICANT CHANGE UP (ref 3.5–5.3)
RBC # BLD: 4.23 M/UL — SIGNIFICANT CHANGE UP (ref 3.8–5.2)
RBC # FLD: 13.9 % — SIGNIFICANT CHANGE UP (ref 10.3–14.5)
SAO2 % BLDV: 77 % — SIGNIFICANT CHANGE UP (ref 67–88)
SODIUM SERPL-SCNC: 143 MMOL/L — SIGNIFICANT CHANGE UP (ref 135–145)
WBC # BLD: 8.14 K/UL — SIGNIFICANT CHANGE UP (ref 3.8–10.5)
WBC # FLD AUTO: 8.14 K/UL — SIGNIFICANT CHANGE UP (ref 3.8–10.5)

## 2024-04-12 PROCEDURE — 99233 SBSQ HOSP IP/OBS HIGH 50: CPT

## 2024-04-12 PROCEDURE — 99291 CRITICAL CARE FIRST HOUR: CPT

## 2024-04-12 PROCEDURE — ZZZZZ: CPT

## 2024-04-12 PROCEDURE — 70450 CT HEAD/BRAIN W/O DYE: CPT | Mod: 26

## 2024-04-12 RX ORDER — MIRTAZAPINE 45 MG/1
15 TABLET, ORALLY DISINTEGRATING ORAL AT BEDTIME
Refills: 0 | Status: DISCONTINUED | OUTPATIENT
Start: 2024-04-12 | End: 2024-04-20

## 2024-04-12 RX ORDER — DILTIAZEM HCL 120 MG
15 CAPSULE, EXT RELEASE 24 HR ORAL ONCE
Refills: 0 | Status: COMPLETED | OUTPATIENT
Start: 2024-04-12 | End: 2024-04-12

## 2024-04-12 RX ADMIN — Medication 25 MILLIGRAM(S): at 06:28

## 2024-04-12 RX ADMIN — AMLODIPINE BESYLATE 5 MILLIGRAM(S): 2.5 TABLET ORAL at 06:28

## 2024-04-12 RX ADMIN — PANTOPRAZOLE SODIUM 40 MILLIGRAM(S): 20 TABLET, DELAYED RELEASE ORAL at 06:28

## 2024-04-12 RX ADMIN — APIXABAN 2.5 MILLIGRAM(S): 2.5 TABLET, FILM COATED ORAL at 06:28

## 2024-04-12 RX ADMIN — DEXTROSE MONOHYDRATE, SODIUM CHLORIDE, AND POTASSIUM CHLORIDE 75 MILLILITER(S): 50; .745; 4.5 INJECTION, SOLUTION INTRAVENOUS at 09:12

## 2024-04-12 RX ADMIN — Medication 15 MILLIGRAM(S): at 23:43

## 2024-04-12 NOTE — CASE MANAGEMENT PROGRESS NOTE - NSCMPROGRESSNOTE_GEN_ALL_CORE
DISCHARGE PLANNING UPDATE  Patient is pending DME (hospital bed, kyara  w/c) delivery at home; daughter Marjan will coordinate for delivery with [facility] when DME authorization is approved; Telephone call to  [facility] ([facility] Aide provider) [phone] EXT. [phone];  [facility] [phone] OPTION 7;  to inform that patient is NOT ready for DC this weekend;

## 2024-04-12 NOTE — CASE MANAGEMENT PROGRESS NOTE - NSCMPROGRESSNOTE_GEN_ALL_CORE
DME NECESSITY NOTE:  RX: HOSPITAL BED; CRISTOFER LIFT; WHEELCHAIR   Hospital bed. Patient  requires a hospital bed due to Patient requires frequent and immediate repositioning changes that are not feasible in a regular bed with pillows or wedges. Patient need head of bed elevated more than 30 degrees most of the time due to Muscle weakness (M62.81); Acute Cystitis (N30.00); Bed Confinement Status (Z74.04); Aspiration risk (Y84.4); Patient need Gel overlay to prevent break downs/decubitus ulcer because the patient cannot make position changes without assistance.  Patient is a two person assist; transfers from bed to wheelchair; chair;  Total care of all ADLs, turn and reposition Q2H;

## 2024-04-12 NOTE — CHART NOTE - NSCHARTNOTEFT_GEN_A_CORE
Pt noted to be more lethargic and difficult to arouse    Pupils sluggish but equally reactive bl  no focal deficits, exam limited due to mental status  No other gross abnormalities on exam     Obtain NCCTH, VBG, and CPK   Q4H neurochecks  No events reported on tele  Goals of care discussed at length again with daughter, Marjan who wishes now to pursue DNR/DNI. SHAKIRA signed    CCT managing pt, reviewing chart, and coordinating care 35mins Pt noted to be more lethargic and difficult to arouse    Pupils sluggish but equally reactive bl  No focal deficits, exam limited due to mental status  No other gross abnormalities on exam     Obtain NCCTH eval for acute cva/hemmorhage, VBG to assess for hypercarbia, and CPK to assess muscle breakdown concerning for possible non clinical seizure activity  Q4H neurochecks  No events reported on tele  Goals of care discussed at length again with daughter, Marjan a short while ago who wishes now to pursue DNR/DNI.   MOL signed    Discussed with nursing staff   CCT managing pt, reviewing chart, and coordinating care 35mins

## 2024-04-13 LAB
ANION GAP SERPL CALC-SCNC: 8 MMOL/L — SIGNIFICANT CHANGE UP (ref 5–17)
BUN SERPL-MCNC: 6 MG/DL — LOW (ref 7–23)
CALCIUM SERPL-MCNC: 9.4 MG/DL — SIGNIFICANT CHANGE UP (ref 8.4–10.5)
CHLORIDE SERPL-SCNC: 108 MMOL/L — SIGNIFICANT CHANGE UP (ref 96–108)
CO2 SERPL-SCNC: 29 MMOL/L — SIGNIFICANT CHANGE UP (ref 22–31)
CREAT SERPL-MCNC: 0.51 MG/DL — SIGNIFICANT CHANGE UP (ref 0.5–1.3)
EGFR: 88 ML/MIN/1.73M2 — SIGNIFICANT CHANGE UP
GLUCOSE BLDC GLUCOMTR-MCNC: 75 MG/DL — SIGNIFICANT CHANGE UP (ref 70–99)
GLUCOSE BLDC GLUCOMTR-MCNC: 77 MG/DL — SIGNIFICANT CHANGE UP (ref 70–99)
GLUCOSE SERPL-MCNC: 86 MG/DL — SIGNIFICANT CHANGE UP (ref 70–99)
HCT VFR BLD CALC: 39.2 % — SIGNIFICANT CHANGE UP (ref 34.5–45)
HGB BLD-MCNC: 12.6 G/DL — SIGNIFICANT CHANGE UP (ref 11.5–15.5)
MAGNESIUM SERPL-MCNC: 1.7 MG/DL — SIGNIFICANT CHANGE UP (ref 1.6–2.6)
MCHC RBC-ENTMCNC: 29.9 PG — SIGNIFICANT CHANGE UP (ref 27–34)
MCHC RBC-ENTMCNC: 32.1 GM/DL — SIGNIFICANT CHANGE UP (ref 32–36)
MCV RBC AUTO: 92.9 FL — SIGNIFICANT CHANGE UP (ref 80–100)
NRBC # BLD: 0 /100 WBCS — SIGNIFICANT CHANGE UP (ref 0–0)
PLATELET # BLD AUTO: 254 K/UL — SIGNIFICANT CHANGE UP (ref 150–400)
POTASSIUM SERPL-MCNC: 4.4 MMOL/L — SIGNIFICANT CHANGE UP (ref 3.5–5.3)
POTASSIUM SERPL-SCNC: 4.4 MMOL/L — SIGNIFICANT CHANGE UP (ref 3.5–5.3)
RBC # BLD: 4.22 M/UL — SIGNIFICANT CHANGE UP (ref 3.8–5.2)
RBC # FLD: 13.6 % — SIGNIFICANT CHANGE UP (ref 10.3–14.5)
SODIUM SERPL-SCNC: 145 MMOL/L — SIGNIFICANT CHANGE UP (ref 135–145)
TSH SERPL-MCNC: 2.09 UIU/ML — SIGNIFICANT CHANGE UP (ref 0.27–4.2)
WBC # BLD: 7.65 K/UL — SIGNIFICANT CHANGE UP (ref 3.8–10.5)
WBC # FLD AUTO: 7.65 K/UL — SIGNIFICANT CHANGE UP (ref 3.8–10.5)

## 2024-04-13 PROCEDURE — 99232 SBSQ HOSP IP/OBS MODERATE 35: CPT

## 2024-04-13 RX ORDER — ENOXAPARIN SODIUM 100 MG/ML
30 INJECTION SUBCUTANEOUS EVERY 24 HOURS
Refills: 0 | Status: DISCONTINUED | OUTPATIENT
Start: 2024-04-13 | End: 2024-04-20

## 2024-04-13 RX ORDER — METOPROLOL TARTRATE 50 MG
5 TABLET ORAL EVERY 6 HOURS
Refills: 0 | Status: DISCONTINUED | OUTPATIENT
Start: 2024-04-13 | End: 2024-04-16

## 2024-04-13 RX ORDER — SODIUM CHLORIDE 9 MG/ML
1000 INJECTION, SOLUTION INTRAVENOUS
Refills: 0 | Status: DISCONTINUED | OUTPATIENT
Start: 2024-04-13 | End: 2024-04-20

## 2024-04-13 RX ORDER — ASPIRIN/CALCIUM CARB/MAGNESIUM 324 MG
300 TABLET ORAL DAILY
Refills: 0 | Status: DISCONTINUED | OUTPATIENT
Start: 2024-04-13 | End: 2024-04-19

## 2024-04-13 RX ORDER — PANTOPRAZOLE SODIUM 20 MG/1
40 TABLET, DELAYED RELEASE ORAL DAILY
Refills: 0 | Status: DISCONTINUED | OUTPATIENT
Start: 2024-04-13 | End: 2024-04-20

## 2024-04-13 RX ORDER — METOPROLOL TARTRATE 50 MG
5 TABLET ORAL ONCE
Refills: 0 | Status: COMPLETED | OUTPATIENT
Start: 2024-04-13 | End: 2024-04-13

## 2024-04-13 RX ADMIN — Medication 5 MILLIGRAM(S): at 23:48

## 2024-04-13 RX ADMIN — Medication 5 MILLIGRAM(S): at 02:34

## 2024-04-13 RX ADMIN — SODIUM CHLORIDE 125 MILLILITER(S): 9 INJECTION, SOLUTION INTRAVENOUS at 09:27

## 2024-04-13 RX ADMIN — ENOXAPARIN SODIUM 30 MILLIGRAM(S): 100 INJECTION SUBCUTANEOUS at 21:40

## 2024-04-13 RX ADMIN — SODIUM CHLORIDE 125 MILLILITER(S): 9 INJECTION, SOLUTION INTRAVENOUS at 02:42

## 2024-04-13 NOTE — PROVIDER CONTACT NOTE (OTHER) - BACKGROUND
UTI
Patient is admitted with acute cystitis without hematuria
pt chronic Afib, controlled rate.
Patient admitted for UTI, hx of afib.

## 2024-04-13 NOTE — PROVIDER CONTACT NOTE (OTHER) - ASSESSMENT
Patient asymptomatic, VSS HR ranging from 90's to 120's with moments not sustain up to 200's
no sign of discomfort
Patient is lethargic and arouses to voice but not able to keep awaking. IV fluid is running continuously

## 2024-04-14 PROCEDURE — 99232 SBSQ HOSP IP/OBS MODERATE 35: CPT

## 2024-04-14 RX ADMIN — ENOXAPARIN SODIUM 30 MILLIGRAM(S): 100 INJECTION SUBCUTANEOUS at 21:57

## 2024-04-14 RX ADMIN — Medication 5 MILLIGRAM(S): at 13:09

## 2024-04-14 RX ADMIN — SODIUM CHLORIDE 125 MILLILITER(S): 9 INJECTION, SOLUTION INTRAVENOUS at 21:57

## 2024-04-14 RX ADMIN — Medication 5 MILLIGRAM(S): at 23:36

## 2024-04-14 RX ADMIN — Medication 300 MILLIGRAM(S): at 13:45

## 2024-04-14 RX ADMIN — PANTOPRAZOLE SODIUM 40 MILLIGRAM(S): 20 TABLET, DELAYED RELEASE ORAL at 13:44

## 2024-04-14 RX ADMIN — Medication 5 MILLIGRAM(S): at 06:30

## 2024-04-14 RX ADMIN — Medication 5 MILLIGRAM(S): at 18:41

## 2024-04-15 PROCEDURE — 99233 SBSQ HOSP IP/OBS HIGH 50: CPT

## 2024-04-15 RX ADMIN — SODIUM CHLORIDE 125 MILLILITER(S): 9 INJECTION, SOLUTION INTRAVENOUS at 05:34

## 2024-04-15 RX ADMIN — ENOXAPARIN SODIUM 30 MILLIGRAM(S): 100 INJECTION SUBCUTANEOUS at 22:59

## 2024-04-15 RX ADMIN — Medication 5 MILLIGRAM(S): at 05:33

## 2024-04-15 RX ADMIN — SODIUM CHLORIDE 125 MILLILITER(S): 9 INJECTION, SOLUTION INTRAVENOUS at 16:59

## 2024-04-15 RX ADMIN — Medication 5 MILLIGRAM(S): at 23:46

## 2024-04-15 RX ADMIN — Medication 300 MILLIGRAM(S): at 12:00

## 2024-04-15 RX ADMIN — Medication 5 MILLIGRAM(S): at 17:12

## 2024-04-15 RX ADMIN — PANTOPRAZOLE SODIUM 40 MILLIGRAM(S): 20 TABLET, DELAYED RELEASE ORAL at 11:24

## 2024-04-15 RX ADMIN — Medication 5 MILLIGRAM(S): at 12:03

## 2024-04-15 NOTE — SOCIAL WORK PROGRESS NOTE - NSSWPROGRESSNOTE_GEN_ALL_CORE
Per interdisciplinary rounds patient's daughter wanted to see if patient appropriate for home hospice. SW waiting for palliative to evaluate patient and sw will follow up.

## 2024-04-16 PROCEDURE — 99233 SBSQ HOSP IP/OBS HIGH 50: CPT

## 2024-04-16 RX ORDER — METOPROLOL TARTRATE 50 MG
50 TABLET ORAL
Refills: 0 | Status: DISCONTINUED | OUTPATIENT
Start: 2024-04-16 | End: 2024-04-20

## 2024-04-16 RX ORDER — METOPROLOL TARTRATE 50 MG
25 TABLET ORAL
Refills: 0 | Status: DISCONTINUED | OUTPATIENT
Start: 2024-04-16 | End: 2024-04-16

## 2024-04-16 RX ORDER — METOPROLOL TARTRATE 50 MG
25 TABLET ORAL ONCE
Refills: 0 | Status: COMPLETED | OUTPATIENT
Start: 2024-04-16 | End: 2024-04-16

## 2024-04-16 RX ORDER — METOPROLOL TARTRATE 50 MG
25 TABLET ORAL ONCE
Refills: 0 | Status: DISCONTINUED | OUTPATIENT
Start: 2024-04-16 | End: 2024-04-16

## 2024-04-16 RX ORDER — DILTIAZEM HCL 120 MG
10 CAPSULE, EXT RELEASE 24 HR ORAL ONCE
Refills: 0 | Status: COMPLETED | OUTPATIENT
Start: 2024-04-16 | End: 2024-04-16

## 2024-04-16 RX ADMIN — Medication 50 MILLIGRAM(S): at 21:34

## 2024-04-16 RX ADMIN — PANTOPRAZOLE SODIUM 40 MILLIGRAM(S): 20 TABLET, DELAYED RELEASE ORAL at 11:46

## 2024-04-16 RX ADMIN — Medication 5 MILLIGRAM(S): at 11:46

## 2024-04-16 RX ADMIN — Medication 5 MILLIGRAM(S): at 05:23

## 2024-04-16 RX ADMIN — Medication 25 MILLIGRAM(S): at 16:35

## 2024-04-16 RX ADMIN — CITALOPRAM 20 MILLIGRAM(S): 10 TABLET, FILM COATED ORAL at 11:45

## 2024-04-16 RX ADMIN — Medication 10 MILLIGRAM(S): at 18:08

## 2024-04-16 RX ADMIN — ENOXAPARIN SODIUM 30 MILLIGRAM(S): 100 INJECTION SUBCUTANEOUS at 21:34

## 2024-04-16 RX ADMIN — MIRTAZAPINE 15 MILLIGRAM(S): 45 TABLET, ORALLY DISINTEGRATING ORAL at 21:34

## 2024-04-16 RX ADMIN — PREGABALIN 1000 MICROGRAM(S): 225 CAPSULE ORAL at 11:45

## 2024-04-16 RX ADMIN — SODIUM CHLORIDE 125 MILLILITER(S): 9 INJECTION, SOLUTION INTRAVENOUS at 05:23

## 2024-04-16 RX ADMIN — ATORVASTATIN CALCIUM 20 MILLIGRAM(S): 80 TABLET, FILM COATED ORAL at 21:34

## 2024-04-16 RX ADMIN — Medication 25 MILLIGRAM(S): at 16:51

## 2024-04-16 RX ADMIN — Medication 300 MILLIGRAM(S): at 11:45

## 2024-04-16 NOTE — PROVIDER CONTACT NOTE (OTHER) - REASON
patient is not eating and refuses to take oral medications
AFIB RVR 150s
Heart rate of 130-140
elevated heart rate
HR up to 200's not sustaining

## 2024-04-16 NOTE — PROVIDER CONTACT NOTE (OTHER) - SITUATION
HR up to 200's not sustaining
pt. heart rate was 170.
self limited Afib RVR
HR of 130-140
Patient is not eating food and not taking oral medications, including Eliquis

## 2024-04-16 NOTE — CHART NOTE - NSCHARTNOTEFT_GEN_A_CORE
Assessment: Per H&P, "89 y/o female with PMH of TIA, Afib on eliquis, dementia, UTI who presented to ED to be evaluated for AMS. pt lives home with her family and gets around by means of walker. about 20 days ago pt noted to have visual hallucination. per daughter pt saw little girl in room. pt was taken to her PCP as UTI had led to such symptoms in past. pt was placed on 10 days of keflex with improvement. pt had F/u appt with her PCP about 1 week ago where everything including urine cx was reported to be normal. in last 2 days pt became more confused. trying to get out of bed at night on her own and starts walking w/o walker. per daughter pt have been more weak and lethargy during day time. pt has home health visiting 6 days/week only during daytime. daughter thinks that pt is still having UTI and was BIBA. pt seems to ne pleasantly confused upon my initial evaluation. currently A and O X 3. pt does not recal any visual or auditory hallucination pt is not sure why she is here denies any abdominal pain, nausea, vomiting, fever,chills or any urinary symptoms. pt found to have UTI in ED for which she is to be admitted"      Pt seen for nutrition follow up. Pt visited while eating lunch, dtr at bedside.  Pt seen for swallowing eval , recommendation made for pureed consistency with thin liquid.  PO intake variable, generally <50-75% of most meals; taking small bites/amounts of puree lunch entree. Decreased po intake supplemented with ensure plus high protein bid (350kcal, 20g protein/8oz bottle), however, acceptance overall poor 2/2 pt dislike. Dtr reports pt enjoys regular ice cream but not magic cup.  Dtr assisting and providing encouragement at lunch. Pertinent medications/nutrition labs reviewed; .  RD to follow up and will continue to monitor pt's nutrition status.      Factors impacting intake: [ ] none [ ] nausea  [ ] vomiting [ ] diarrhea [ ] constipation  [ x]chewing problems [x ] swallowing issues  [ x] other: AMS    Diet Prescription: Diet, Pureed:   Supplement Feeding Modality:  Oral  Ensure Plus High Protein Cans or Servings Per Day:  1       Frequency:  Three Times a day (24 @ 15:12)    Intake: fair    Daily Weight in k.1 (10 Apr 2024 03:59)  % Weight Change    Pertinent Medications: MEDICATIONS  (STANDING):  amLODIPine   Tablet 5 milliGRAM(s) Oral daily  apixaban 2.5 milliGRAM(s) Oral two times a day  aspirin Suppository 300 milliGRAM(s) Rectal daily  atorvastatin 20 milliGRAM(s) Oral at bedtime  citalopram 20 milliGRAM(s) Oral daily  cyanocobalamin 1000 MICROGram(s) Oral daily  enoxaparin Injectable 30 milliGRAM(s) SubCutaneous every 24 hours  lactated ringers. 1000 milliLiter(s) (125 mL/Hr) IV Continuous <Continuous>  metoprolol tartrate Injectable 5 milliGRAM(s) IV Push every 6 hours  mirtazapine 15 milliGRAM(s) Oral at bedtime  pantoprazole  Injectable 40 milliGRAM(s) IV Push daily    MEDICATIONS  (PRN):  acetaminophen     Tablet .. 650 milliGRAM(s) Oral every 6 hours PRN Temp greater or equal to 38C (100.4F), Mild Pain (1 - 3)  aluminum hydroxide/magnesium hydroxide/simethicone Suspension 30 milliLiter(s) Oral every 4 hours PRN Dyspepsia  melatonin 3 milliGRAM(s) Oral at bedtime PRN Insomnia  ondansetron Injectable 4 milliGRAM(s) IV Push every 8 hours PRN Nausea and/or Vomiting    Pertinent Labs:   Phos 3.7 mg/dL - Alb 3.0 g/dL<L>     CAPILLARY BLOOD GLUCOSE      No edema or pressure injury documented per flowsheets.       Estimated Needs:   [x ] no change since previous assessment  [ ] recalculated:     Previous Nutrition Diagnosis:   [ ] Inadequate Energy Intake [ x]Inadequate Oral Intake [ ] Excessive Energy Intake   [ ] Underweight [ ] Increased Nutrient Needs [ ] Overweight/Obesity   [ ] Altered GI Function [ ] Unintended Weight Loss [ ] Food & Nutrition Related Knowledge Deficit [ ] Malnutrition     Nutrition Diagnosis is [ ] ongoing  [ ] resolved [ ] not applicable     New Nutrition Diagnosis: [ ] not applicable       Interventions: modified diet, nutrition supplements and fortified snacks, IVF per MD orders  Recommend  [ ] Change Diet To:  [ ] Nutrition Supplement  [ ] Nutrition Support  [ ] Other:     Monitoring and Evaluation:   [X ] Intake [ x ] Tolerance to diet prescription [ x ] weights [ x ] labs[ x ] follow up per protocol  [ ] other:

## 2024-04-16 NOTE — GOALS OF CARE CONVERSATION - ADVANCED CARE PLANNING - CONVERSATION DETAILS
Writer met with pt daughter at bedside Reviewed patient's medical and social history as well as events leading to patient's hospitalization. Writer discussed patient's current diagnosis TIA, Afib, UTI AMS debility, advanced age medical condition and management, prognosis, and life expectancy. Pt daughter requested to speak about CMO/hospice as pt was extremely confused and lethargic until today. Today pt is much more awake and alert, much less confused ans is eating. Daughter would like to wait another day to monitor pt improvement. Palliative care will remain available.
Writer spoke with patient's daughter by phone to further discuss GOC and planning.  Daughter states that patient was again more awake and alert today and was able to eat a good amount of her meals. Daughter stated she is still unsure about pursing a hospice referral and wants to wait another day to monitor for improvement. Palliative care will remain available.

## 2024-04-16 NOTE — PROVIDER CONTACT NOTE (OTHER) - ACTION/TREATMENT ORDERED:
Glucose is monitoring Q 6 hour. Continue to monitor
Patient administered stat dose of metoprolol.
Provider will assist further.
additional medication ordered. pt. received meds as ordered with daughter there. pt. remains on telemetry remote. pt. has no complaints of chest pain. pt. heart rate is still ranging from 125 to 166.

## 2024-04-16 NOTE — SOCIAL WORK PROGRESS NOTE - NSSWPROGRESSNOTE_GEN_ALL_CORE
Per GOC note daughter wants to wait until today to make decision about home with home care or to discuss home hospice. SW will follow for recommendations

## 2024-04-17 LAB
CULTURE RESULTS: SIGNIFICANT CHANGE UP
SPECIMEN SOURCE: SIGNIFICANT CHANGE UP

## 2024-04-17 PROCEDURE — 99233 SBSQ HOSP IP/OBS HIGH 50: CPT

## 2024-04-17 RX ADMIN — PANTOPRAZOLE SODIUM 40 MILLIGRAM(S): 20 TABLET, DELAYED RELEASE ORAL at 13:14

## 2024-04-17 RX ADMIN — Medication 50 MILLIGRAM(S): at 18:37

## 2024-04-17 RX ADMIN — Medication 300 MILLIGRAM(S): at 13:15

## 2024-04-17 RX ADMIN — ENOXAPARIN SODIUM 30 MILLIGRAM(S): 100 INJECTION SUBCUTANEOUS at 23:19

## 2024-04-17 RX ADMIN — CITALOPRAM 20 MILLIGRAM(S): 10 TABLET, FILM COATED ORAL at 13:16

## 2024-04-17 RX ADMIN — AMLODIPINE BESYLATE 5 MILLIGRAM(S): 2.5 TABLET ORAL at 06:03

## 2024-04-17 RX ADMIN — PREGABALIN 1000 MICROGRAM(S): 225 CAPSULE ORAL at 13:15

## 2024-04-17 RX ADMIN — Medication 50 MILLIGRAM(S): at 06:03

## 2024-04-17 NOTE — CHART NOTE - NSCHARTNOTEFT_GEN_A_CORE
Plan is for d/c home with home care services.  can be referred in community for hospice if family interested.  discussed with JOYCE richardson if needed.    Latricia Navarro MD, [facility]; Palliative Care Attending, Ethicist. [phone]

## 2024-04-17 NOTE — SOCIAL WORK PROGRESS NOTE - NSSWPROGRESSNOTE_GEN_ALL_CORE
FRED returned call to Polina Sawyer from Palliative. she felt that since patient's daughter was unsure about home hospice we should transition patient home with home care and resume MLTC. Then home care can assess for home hospice appropriate. FRED updated  for home

## 2024-04-17 NOTE — CASE MANAGEMENT PROGRESS NOTE - NSCMPROGRESSNOTE_GEN_ALL_CORE
DISCHARGE PLANNING UPDATE  Telephone to inform daughter that she needs to call [facility] [phone] to set-up the delivery of DME's in the home (hospital bed; kyara; TREY);  Daughter Marjan states she needs to speak to MD re: dc; Attending aware;  DC to home; patient to resume MLTC services and home care services;

## 2024-04-18 ENCOUNTER — TRANSCRIPTION ENCOUNTER (OUTPATIENT)
Age: 89
End: 2024-04-18

## 2024-04-18 PROCEDURE — 99233 SBSQ HOSP IP/OBS HIGH 50: CPT

## 2024-04-18 RX ORDER — AMLODIPINE BESYLATE 2.5 MG/1
1 TABLET ORAL
Qty: 0 | Refills: 0 | DISCHARGE
Start: 2024-04-18

## 2024-04-18 RX ORDER — METOPROLOL TARTRATE 50 MG
1 TABLET ORAL
Refills: 0 | DISCHARGE

## 2024-04-18 RX ORDER — METOPROLOL TARTRATE 50 MG
1 TABLET ORAL
Qty: 60 | Refills: 1
Start: 2024-04-18 | End: 2024-06-16

## 2024-04-18 RX ADMIN — SODIUM CHLORIDE 125 MILLILITER(S): 9 INJECTION, SOLUTION INTRAVENOUS at 09:15

## 2024-04-18 RX ADMIN — Medication 50 MILLIGRAM(S): at 18:52

## 2024-04-18 RX ADMIN — AMLODIPINE BESYLATE 5 MILLIGRAM(S): 2.5 TABLET ORAL at 07:02

## 2024-04-18 RX ADMIN — Medication 50 MILLIGRAM(S): at 06:56

## 2024-04-18 RX ADMIN — ENOXAPARIN SODIUM 30 MILLIGRAM(S): 100 INJECTION SUBCUTANEOUS at 23:06

## 2024-04-18 NOTE — DISCHARGE NOTE PROVIDER - NSDCCPCAREPLAN_GEN_ALL_CORE_FT
PRINCIPAL DISCHARGE DIAGNOSIS  Diagnosis: Acute cystitis  Assessment and Plan of Treatment: Treated with Antibiotics. Course completed      SECONDARY DISCHARGE DIAGNOSES  Diagnosis: Delirium  Assessment and Plan of Treatment: Due to UTI.  This was resolved.

## 2024-04-18 NOTE — DISCHARGE NOTE PROVIDER - NSDCMRMEDTOKEN_GEN_ALL_CORE_FT
citalopram 20 mg oral tablet: 1 tab(s) orally once a day  Eliquis 2.5 mg oral tablet: 1 tab(s) orally 2 times a day  metoprolol succinate 25 mg oral tablet, extended release: 1 tab(s) orally 2 times a day  rosuvastatin 5 mg oral capsule: 1 cap(s) orally once a day  Vitamin B-12 1000 mcg oral tablet: 1 tab(s) orally once a day  Vitamin D3 50 mcg (2000 intl units) oral capsule: 1 cap(s) orally once a day   amLODIPine 5 mg oral tablet: 1 tab(s) orally once a day  citalopram 20 mg oral tablet: 1 tab(s) orally once a day  Eliquis 2.5 mg oral tablet: 1 tab(s) orally 2 times a day  metoprolol succinate 25 mg oral tablet, extended release: 1 tab(s) orally 2 times a day  rosuvastatin 5 mg oral capsule: 1 cap(s) orally once a day  Vitamin B-12 1000 mcg oral tablet: 1 tab(s) orally once a day  Vitamin D3 50 mcg (2000 intl units) oral capsule: 1 cap(s) orally once a day   amLODIPine 5 mg oral tablet: 1 tab(s) orally once a day  citalopram 20 mg oral tablet: 1 tab(s) orally once a day  Eliquis 2.5 mg oral tablet: 1 tab(s) orally 2 times a day  Metoprolol Tartrate 50 mg oral tablet: 1 tab(s) orally 2 times a day  rosuvastatin 5 mg oral capsule: 1 cap(s) orally once a day  Vitamin B-12 1000 mcg oral tablet: 1 tab(s) orally once a day  Vitamin D3 50 mcg (2000 intl units) oral capsule: 1 cap(s) orally once a day

## 2024-04-18 NOTE — DISCHARGE NOTE PROVIDER - HOSPITAL COURSE
89F admitted for AMS found to have UTI     Acute metabolic encephalopathy  E.coli UTI   Completed 5 day course of ceftriaxone on 4/11  Stable for dismissal from medical standpoint  Pt will need DME at home prior to dismissal. DC will depend on once these available at home. Refer to CM notes for further details     GERD   Continue protonix 40mg and PRN maalox     Geriatric fraily/cachexia   Add remeron for apetite stimulation   Ensure TID w meals  Assist with meals, as needed   Palliative care consulted      Afib  - Remains Tachycardic  - Unable to take oral medications at this time so will order Lopressor 5mg Q6H  - C/w eliquis 2.5 mg bid    HLD  -C/w lipitor 20 mg qd    Code status discussed with daughter and she is DNR/DNI and possible transition to Hospice   Will need to have further discussion    Patient is high likelihood for readmission due to waxing and waning of her oral intake. Family given the option of feeding tube which they do not want at this point.

## 2024-04-18 NOTE — CASE MANAGEMENT PROGRESS NOTE - NSCMPROGRESSNOTE_GEN_ALL_CORE
DISCHARGE PLANNING UPDATE:  As per conversation with Attending: Patient's daughter informed that Attending that [facility]- Hospital bed,  Familia, Wheel chair will not be delivered until Monday 04/22/2024 by [facility]; RN CCM telephone call to [facility] Liaison / Ariana [phone] stated that Marjan has instructed [facility] to deliver on Monday 04/22/2024; Patient has been DC'd today 04/18/2024;  Telephone call to Marjan to discuss DC; Marjan stated she and her family will not be available today or Friday 04/19/2024 to bring her mother home; RN CCM explained and discussed IMM and appeals process with Marjan; instructed Marjan to call [employer] [phone] to appeal patient's DC today 04/18/2024;     DC Summary has been faxed to [employer] [facility]  fax [phone] requesting resumption of care services for Saturday 04/20/2024. In anticipation of appeals process and appeal determination / decision on Saturday 04/20/2024 before 12pm.    DC Summary and clinicals sent to [facility] Provider sent via [employer] requesting resumption of Services for 04/20/2024;

## 2024-04-19 LAB
ANION GAP SERPL CALC-SCNC: 4 MMOL/L — LOW (ref 5–17)
BUN SERPL-MCNC: 7 MG/DL — SIGNIFICANT CHANGE UP (ref 7–23)
CALCIUM SERPL-MCNC: 8.8 MG/DL — SIGNIFICANT CHANGE UP (ref 8.4–10.5)
CHLORIDE SERPL-SCNC: 109 MMOL/L — HIGH (ref 96–108)
CO2 SERPL-SCNC: 34 MMOL/L — HIGH (ref 22–31)
CREAT SERPL-MCNC: 0.49 MG/DL — LOW (ref 0.5–1.3)
EGFR: 88 ML/MIN/1.73M2 — SIGNIFICANT CHANGE UP
GLUCOSE SERPL-MCNC: 83 MG/DL — SIGNIFICANT CHANGE UP (ref 70–99)
HCT VFR BLD CALC: 36.9 % — SIGNIFICANT CHANGE UP (ref 34.5–45)
HGB BLD-MCNC: 11.8 G/DL — SIGNIFICANT CHANGE UP (ref 11.5–15.5)
MCHC RBC-ENTMCNC: 30 PG — SIGNIFICANT CHANGE UP (ref 27–34)
MCHC RBC-ENTMCNC: 32 GM/DL — SIGNIFICANT CHANGE UP (ref 32–36)
MCV RBC AUTO: 93.9 FL — SIGNIFICANT CHANGE UP (ref 80–100)
NRBC # BLD: 0 /100 WBCS — SIGNIFICANT CHANGE UP (ref 0–0)
PLATELET # BLD AUTO: 199 K/UL — SIGNIFICANT CHANGE UP (ref 150–400)
POTASSIUM SERPL-MCNC: 4.4 MMOL/L — SIGNIFICANT CHANGE UP (ref 3.5–5.3)
POTASSIUM SERPL-SCNC: 4.4 MMOL/L — SIGNIFICANT CHANGE UP (ref 3.5–5.3)
RBC # BLD: 3.93 M/UL — SIGNIFICANT CHANGE UP (ref 3.8–5.2)
RBC # FLD: 13.2 % — SIGNIFICANT CHANGE UP (ref 10.3–14.5)
SODIUM SERPL-SCNC: 147 MMOL/L — HIGH (ref 135–145)
WBC # BLD: 5.53 K/UL — SIGNIFICANT CHANGE UP (ref 3.8–10.5)
WBC # FLD AUTO: 5.53 K/UL — SIGNIFICANT CHANGE UP (ref 3.8–10.5)

## 2024-04-19 PROCEDURE — 99233 SBSQ HOSP IP/OBS HIGH 50: CPT

## 2024-04-19 RX ORDER — ASPIRIN/CALCIUM CARB/MAGNESIUM 324 MG
81 TABLET ORAL DAILY
Refills: 0 | Status: DISCONTINUED | OUTPATIENT
Start: 2024-04-19 | End: 2024-04-20

## 2024-04-19 RX ADMIN — CITALOPRAM 20 MILLIGRAM(S): 10 TABLET, FILM COATED ORAL at 12:21

## 2024-04-19 RX ADMIN — PANTOPRAZOLE SODIUM 40 MILLIGRAM(S): 20 TABLET, DELAYED RELEASE ORAL at 12:21

## 2024-04-19 RX ADMIN — PREGABALIN 1000 MICROGRAM(S): 225 CAPSULE ORAL at 12:21

## 2024-04-19 RX ADMIN — ENOXAPARIN SODIUM 30 MILLIGRAM(S): 100 INJECTION SUBCUTANEOUS at 21:47

## 2024-04-19 RX ADMIN — ATORVASTATIN CALCIUM 20 MILLIGRAM(S): 80 TABLET, FILM COATED ORAL at 21:48

## 2024-04-19 RX ADMIN — Medication 50 MILLIGRAM(S): at 06:40

## 2024-04-19 RX ADMIN — Medication 50 MILLIGRAM(S): at 17:23

## 2024-04-19 RX ADMIN — AMLODIPINE BESYLATE 5 MILLIGRAM(S): 2.5 TABLET ORAL at 06:41

## 2024-04-19 RX ADMIN — SODIUM CHLORIDE 125 MILLILITER(S): 9 INJECTION, SOLUTION INTRAVENOUS at 17:06

## 2024-04-19 RX ADMIN — SODIUM CHLORIDE 125 MILLILITER(S): 9 INJECTION, SOLUTION INTRAVENOUS at 12:19

## 2024-04-19 RX ADMIN — Medication 81 MILLIGRAM(S): at 12:22

## 2024-04-19 RX ADMIN — MIRTAZAPINE 15 MILLIGRAM(S): 45 TABLET, ORALLY DISINTEGRATING ORAL at 21:48

## 2024-04-20 ENCOUNTER — TRANSCRIPTION ENCOUNTER (OUTPATIENT)
Age: 89
End: 2024-04-20

## 2024-04-20 VITALS
HEART RATE: 77 BPM | TEMPERATURE: 98 F | RESPIRATION RATE: 18 BRPM | OXYGEN SATURATION: 95 % | SYSTOLIC BLOOD PRESSURE: 144 MMHG | DIASTOLIC BLOOD PRESSURE: 80 MMHG

## 2024-04-20 LAB
ANION GAP SERPL CALC-SCNC: 7 MMOL/L — SIGNIFICANT CHANGE UP (ref 5–17)
BUN SERPL-MCNC: 8 MG/DL — SIGNIFICANT CHANGE UP (ref 7–23)
CALCIUM SERPL-MCNC: 8.9 MG/DL — SIGNIFICANT CHANGE UP (ref 8.4–10.5)
CHLORIDE SERPL-SCNC: 104 MMOL/L — SIGNIFICANT CHANGE UP (ref 96–108)
CO2 SERPL-SCNC: 32 MMOL/L — HIGH (ref 22–31)
CREAT SERPL-MCNC: 0.47 MG/DL — LOW (ref 0.5–1.3)
EGFR: 89 ML/MIN/1.73M2 — SIGNIFICANT CHANGE UP
GLUCOSE SERPL-MCNC: 99 MG/DL — SIGNIFICANT CHANGE UP (ref 70–99)
HCT VFR BLD CALC: 38.2 % — SIGNIFICANT CHANGE UP (ref 34.5–45)
HGB BLD-MCNC: 12.1 G/DL — SIGNIFICANT CHANGE UP (ref 11.5–15.5)
MCHC RBC-ENTMCNC: 29.7 PG — SIGNIFICANT CHANGE UP (ref 27–34)
MCHC RBC-ENTMCNC: 31.7 GM/DL — LOW (ref 32–36)
MCV RBC AUTO: 93.6 FL — SIGNIFICANT CHANGE UP (ref 80–100)
NRBC # BLD: 0 /100 WBCS — SIGNIFICANT CHANGE UP (ref 0–0)
PLATELET # BLD AUTO: 214 K/UL — SIGNIFICANT CHANGE UP (ref 150–400)
POTASSIUM SERPL-MCNC: 4.2 MMOL/L — SIGNIFICANT CHANGE UP (ref 3.5–5.3)
POTASSIUM SERPL-SCNC: 4.2 MMOL/L — SIGNIFICANT CHANGE UP (ref 3.5–5.3)
RBC # BLD: 4.08 M/UL — SIGNIFICANT CHANGE UP (ref 3.8–5.2)
RBC # FLD: 13.1 % — SIGNIFICANT CHANGE UP (ref 10.3–14.5)
SODIUM SERPL-SCNC: 143 MMOL/L — SIGNIFICANT CHANGE UP (ref 135–145)
WBC # BLD: 6.1 K/UL — SIGNIFICANT CHANGE UP (ref 3.8–10.5)
WBC # FLD AUTO: 6.1 K/UL — SIGNIFICANT CHANGE UP (ref 3.8–10.5)

## 2024-04-20 PROCEDURE — 80048 BASIC METABOLIC PNL TOTAL CA: CPT

## 2024-04-20 PROCEDURE — 87086 URINE CULTURE/COLONY COUNT: CPT

## 2024-04-20 PROCEDURE — 84443 ASSAY THYROID STIM HORMONE: CPT

## 2024-04-20 PROCEDURE — 70450 CT HEAD/BRAIN W/O DYE: CPT | Mod: MC

## 2024-04-20 PROCEDURE — 0225U NFCT DS DNA&RNA 21 SARSCOV2: CPT

## 2024-04-20 PROCEDURE — 87186 SC STD MICRODIL/AGAR DIL: CPT

## 2024-04-20 PROCEDURE — 93005 ELECTROCARDIOGRAM TRACING: CPT

## 2024-04-20 PROCEDURE — 96374 THER/PROPH/DIAG INJ IV PUSH: CPT

## 2024-04-20 PROCEDURE — 82803 BLOOD GASES ANY COMBINATION: CPT

## 2024-04-20 PROCEDURE — 84100 ASSAY OF PHOSPHORUS: CPT

## 2024-04-20 PROCEDURE — 83735 ASSAY OF MAGNESIUM: CPT

## 2024-04-20 PROCEDURE — 97110 THERAPEUTIC EXERCISES: CPT

## 2024-04-20 PROCEDURE — 36415 COLL VENOUS BLD VENIPUNCTURE: CPT

## 2024-04-20 PROCEDURE — 96375 TX/PRO/DX INJ NEW DRUG ADDON: CPT

## 2024-04-20 PROCEDURE — 85027 COMPLETE CBC AUTOMATED: CPT

## 2024-04-20 PROCEDURE — 99285 EMERGENCY DEPT VISIT HI MDM: CPT

## 2024-04-20 PROCEDURE — 80053 COMPREHEN METABOLIC PANEL: CPT

## 2024-04-20 PROCEDURE — 82962 GLUCOSE BLOOD TEST: CPT

## 2024-04-20 PROCEDURE — 87040 BLOOD CULTURE FOR BACTERIA: CPT

## 2024-04-20 PROCEDURE — 85730 THROMBOPLASTIN TIME PARTIAL: CPT

## 2024-04-20 PROCEDURE — 97165 OT EVAL LOW COMPLEX 30 MIN: CPT

## 2024-04-20 PROCEDURE — 71045 X-RAY EXAM CHEST 1 VIEW: CPT

## 2024-04-20 PROCEDURE — 82550 ASSAY OF CK (CPK): CPT

## 2024-04-20 PROCEDURE — 83605 ASSAY OF LACTIC ACID: CPT

## 2024-04-20 PROCEDURE — 85610 PROTHROMBIN TIME: CPT

## 2024-04-20 PROCEDURE — 85025 COMPLETE CBC W/AUTO DIFF WBC: CPT

## 2024-04-20 PROCEDURE — 97535 SELF CARE MNGMENT TRAINING: CPT

## 2024-04-20 PROCEDURE — 81001 URINALYSIS AUTO W/SCOPE: CPT

## 2024-04-20 PROCEDURE — 97161 PT EVAL LOW COMPLEX 20 MIN: CPT

## 2024-04-20 PROCEDURE — 97116 GAIT TRAINING THERAPY: CPT

## 2024-04-20 PROCEDURE — 99232 SBSQ HOSP IP/OBS MODERATE 35: CPT

## 2024-04-20 PROCEDURE — 97530 THERAPEUTIC ACTIVITIES: CPT

## 2024-04-20 PROCEDURE — 84484 ASSAY OF TROPONIN QUANT: CPT

## 2024-04-20 RX ORDER — METOPROLOL TARTRATE 50 MG
1 TABLET ORAL
Qty: 60 | Refills: 2
Start: 2024-04-20 | End: 2024-07-18

## 2024-04-20 RX ADMIN — AMLODIPINE BESYLATE 5 MILLIGRAM(S): 2.5 TABLET ORAL at 06:37

## 2024-04-20 RX ADMIN — Medication 50 MILLIGRAM(S): at 06:37

## 2024-04-20 NOTE — CASE MANAGEMENT PROGRESS NOTE - NSCMPROGRESSNOTE_GEN_ALL_CORE
This CM spoke to the daughter Marjan and pt at the bedside. The pt is cleared for discharge home today with [facility] with a SOC on 4/20/24. The daughter states that she received all the DME from [facility] to take care of the pt, hospital bed, WC, and kyara lift. The pt has aides 45 hours a week from [facility] and Marjan the daughter states that the aide will be int he home today from [facility]6P. Ambulance was set up for 11:30AM . Daughter is aware and in agreement with the discharge plan. Bedside nurse aware of the transition home plan.  This CM spoke to the daughter Marjan and pt at the bedside. The pt is cleared for discharge home today with [facility] with a SOC on 4/21/24. The daughter states that she received all the DME from [facility] to take care of the pt, hospital bed, WC, and kyara lift. The pt has aides 45 hours a week from [facility] and Marjan the daughter states that the aide will be int he home today from 1P-6P. Ambulance was set up for 11:30AM . Daughter is aware and in agreement with the discharge plan. Bedside nurse aware of the transition home plan.  This CM spoke to the daughter Marjan and pt at the bedside. The pt is cleared for discharge home today with [facility] with a SOC on 4/21/24. The daughter states that she received all the DME from [facility] to take care of the pt, hospital bed, WC, and kyara lift. The pt has aides 45 hours a week from [facility] and Marjan the daughter states that the aide will be int he home today from 1P-6P. Ambulance was set up for 11:30AM . Daughter is aware and in agreement with the discharge plan. Bedside nurse aware of the transition home plan. DC summary was faxed to [employer] [facility] by the CM team on Friday.

## 2024-04-20 NOTE — DISCHARGE NOTE NURSING/CASE MANAGEMENT/SOCIAL WORK - NSDCVIVACCINE_GEN_ALL_CORE_FT
The ABCs of the Annual Wellness Visit  Subsequent Medicare Wellness Visit    Subjective      Carroll Frost is a 84 y.o. male who presents for a Subsequent Medicare Wellness Visit.    Patient does need a great deal of his medications refilled today.  Overall he has been stable on these medications.  No major changes recently.  He is also due for blood work.  He did recently undergo diagnostic testing for obstructive sleep apnea.  His sleep study did show moderate obstructive sleep apnea.  We have sent orders to the pharmacy but he has not gotten his machine yet.    The following portions of the patient's history were reviewed and   updated as appropriate: allergies, current medications, past family history, past medical history, past social history, past surgical history and problem list.    Compared to one year ago, the patient feels his physical   health is the same.    Compared to one year ago, the patient feels his mental   health is the same.    Recent Hospitalizations:  He was not admitted to the hospital during the last year.       Current Medical Providers:  Patient Care Team:  Carroll Malone MD as PCP - General (Family Medicine)    Outpatient Medications Prior to Visit   Medication Sig Dispense Refill   • tamsulosin (FLOMAX) 0.4 MG capsule 24 hr capsule TAKE ONE CAPSULE BY MOUTH DAILY 90 capsule 1   • bumetanide (BUMEX) 1 MG tablet Take 1 tablet by mouth Daily. 90 tablet 1   • gabapentin (NEURONTIN) 100 MG capsule Take 1 capsule by mouth 3 (Three) Times a Day. 90 capsule 2   • metoprolol tartrate (LOPRESSOR) 25 MG tablet Take 1 tablet by mouth 2 (Two) Times a Day. 180 tablet 1   • nabumetone (RELAFEN) 750 MG tablet TAKE ONE TABLET BY MOUTH TWICE A DAY 60 tablet 2   • oxybutynin XL (DITROPAN-XL) 10 MG 24 hr tablet Take 1 tablet by mouth Daily. 90 tablet 1   • pantoprazole (PROTONIX) 40 MG EC tablet TAKE ONE TABLET BY MOUTH DAILY 30 tablet 2   • potassium chloride ER (K-TAB) 20 MEQ tablet  "controlled-release ER tablet TAKE ONE TABLET BY MOUTH DAILY WITH FOOD 30 tablet 2   • simvastatin (ZOCOR) 40 MG tablet Take 1 tablet by mouth Every Evening. 90 tablet 1     No facility-administered medications prior to visit.       No opioid medication identified on active medication list. I have reviewed chart for other potential  high risk medication/s and harmful drug interactions in the elderly.          Aspirin is not on active medication list.  Aspirin use is not indicated based on review of current medical condition/s. Risk of harm outweighs potential benefits.  .    Patient Active Problem List   Diagnosis   • Overactive bladder   • Hyperlipidemia   • Mood disorder (HCC)   • Lower urinary tract symptoms due to benign prostatic hyperplasia     Advance Care Planning  Advance Directive is not on file.  ACP discussion was held with the patient during this visit. Patient does not have an advance directive, information provided.     Objective    Vitals:    01/27/23 0936   BP: 144/80   BP Location: Left arm   Patient Position: Sitting   Cuff Size: Large Adult   Pulse: 57   Weight: 89.8 kg (198 lb)   Height: 165.1 cm (65\")     Estimated body mass index is 32.95 kg/m² as calculated from the following:    Height as of this encounter: 165.1 cm (65\").    Weight as of this encounter: 89.8 kg (198 lb).    BMI is >= 30 and <35. (Class 1 Obesity). The following options were offered after discussion;: exercise counseling/recommendations and nutrition counseling/recommendations      Does the patient have evidence of cognitive impairment?   No            HEALTH RISK ASSESSMENT    Smoking Status:  Social History     Tobacco Use   Smoking Status Never   Smokeless Tobacco Never   Tobacco Comments    Tobacco/Vaping use     Alcohol Consumption:  Social History     Substance and Sexual Activity   Alcohol Use None     Fall Risk Screen:    [facility].    Depression Screening:  PHQ-2/PHQ-9 " Tdap; 15-Manpreet-2021 23:24; Tata Aleman); [employer]; [id number]   (Exp. Date: 11-Sep-2022); IntraMuscular; Deltoid Right.; 0.5 milliLiter(s); VIS (VIS Published: 09-May-2013, VIS Presented: 15-Manpreet-2021);    Depression Screening 1/27/2023   Little Interest or Pleasure in Doing Things 0-->not at all   Feeling Down, Depressed or Hopeless 0-->not at all   PHQ-9: Brief Depression Severity Measure Score 0       Health Habits and Functional and Cognitive Screening:  Functional & Cognitive Status 1/27/2023   Do you have difficulty preparing food and eating? No   Do you have difficulty bathing yourself, getting dressed or grooming yourself? No   Do you have difficulty using the toilet? No   Do you have difficulty moving around from place to place? No   Do you have trouble with steps or getting out of a bed or a chair? Yes   Current Diet Well Balanced Diet   Dental Exam Up to date   Eye Exam Up to date   Exercise (times per week) 3 times per week   Current Exercises Include Home Fitness Gym   Do you need help using the phone?  No   Are you deaf or do you have serious difficulty hearing?  No   Do you need help with transportation? No   Do you need help shopping? No   Do you need help preparing meals?  No   Do you need help with housework?  No   Do you need help with laundry? No   Do you need help taking your medications? No   Do you need help managing money? No   Do you ever drive or ride in a car without wearing a seat belt? No   Have you felt unusual stress, anger or loneliness in the last month? No   Who do you live with? Alone   If you need help, do you have trouble finding someone available to you? No   Have you been bothered in the last four weeks by sexual problems? No   Do you have difficulty concentrating, remembering or making decisions? Yes       Age-appropriate Screening Schedule:  Refer to the list below for future screening recommendations based on patient's age, sex and/or medical conditions. Orders for these recommended tests are listed in the plan section. The patient has been provided with a written plan.    Health Maintenance   Topic Date Due   • URINE MICROALBUMIN  Never done   • ZOSTER VACCINE (1 of 2)  11/25/1988   • TDAP/TD VACCINES (1 - Tdap) 05/13/2009   • DIABETIC EYE EXAM  03/26/2022   • INFLUENZA VACCINE  08/01/2022   • HEMOGLOBIN A1C  01/15/2023   • LIPID PANEL  07/15/2023                CMS Preventative Services Quick Reference  Risk Factors Identified During Encounter:    None Identified    The above risks/problems have been discussed with the patient.  Pertinent information has been shared with the patient in the After Visit Summary.    Diagnoses and all orders for this visit:    1. Wellness examination (Primary)  -     Lipid panel; Future  -     Lipid panel    2. Type 2 diabetes mellitus with diabetic autonomic neuropathy, without long-term current use of insulin (HCC)  -     Hemoglobin A1c; Future  -     gabapentin (NEURONTIN) 100 MG capsule; Take 1 capsule by mouth 3 (Three) Times a Day.  Dispense: 90 capsule; Refill: 2  -     Hemoglobin A1c  -     Lipid panel; Future  -     Lipid panel    3. Mixed hyperlipidemia  -     Comprehensive Metabolic Panel; Future  -     Lipid Panel With / Chol / HDL Ratio; Future  -     Comprehensive Metabolic Panel  -     Cancel: Lipid Panel With / Chol / HDL Ratio  -     Lipid panel; Future  -     Lipid panel    4. Primary hypertension  -     CBC & Differential; Future  -     Comprehensive Metabolic Panel; Future  -     TSH; Future  -     Lipid Panel With / Chol / HDL Ratio; Future  -     CBC & Differential  -     Comprehensive Metabolic Panel  -     TSH  -     Cancel: Lipid Panel With / Chol / HDL Ratio  -     Lipid panel; Future  -     Lipid panel    5. Overactive bladder    6. Obstructive sleep apnea    Other orders  -     metoprolol tartrate (LOPRESSOR) 25 MG tablet; Take 1 tablet by mouth 2 (Two) Times a Day.  Dispense: 180 tablet; Refill: 1  -     simvastatin (ZOCOR) 40 MG tablet; Take 1 tablet by mouth Every Evening.  Dispense: 90 tablet; Refill: 1  -     oxybutynin XL (DITROPAN-XL) 10 MG 24 hr tablet; Take 1 tablet by mouth Daily.  Dispense: 90 tablet; Refill:  1  -     bumetanide (BUMEX) 1 MG tablet; Take 1 tablet by mouth Daily.  Dispense: 90 tablet; Refill: 1  -     pantoprazole (PROTONIX) 40 MG EC tablet; Take 1 tablet by mouth Daily.  Dispense: 90 tablet; Refill: 1  -     potassium chloride ER (K-TAB) 20 MEQ tablet controlled-release ER tablet; Take 1 tablet by mouth Daily. with food.  Dispense: 90 tablet; Refill: 1  -     nabumetone (RELAFEN) 750 MG tablet; Take 1 tablet by mouth 2 (Two) Times a Day.  Dispense: 180 tablet; Refill: 1    Updated annual wellness visit checklist.  Immunizations discussed.  Screening up-to-date.  Recommend yearly dental and eye exams. Also discussed monitoring of blood pressure and lipids. We addressed patient self-assessment of health status, frailty, and physical functioning. We reviewed psychosocial risks, behavioral risks, instrumental activities of daily living, and patient health risk assessment. Patient was given a personalized prevention plan.     Refill chronic medications.  We will check on the orders for his new CPAP machine.  His sleep apnea test does confirm obstructive sleep apnea.  He needs new machine and supplies.  Lab work today.    Follow Up:   Next Medicare Wellness visit to be scheduled in 1 year.      An After Visit Summary and PPPS were made available to the patient.

## 2024-04-20 NOTE — PROGRESS NOTE ADULT - ASSESSMENT
89 y/o female presented to ED due to AMS found to have UTI:        AMS secondary to metabolic encephalopathy  secondary to uti  -c/w rocephine 1 gm qd   -urine cx: ECOLI f/u sensitivity  -gentle IV hydration  -monitor fever curve and if develops any despite on antibiotics for 48 hours then will have low threshold to obtain CT abd/pel   -F/u blood cx  -PT/OT eval   -puree diet  -aspiration precaution    Afib  -C/w metoprolol succ 25 mg qd  -C/w eliquis 2.5 mg bid  -monitor on tele     Hypokalemia/hypernatremia  Na 146  K 3.2  fluids changed to 1/2NS with 20meq KCl 75cc/hr plus 40meq KCl PO    HLD  -C/w lipitor 20 mg qd    code status: Full (discussed with pt at bedside)  PT: [facility]  
89F admitted for AMS found to have UTI     Acute metabolic encephalopathy  E.coli UTI   Completed 5 day course of ceftriaxone on 4/11  Stable for dismissal from medical standpoint  Pt will need DME at home prior to dismissal (DME not available to be delivered this weekend). DC will depend on once these available at home. Refer to CM notes for further details     GERD   Continue protonix 40mg and PRN maalox     Geriatric fraily/cachexia   Add remeron for apetite stimulation   Ensure TID w meals  Assist with meals, as needed   Palliative care consulted      Afib  -C/w metoprolol succ 25 mg qd  -C/w eliquis 2.5 mg bid    HLD  -C/w lipitor 20 mg qd    Code status discussed with daughter, wishes for full code      
89F admitted for AMS found to have UTI     Acute metabolic encephalopathy  E.coli UTI   Completed 5 day course of ceftriaxone on 4/11  Stable for dismissal from medical standpoint  Pt will need DME at home prior to dismissal. DC will depend on once these available at home. Refer to CM notes for further details     GERD   Continue protonix 40mg and PRN maalox     Geriatric fraily/cachexia   Add remeron for apetite stimulation   Ensure TID w meals  Assist with meals, as needed   Palliative care consulted      Afib  - Remains Tachycardic  - Unable to take oral medications at this time so will order Lopressor 5mg Q6H  - C/w eliquis 2.5 mg bid    HLD  -C/w lipitor 20 mg qd    Code status discussed with daughter and she is DNR/DNI and possible transition to Hospice   Will need to have further discussion    Patient is high likelihood for readmission due to waxing and waning of her oral intake. Family given the option of feeding tube which they do not want at this point.      
89F admitted for AMS found to have UTI     Acute metabolic encephalopathy  E.coli UTI   Completed 5 day course of ceftriaxone on 4/11  Stable for dismissal from medical standpoint  Pt will need DME at home prior to dismissal. DC will depend on once these available at home. Refer to CM notes for further details     GERD   Continue protonix 40mg and PRN maalox     Geriatric fraily/cachexia   Add remeron for apetite stimulation   Ensure TID w meals  Assist with meals, as needed   Palliative care consulted      Afib  - Remains Tachycardic  - Unable to take oral medications at this time so will order Lopressor 5mg Q6H  - C/w eliquis 2.5 mg bid    HLD  -C/w lipitor 20 mg qd    Code status discussed with daughter and she is DNR/DNI and possible transition to Hospice   Will need to have further discussion    Patient is high likelihood for readmission due to waxing and waning of her oral intake. Family given the option of feeding tube which they do not want at this point.      
89 y/o female presented to ED due to AMS found to have UTI:        AMS secondary to metabolic encelopahty  secondary to uti  -c/w rocephine 1 gm qd   -F/u urine cx and sensitivity  -gentle IV hydration  -monitor fever curve and if develops any despite on antibiotics for 48 hours then will have low threshold to obtain CT abd/pel   -F/u blood cx  -PT/OT eval     Afib:  -C/w metoprolol succ 25 mg qd  -C/w eliquis 2.5 mg bid  -monitor on tele     HLD:  -C/w lipitor 20 mg qd    code status: Full (discussed with pt at bedside)  
89 y/o female presented to ED due to AMS found to have UTI:      AMS secondary to metabolic encephalopathy  secondary to uti  mental status improved  -continue Rocephin 1gm IV daily  -urine cx: ECOLI f/u sensitivity  -gentle IV hydration  -monitor fever curve and if develops any despite on antibiotics for 48 hours then will have low threshold to obtain CT abd/pel   -F/u blood cx  -PT/OT eval   -puree diet  -aspiration precaution    Afib  -C/w metoprolol succ 25 mg qd  -C/w eliquis 2.5 mg bid  -monitor on tele     Hypokalemia/hypernatremia  improved  fluids changed to 1/2NS with 20meq KCl 75cc/hr     HLD  -C/w lipitor 20 mg qd    code status: Full  PT: [facility]  DVT ppx: already on Eliquis for AFIB  Dispo: [facility] in 1-2 days pending urine culture sensitivities  
89F admitted for AMS found to have UTI     Acute metabolic encephalopathy  E.coli UTI   Completed 5 day course of ceftriaxone on 4/11  Stable for dismissal from medical standpoint  Pt will need DME at home prior to dismissal (DME not available to be delivered this weekend). DC will depend on once these available at home. Refer to CM notes for further details       GERD   Continue protonix 40mg and PRN maalox     Geriatric fraily/cachexia   Add remeron for apetite stimulation   Ensure TID w meals  Assist with meals, as needed   Palliative care consulted      Afib  -C/w metoprolol succ 25 mg qd  -C/w eliquis 2.5 mg bid    HLD  -C/w lipitor 20 mg qd    Code status discussed with daughter, wishes for full code      
89F admitted for AMS found to have UTI     Acute metabolic encephalopathy  E.coli UTI   Completed 5 day course of ceftriaxone on 4/11  Stable for dismissal from medical standpoint  Pt will need DME at home prior to dismissal (DME not available to be delivered this weekend). DC will depend on once these available at home. Refer to CM notes for further details     GERD   Continue protonix 40mg and PRN maalox     Geriatric fraily/cachexia   Add remeron for apetite stimulation   Ensure TID w meals  Assist with meals, as needed   Palliative care consulted      Afib  -C/w metoprolol succ 25 mg qd  -C/w eliquis 2.5 mg bid    HLD  -C/w lipitor 20 mg qd    Code status discussed with daughter, wishes for full code      
89 y/o female presented to ED due to AMS found to have UTI:      AMS secondary to metabolic encephalopathy  secondary to uti  mental status improved  -continue Rocephin 1gm IV daily  -urine cx: ECOLI pan sensitive  -gentle IV hydration  -F/u blood cx  -PT/OT eval   -puree diet  -aspiration precaution    Afib  -C/w metoprolol succ 25 mg qd  -C/w eliquis 2.5 mg bid  -monitor on tele     Hypokalemia/hypernatremia  improved  fluids changed to 1/2NS with 20meq KCl 75cc/hr   Encourage oral free water intake    HLD  -C/w lipitor 20 mg qd    code status: Full  PT: [facility]  DVT ppx: already on Eliquis for AFIB  Dispo: [facility] in 1-2 days pending urine culture sensitivities  
89 y/o female presented to ED due to AMS found to have UTI:    AMS  metabolic encephalopathy  secondary to uti   -c/w Rocephin 1 gm qd   -F/u urine cx and sensitivity  -gentle IV hydration    depression  citalopram      Afib:  -C/w metoprolol succ 25 mg qd  -C/w eliquis 2.5 mg bid  -monitor on tele     HLD:  -C/w lipitor 20 mg qd    time spent 35 minutes     code status: Full (discussed with pt at bedside)  
Pt is a 89W w/ PMHx of TIA, Afib on eliquis, dementia, UTI presenting for AMS--visual hallucinations  S/p outpatient keflex w/ improvement, however AMS recurred    AMS likely in setting of Acute Cystitis  Afebrile, no leukocytosis  grossly positive UA  UCx--E.coli; susceptibilities reviewed    Recommendations:   C/w ceftriaxone x5 day course, last day today  Trend temps/WBC    Infectious Diseases will continue to follow. Please call with any questions.   Gloria Gallegos M.D.  [facility] Division of Infectious Diseases [phone]  For after 5 P.M. and weekends, please call [phone]  
89F admitted for AMS found to have UTI     Acute metabolic encephalopathy  E.coli UTI   Completed 5 day course of ceftriaxone on 4/11  Stable for dismissal from medical standpoint  Pt will need DME at home prior to dismissal (DME not available to be delivered this weekend). DC will depend on once these available at home. Refer to CM notes for further details     GERD   Continue protonix 40mg and PRN maalox     Geriatric fraily/cachexia   Add remeron for apetite stimulation   Ensure TID w meals  Assist with meals, as needed   Palliative care consulted      Afib  - Remains Tachycardic  - Unable to take oral medications at this time so will order Lopressor 5mg Q6H  - C/w eliquis 2.5 mg bid    HLD  -C/w lipitor 20 mg qd    Code status discussed with daughter and she is DNR/DNI and possible transition to Hospice   Will need to have further discussion     
89F admitted for AMS found to have UTI     Acute metabolic encephalopathy  E.coli UTI   Completed 5 day course of ceftriaxone on 4/11  Stable for dismissal from medical standpoint  Pt will need DME at home prior to dismissal (DME not available to be delivered this weekend). DC will depend on once these available at home. Refer to CM notes for further details     GERD   Continue protonix 40mg and PRN maalox     Geriatric fraily/cachexia   Add remeron for apetite stimulation   Ensure TID w meals  Assist with meals, as needed   Palliative care consulted      Afib  -C/w metoprolol succ 25 mg qd  -C/w eliquis 2.5 mg bid    HLD  -C/w lipitor 20 mg qd    Code status discussed with daughter, wishes for full code      
89F admitted for AMS found to have UTI     Acute metabolic encephalopathy  E.coli UTI   Completed 5 day course of ceftriaxone on 4/11  Stable for dismissal from medical standpoint  Pt will need DME at home prior to dismissal. DC will depend on once these available at home. Refer to CM notes for further details     GERD   Continue protonix 40mg and PRN maalox     Geriatric fraily/cachexia   Add remeron for apetite stimulation   Ensure TID w meals  Assist with meals, as needed   Palliative care consulted      Afib  - Remains Tachycardic  - Unable to take oral medications at this time so will order Lopressor 5mg Q6H  - C/w eliquis 2.5 mg bid    HLD  -C/w lipitor 20 mg qd    Code status discussed with daughter and she is DNR/DNI and possible transition to Hospice   Will need to have further discussion    Patient is high likelihood for readmission due to waxing and waning of her oral intake. Family given the option of feeding tube which they do not want at this point.      
89F admitted for AMS found to have UTI     Acute metabolic encephalopathy  E.coli UTI   s/p ceftriaxone x5 day course (last day today)   Clinically improved  Plan for dismissal home with [facility] tomorrow, 4/12  Ongoing management discussed with daughter     Afib  -C/w metoprolol succ 25 mg qd  -C/w eliquis 2.5 mg bid    HLD  -C/w lipitor 20 mg qd    code status: Full     
Pt is a 89W w/ PMHx of TIA, Afib on eliquis, dementia, UTI presenting for AMS--visual hallucinations  S/p outpatient keflex w/ improvement, however AMS recurred    AMS likely in setting of Acute Cystitis  Afebrile, no leukocytosis  grossly positive UA  UCx--E.coli; susceptibilities reviewed    Recommendations:   S/p ceftriaxone x5 day course  Monitor off Abx  Trend temps/WBC  Additional care per primary team    Infectious Diseases will continue to follow. Please call with any questions.   Gloria Gallegos M.D.  [facility] Division of Infectious Diseases [phone]  For after 5 P.M. and weekends, please call [phone]  
Pt is a 89W w/ PMHx of TIA, Afib on eliquis, dementia, UTI presenting for AMS--visual hallucinations  S/p outpatient keflex w/ improvement, however AMS recurred    AMS likely in setting of Acute Cystitis  Afebrile, no leukocytosis  grossly positive UA  UCx--E.coli; susceptibilities reviewed    Recommendations:   S/p ceftriaxone x5 day course--completed  Monitor off Abx  Trend temps/WBC  Additional care per primary team    Stable from ID standpoint  D/c planning per primary team    Infectious Diseases will sign off.   Please call with any questions.   Gloria Gallegos M.D.  [facility] Division of Infectious Diseases [phone]  For after 5 P.M. and weekends, please call [phone]  
Pt is a 89W w/ PMHx of TIA, Afib on eliquis, dementia, UTI presenting for AMS--visual hallucinations  S/p outpatient keflex w/ improvement, however AMS recurred    AMS likely in setting of Acute Cystitis  Afebrile, no leukocytosis  grossly positive UA  Was found to have recurrent acute cystitis, started on ceftriaxone    Recommendations:   C/w ceftriaxone  F/u UCx--E.coli; susceptibilities pending  Trend temps/WBC  Further recs to follow pending above    D/w Dr. Ventura  Infectious Diseases will continue to follow. Please call with any questions.   Gloria Gallegos M.D.  [facility] Division of Infectious Diseases [phone]  For after 5 P.M. and weekends, please call [phone]

## 2024-04-20 NOTE — PROGRESS NOTE ADULT - PROVIDER SPECIALTY LIST ADULT
Infectious Disease
Hospitalist
Infectious Disease
Hospitalist

## 2024-04-20 NOTE — PROGRESS NOTE ADULT - SUBJECTIVE AND OBJECTIVE BOX
[facility], Division of Infectious Diseases  CHRISTY aTvarez Y. Patel, S. Shah, G. [facility]  [phone]    Name: JEN REICH  Age: 89y  Gender: Female  MRN: [id number]    Interval History:  Patient seen and examined at bedside this morning  No acute overnight events.   Notes reviewed    Antibiotics:      Medications:  acetaminophen     Tablet .. 650 milliGRAM(s) Oral every 6 hours PRN  aluminum hydroxide/magnesium hydroxide/simethicone Suspension 30 milliLiter(s) Oral every 4 hours PRN  amLODIPine   Tablet 5 milliGRAM(s) Oral daily  apixaban 2.5 milliGRAM(s) Oral two times a day  atorvastatin 20 milliGRAM(s) Oral at bedtime  citalopram 20 milliGRAM(s) Oral daily  cyanocobalamin 1000 MICROGram(s) Oral daily  melatonin 3 milliGRAM(s) Oral at bedtime PRN  metoprolol succinate ER 25 milliGRAM(s) Oral daily  ondansetron Injectable 4 milliGRAM(s) IV Push every 8 hours PRN  sodium chloride 0.45% with potassium chloride 20 mEq/L 1000 milliLiter(s) IV Continuous <Continuous>      Review of Systems:  A 10-point review of systems was obtained.   Review of systems otherwise negative except as previously noted.    Allergies: No Known Allergies    For details regarding the patient's past medical history, social history, family history, and other miscellaneous elements, please refer the initial infectious diseases consultation and/or the admitting history and physical examination for this admission.    Objective:  Vitals:   T(C): 36.8 (04-11-24 @ 04:49), Max: 36.8 (04-11-24 @ 04:49)  HR: 105 (04-11-24 @ 04:49) (83 - 105)  BP: 165/97 (04-11-24 @ 04:49) (140/122 - 170/78)  RR: 17 (04-11-24 @ 04:49) (17 - 25)  SpO2: 95% (04-11-24 @ 04:49) (92% - 96%)    Physical Examination:  General: no acute distress  HEENT: NC/AT, EOMI,  Cardio: S1, S2 heard, RRR, no murmurs  Resp: breath sounds heard bilaterally, no rales, wheezes or rhonchi  Abd: soft, NT, ND,  Ext: no edema or cyanosis  Skin: warm, dry, no visible rash      Laboratory Studies:  CBC:                       12.4   7.77  )-----------( 216      ( 10 Apr 2024 06:00 )             37.1     CMP: 04-10    146<H>  |  110<H>  |  5<L>  ----------------------------<  81  3.8   |  26  |  0.55    Ca    8.9      10 Apr 2024 06:00  Phos  3.2     04-10  Mg     1.5     04-10        Urinalysis Basic - ( 10 Apr 2024 06:00 )    Color: x / Appearance: x / SG: x / pH: x  Gluc: 81 mg/dL / Ketone: x  / Bili: x / Urobili: x   Blood: x / Protein: x / Nitrite: x   Leuk Esterase: x / RBC: x / WBC x   Sq Epi: x / Non Sq Epi: x / Bacteria: x        Microbiology: reviewed    Culture - Blood (collected 04-06-24 @ 01:05)  Source: .Blood Blood-Peripheral  Preliminary Report (04-10-24 @ 13:01):    No growth at 4 days    Culture - Blood (collected 04-06-24 @ 01:05)  Source: .Blood Blood-Peripheral  Preliminary Report (04-10-24 @ 13:01):    No growth at 4 days    Culture - Urine (collected 04-06-24 @ 01:05)  Source: Clean Catch Clean Catch (Midstream)  Final Report (04-09-24 @ 15:18):    >100,000 CFU/ml Escherichia coli    <10,000 CFU/ml Normal Urogenital grant present  Organism: Escherichia coli (04-09-24 @ 15:18)  Organism: Escherichia coli (04-09-24 @ 15:18)      Method Type: CHANTELL      -  Amoxicillin/Clavulanic Acid: S <=8/4      -  Ampicillin: R >16 These ampicillin results predict results for amoxicillin      -  Ampicillin/Sulbactam: I 16/8      -  Aztreonam: S <=4      -  Cefazolin: S <=2 For uncomplicated UTI with K. pneumoniae, E. coli, or P. mirablis: CHANTELL <=16 is sensitive and CHANTELL >=32 is resistant. This also predicts results for oral agents cefaclor, cefdinir, cefpodoxime, cefprozil, cefuroxime axetil, cephalexin and locarbef for uncomplicated UTI. Note that some isolates may be susceptible to these agents while testing resistant to cefazolin.      -  Cefepime: S <=2      -  Cefoxitin: S <=8      -  Ceftriaxone: S <=1      -  Cefuroxime: S <=4      -  Ciprofloxacin: I 0.5      -  Ertapenem: S <=0.5      -  Gentamicin: S <=2      -  Imipenem: S <=1      -  Levofloxacin: S <=0.5      -  Meropenem: S <=1      -  Nitrofurantoin: S <=32 Should not be used to treat pyelonephritis      -  Piperacillin/Tazobactam: S <=8      -  Tobramycin: S <=2      -  Trimethoprim/Sulfamethoxazole: R >2/38          Radiology: reviewed      
Patient is a 89y old  Female who presents with a chief complaint of AMS (06 Apr 2024 02:16)        HPI:  89 y/o female with PMH of TIA, Afib on eliquis, dementia, UTI who presented to ED to be evaluated for AMS. pt lives home with her family and gets around by means of walker. about 20 days ago pt noted to have visual hallucination. per daughter pt saw little girl in room. pt was taken to her PCP as UTI had led to such symptoms in past. pt was placed on 10 days of keflex with improvement. pt had F/u appt with her PCP about 1 week ago where everything including urine cx was reported to be normal. in last 2 days pt became more confused. trying to get out of bed at night on her own and starts walking w/o walker. per daughter pt have been more weak and lethargy during day time. pt has home health visiting 6 days/week only during daytime. daughter thinks that pt is still having UTI and was BIBA.  pt seems to ne pleasantly confused upon my initial evaluation. currently A and O X 3. pt does not recal any visual or auditory hallucination pt is not sure why she is here  denies any abdominal pain, nausea,vomiting, fever,chills or any urinary symptoms     pt found to have UTI in ED for which she is to be admitted       (06 Apr 2024 02:16)      SUBJECTIVE & OBJECTIVE: Pt seen and examined at bedside. nad    PHYSICAL EXAM:  T(C): 36.4 (04-06-24 @ 07:59), Max: 37.6 (04-06-24 @ 00:33)  HR: 70 (04-06-24 @ 07:59) (70 - 118)  BP: 135/78 (04-06-24 @ 07:59) (135/78 - 157/80)  RR: 18 (04-06-24 @ 07:59) (18 - 20)  SpO2: 97% (04-06-24 @ 07:59) (94% - 97%)  Wt(kg): -- Height (cm): 165.1 (04-06 @ 00:33)  Weight (kg): 63.3 (04-06 @ 00:33)  BMI (kg/m2): 23.2 (04-06 @ 00:33)  BSA (m2): 1.7 (04-06 @ 00:33)  GENERAL: NAD, well-groomed, well-developed  NECK: Supple, No JVD  NERVOUS SYSTEM:  Alert & Oriented   CHEST/LUNG: Clear to auscultation bilaterally; No rales, rhonchi, wheezing, or rubs  HEART: Regular rate and rhythm; No murmurs, rubs, or gallops  ABDOMEN: Soft, Nontender, Nondistended; Bowel sounds present  EXTREMITIES:  2+ Peripheral Pulses, No clubbing, cyanosis, or edema        MEDICATIONS  (STANDING):  apixaban 2.5 milliGRAM(s) Oral two times a day  atorvastatin 20 milliGRAM(s) Oral at bedtime  cefTRIAXone   IVPB 1000 milliGRAM(s) IV Intermittent every 24 hours  citalopram 20 milliGRAM(s) Oral daily  cyanocobalamin 1000 MICROGram(s) Oral daily  metoprolol succinate ER 25 milliGRAM(s) Oral daily  sodium chloride 0.9%. 1000 milliLiter(s) (84 mL/Hr) IV Continuous <Continuous>    MEDICATIONS  (PRN):  acetaminophen     Tablet .. 650 milliGRAM(s) Oral every 6 hours PRN Temp greater or equal to 38C (100.4F), Mild Pain (1 - 3)  aluminum hydroxide/magnesium hydroxide/simethicone Suspension 30 milliLiter(s) Oral every 4 hours PRN Dyspepsia  melatonin 3 milliGRAM(s) Oral at bedtime PRN Insomnia  ondansetron Injectable 4 milliGRAM(s) IV Push every 8 hours PRN Nausea and/or Vomiting      LABS:                        11.5   7.36  )-----------( 197      ( 06 Apr 2024 05:45 )             35.5     04-06    142  |  107  |  17  ----------------------------<  92  3.5   |  26  |  0.67    Ca    8.1<L>      06 Apr 2024 05:45    TPro  6.7  /  Alb  3.2<L>  /  TBili  0.6  /  DBili  x   /  AST  19  /  ALT  17  /  AlkPhos  74  04-06    PT/INR - ( 06 Apr 2024 01:05 )   PT: 15.7 sec;   INR: 1.46 ratio         PTT - ( 06 Apr 2024 01:05 )  PTT:36.9 sec  Urinalysis Basic - ( 06 Apr 2024 05:45 )    Color: x / Appearance: x / SG: x / pH: x  Gluc: 92 mg/dL / Ketone: x  / Bili: x / Urobili: x   Blood: x / Protein: x / Nitrite: x   Leuk Esterase: x / RBC: x / WBC x   Sq Epi: x / Non Sq Epi: x / Bacteria: x        CAPILLARY BLOOD GLUCOSE          CAPILLARY BLOOD GLUCOSE        CAPILLARY BLOOD GLUCOSE                RECENT CULTURES:      RADIOLOGY & ADDITIONAL TESTS:                        DVT/GI ppx  Discussed with pt @ bedside
Patient is a 89y old  Female who presents with a chief complaint of AMS (14 Apr 2024 10:28)        HPI:  89 y/o female with PMH of TIA, Afib on eliquis, dementia, UTI who presented to ED to be evaluated for AMS. pt lives home with her family and gets around by means of walker. about 20 days ago pt noted to have visual hallucination. per daughter pt saw little girl in room. pt was taken to her PCP as UTI had led to such symptoms in past. pt was placed on 10 days of keflex with improvement. pt had F/u appt with her PCP about 1 week ago where everything including urine cx was reported to be normal. in last 2 days pt became more confused. trying to get out of bed at night on her own and starts walking w/o walker. per daughter pt have been more weak and lethargy during day time. pt has home health visiting 6 days/week only during daytime. daughter thinks that pt is still having UTI and was BIBA.  pt seems to ne pleasantly confused upon my initial evaluation. currently A and O X 3. pt does not recal any visual or auditory hallucination pt is not sure why she is here  denies any abdominal pain, nausea,vomiting, fever,chills or any urinary symptoms     pt found to have UTI in ED for which she is to be admitted       (06 Apr 2024 02:16)      SUBJECTIVE & OBJECTIVE: Pt seen and examined at bedside. nad    PHYSICAL EXAM:  T(C): 36.7 (04-15-24 @ 11:57), Max: 36.9 (04-15-24 @ 07:26)  HR: 79 (04-15-24 @ 11:57) (75 - 100)  BP: 145/74 (04-15-24 @ 11:57) (127/67 - 151/61)  RR: 17 (04-15-24 @ 11:57) (16 - 18)  SpO2: 97% (04-15-24 @ 11:57) (97% - 99%)  Wt(kg): --   GENERAL: NAD,   NERVOUS SYSTEM:  Alert   CHEST/LUNG: decrease air netr yathe bases   HEART: Regular rate and rhythm; No murmurs, rubs, or gallops  ABDOMEN: Soft, Nontender, Nondistended; Bowel sounds present  EXTREMITIES:  2+ Peripheral Pulses, No clubbing, cyanosis, or edema        MEDICATIONS  (STANDING):  amLODIPine   Tablet 5 milliGRAM(s) Oral daily  apixaban 2.5 milliGRAM(s) Oral two times a day  aspirin Suppository 300 milliGRAM(s) Rectal daily  atorvastatin 20 milliGRAM(s) Oral at bedtime  citalopram 20 milliGRAM(s) Oral daily  cyanocobalamin 1000 MICROGram(s) Oral daily  enoxaparin Injectable 30 milliGRAM(s) SubCutaneous every 24 hours  lactated ringers. 1000 milliLiter(s) (125 mL/Hr) IV Continuous <Continuous>  metoprolol tartrate Injectable 5 milliGRAM(s) IV Push every 6 hours  mirtazapine 15 milliGRAM(s) Oral at bedtime  pantoprazole  Injectable 40 milliGRAM(s) IV Push daily    MEDICATIONS  (PRN):  acetaminophen     Tablet .. 650 milliGRAM(s) Oral every 6 hours PRN Temp greater or equal to 38C (100.4F), Mild Pain (1 - 3)  aluminum hydroxide/magnesium hydroxide/simethicone Suspension 30 milliLiter(s) Oral every 4 hours PRN Dyspepsia  melatonin 3 milliGRAM(s) Oral at bedtime PRN Insomnia  ondansetron Injectable 4 milliGRAM(s) IV Push every 8 hours PRN Nausea and/or Vomiting      LABS:                CAPILLARY BLOOD GLUCOSE          CAPILLARY BLOOD GLUCOSE        CAPILLARY BLOOD GLUCOSE      POCT Blood Glucose.: 75 mg/dL (13 Apr 2024 18:54)            RECENT CULTURES:      RADIOLOGY & ADDITIONAL TESTS:                        DVT/GI ppx  Discussed with pt @ bedside
Subjective: Patient seen and examined.  Awake today and had her breakfast with meds given. There are times of somnolence.     MEDICATIONS  (STANDING):  amLODIPine   Tablet 5 milliGRAM(s) Oral daily  apixaban 2.5 milliGRAM(s) Oral two times a day  aspirin Suppository 300 milliGRAM(s) Rectal daily  atorvastatin 20 milliGRAM(s) Oral at bedtime  citalopram 20 milliGRAM(s) Oral daily  cyanocobalamin 1000 MICROGram(s) Oral daily  enoxaparin Injectable 30 milliGRAM(s) SubCutaneous every 24 hours  lactated ringers. 1000 milliLiter(s) (125 mL/Hr) IV Continuous <Continuous>  metoprolol tartrate 50 milliGRAM(s) Oral two times a day  mirtazapine 15 milliGRAM(s) Oral at bedtime  pantoprazole  Injectable 40 milliGRAM(s) IV Push daily    MEDICATIONS  (PRN):  acetaminophen     Tablet .. 650 milliGRAM(s) Oral every 6 hours PRN Temp greater or equal to 38C (100.4F), Mild Pain (1 - 3)  aluminum hydroxide/magnesium hydroxide/simethicone Suspension 30 milliLiter(s) Oral every 4 hours PRN Dyspepsia  melatonin 3 milliGRAM(s) Oral at bedtime PRN Insomnia  ondansetron Injectable 4 milliGRAM(s) IV Push every 8 hours PRN Nausea and/or Vomiting      Allergies    No Known Allergies    Intolerances        Vital Signs Last 24 Hrs  T(C): 36.4 (18 Apr 2024 08:22), Max: 36.9 (17 Apr 2024 15:35)  T(F): 97.5 (18 Apr 2024 08:22), Max: 98.4 (17 Apr 2024 15:35)  HR: 75 (18 Apr 2024 08:22) (72 - 87)  BP: 130/80 (18 Apr 2024 08:22) (112/78 - 131/81)  BP(mean): --  RR: 16 (18 Apr 2024 08:22) (16 - 18)  SpO2: 98% (18 Apr 2024 08:22) (98% - 100%)    Parameters below as of 18 Apr 2024 08:22  Patient On (Oxygen Delivery Method): room air        PHYSICAL EXAM:  GENERAL: NAD, well-groomed, well-developed  HEAD:  Atraumatic, Normocephalic  ENMT: Moist mucous membranes,   NECK: Supple, No JVD, Normal thyroid  NERVOUS SYSTEM:  All 4 extremities mobile, no gross sensory deficits.   CHEST/LUNG: Clear to auscultation bilaterally; No rales, rhonchi, wheezing, or rubs  HEART: Regular rate and rhythm; No murmurs, rubs, or gallops  ABDOMEN: Soft, Nontender, Nondistended; Bowel sounds present  EXTREMITIES:  2+ Peripheral Pulses, No clubbing, cyanosis, or edema      LABS:              CAPILLARY BLOOD GLUCOSE          RADIOLOGY & ADDITIONAL TESTS:    Imaging Personally Reviewed:  [ ] YES     Consultant(s) Notes Reviewed:      Care Discussed with Consultants/Other Providers:    Advanced Directives: [ ] DNR  [ ] No feeding tube  [ ] MOLST in chart  [ ] MOLST completed today  [ ] Unknown  
Subjective: Patient seen and examined. No overnight events. Doing well. Wants to go home.     MEDICATIONS  (STANDING):  amLODIPine   Tablet 5 milliGRAM(s) Oral daily  apixaban 2.5 milliGRAM(s) Oral two times a day  aspirin Suppository 300 milliGRAM(s) Rectal daily  atorvastatin 20 milliGRAM(s) Oral at bedtime  citalopram 20 milliGRAM(s) Oral daily  cyanocobalamin 1000 MICROGram(s) Oral daily  enoxaparin Injectable 30 milliGRAM(s) SubCutaneous every 24 hours  lactated ringers. 1000 milliLiter(s) (125 mL/Hr) IV Continuous <Continuous>  metoprolol tartrate Injectable 5 milliGRAM(s) IV Push every 6 hours  mirtazapine 15 milliGRAM(s) Oral at bedtime  pantoprazole  Injectable 40 milliGRAM(s) IV Push daily    MEDICATIONS  (PRN):  acetaminophen     Tablet .. 650 milliGRAM(s) Oral every 6 hours PRN Temp greater or equal to 38C (100.4F), Mild Pain (1 - 3)  aluminum hydroxide/magnesium hydroxide/simethicone Suspension 30 milliLiter(s) Oral every 4 hours PRN Dyspepsia  melatonin 3 milliGRAM(s) Oral at bedtime PRN Insomnia  ondansetron Injectable 4 milliGRAM(s) IV Push every 8 hours PRN Nausea and/or Vomiting      Allergies    No Known Allergies    Intolerances        Vital Signs Last 24 Hrs  T(C): 36.7 (16 Apr 2024 07:52), Max: 36.7 (15 Apr 2024 11:57)  T(F): 98 (16 Apr 2024 07:52), Max: 98.1 (15 Apr 2024 11:57)  HR: 125 (16 Apr 2024 07:52) (67 - 125)  BP: 129/89 (16 Apr 2024 07:52) (115/76 - 148/80)  BP(mean): --  RR: 17 (16 Apr 2024 07:52) (16 - 18)  SpO2: 97% (16 Apr 2024 07:52) (96% - 99%)    Parameters below as of 16 Apr 2024 07:52  Patient On (Oxygen Delivery Method): nasal cannula        PHYSICAL EXAM:  GENERAL: NAD, Tachycardic   HEAD:  Atraumatic, Normocephalic  ENMT: Moist mucous membranes,   NECK: Supple, No JVD, Normal thyroid  NERVOUS SYSTEM:  All 4 extremities mobile, no gross sensory deficits.   CHEST/LUNG: Clear to auscultation bilaterally; No rales, rhonchi, wheezing, or rubs  HEART: Regular rate and rhythm; No murmurs, rubs, or gallops  ABDOMEN: Soft, Nontender, Nondistended; Bowel sounds present  EXTREMITIES:  2+ Peripheral Pulses, No clubbing, cyanosis, or edema      LABS:              CAPILLARY BLOOD GLUCOSE          RADIOLOGY & ADDITIONAL TESTS:    Imaging Personally Reviewed:  [ ] YES     Consultant(s) Notes Reviewed:      Care Discussed with Consultants/Other Providers:    Advanced Directives: [ ] DNR  [ ] No feeding tube  [ ] MOLST in chart  [ ] MOLST completed today  [ ] Unknown  
Yesterday, RRT called for chest pain  Cardiac work up (ekg, troponinx2 negative) and no events on telemetry  Pts symptoms improved with GI cocktail.     MEDICATIONS  (STANDING):  amLODIPine   Tablet 5 milliGRAM(s) Oral daily  apixaban 2.5 milliGRAM(s) Oral two times a day  atorvastatin 20 milliGRAM(s) Oral at bedtime  cefTRIAXone   IVPB 1000 milliGRAM(s) IV Intermittent every 24 hours  citalopram 20 milliGRAM(s) Oral daily  cyanocobalamin 1000 MICROGram(s) Oral daily  metoprolol succinate ER 25 milliGRAM(s) Oral daily  sodium chloride 0.45% with potassium chloride 20 mEq/L 1000 milliLiter(s) (75 mL/Hr) IV Continuous <Continuous>    MEDICATIONS  (PRN):  acetaminophen     Tablet .. 650 milliGRAM(s) Oral every 6 hours PRN Temp greater or equal to 38C (100.4F), Mild Pain (1 - 3)  aluminum hydroxide/magnesium hydroxide/simethicone Suspension 30 milliLiter(s) Oral every 4 hours PRN Dyspepsia  melatonin 3 milliGRAM(s) Oral at bedtime PRN Insomnia  ondansetron Injectable 4 milliGRAM(s) IV Push every 8 hours PRN Nausea and/or Vomiting      Allergies    No Known Allergies    Intolerances        Vital Signs Last 24 Hrs  T(C): 36.7 (10 Apr 2024 08:03), Max: 36.8 (09 Apr 2024 20:43)  T(F): 98.1 (10 Apr 2024 08:03), Max: 98.3 (09 Apr 2024 20:43)  HR: 98 (10 Apr 2024 08:03) (73 - 137)  BP: 140/122 (10 Apr 2024 08:03) (140/80 - 159/99)  BP(mean): 129 (10 Apr 2024 08:03) (82 - 129)  RR: 25 (10 Apr 2024 08:03) (20 - 38)  SpO2: 95% (10 Apr 2024 08:03) (93% - 98%)    Parameters below as of 10 Apr 2024 04:00  Patient On (Oxygen Delivery Method): room air        PHYSICAL EXAM:  GENERAL: weak but easily arousable at time of my exam  HEAD:  Atraumatic, Normocephalic  NECK: Supple, No JVD, Normal thyroid  NERVOUS SYSTEM:  All 4 extremities mobile no focal deficits   CHEST/LUNG: Clear to auscultation bilaterally; No rales, rhonchi, wheezing, or rubs, no ttp  HEART: Regular rate and rhythm; No murmurs, rubs, or gallops  ABDOMEN: Soft, Nontender, Nondistended; Bowel sounds present  EXTREMITIES:  no LE or calf swelling       LABS:                        12.4   7.77  )-----------( 216      ( 10 Apr 2024 06:00 )             37.1     10 Apr 2024 06:00    146    |  110    |  5      ----------------------------<  81     3.8     |  26     |  0.55     Ca    8.9        10 Apr 2024 06:00  Phos  3.2       10 Apr 2024 06:00  Mg     1.5       10 Apr 2024 06:00        Urinalysis Basic - ( 10 Apr 2024 06:00 )    Color: x / Appearance: x / SG: x / pH: x  Gluc: 81 mg/dL / Ketone: x  / Bili: x / Urobili: x   Blood: x / Protein: x / Nitrite: x   Leuk Esterase: x / RBC: x / WBC x   Sq Epi: x / Non Sq Epi: x / Bacteria: x      CAPILLARY BLOOD GLUCOSE          RADIOLOGY & ADDITIONAL TESTS:    Imaging Personally Reviewed:  [ ] YES     Consultant(s) Notes Reviewed:      Care Discussed with Consultants/Other Providers:    Advanced Directives: [ ] DNR  [ ] No feeding tube  [ ] MOLST in chart  [ ] MOLST completed today  [ ] Unknown  
No acute overnight events  This morning, the pt was calm and cooperative and responded appropriately to simple questions    MEDICATIONS  (STANDING):  amLODIPine   Tablet 5 milliGRAM(s) Oral daily  apixaban 2.5 milliGRAM(s) Oral two times a day  atorvastatin 20 milliGRAM(s) Oral at bedtime  cefTRIAXone   IVPB 1000 milliGRAM(s) IV Intermittent every 24 hours  citalopram 20 milliGRAM(s) Oral daily  cyanocobalamin 1000 MICROGram(s) Oral daily  metoprolol succinate ER 25 milliGRAM(s) Oral daily  sodium chloride 0.45% with potassium chloride 20 mEq/L 1000 milliLiter(s) (75 mL/Hr) IV Continuous <Continuous>    MEDICATIONS  (PRN):  acetaminophen     Tablet .. 650 milliGRAM(s) Oral every 6 hours PRN Temp greater or equal to 38C (100.4F), Mild Pain (1 - 3)  aluminum hydroxide/magnesium hydroxide/simethicone Suspension 30 milliLiter(s) Oral every 4 hours PRN Dyspepsia  melatonin 3 milliGRAM(s) Oral at bedtime PRN Insomnia  ondansetron Injectable 4 milliGRAM(s) IV Push every 8 hours PRN Nausea and/or Vomiting      Allergies    No Known Allergies    Intolerances        Vital Signs Last 24 Hrs  T(C): 36.7 (10 Apr 2024 08:03), Max: 36.8 (09 Apr 2024 20:43)  T(F): 98.1 (10 Apr 2024 08:03), Max: 98.3 (09 Apr 2024 20:43)  HR: 98 (10 Apr 2024 08:03) (73 - 137)  BP: 140/122 (10 Apr 2024 08:03) (140/80 - 159/99)  BP(mean): 129 (10 Apr 2024 08:03) (82 - 129)  RR: 25 (10 Apr 2024 08:03) (20 - 38)  SpO2: 95% (10 Apr 2024 08:03) (93% - 98%)    Parameters below as of 10 Apr 2024 04:00  Patient On (Oxygen Delivery Method): room air        PHYSICAL EXAM:  GENERAL: weak  HEAD:  Atraumatic, Normocephalic  NECK: Supple, No JVD, Normal thyroid  NERVOUS SYSTEM:  All 4 extremities mobile, no gross sensory deficits.   CHEST/LUNG: Clear to auscultation bilaterally; No rales, rhonchi, wheezing, or rubs  HEART: Regular rate and rhythm; No murmurs, rubs, or gallops  ABDOMEN: Soft, Nontender, Nondistended; Bowel sounds present  EXTREMITIES:  2+ Peripheral Pulses, No clubbing, cyanosis, or edema      LABS:                        12.4   7.77  )-----------( 216      ( 10 Apr 2024 06:00 )             37.1     10 Apr 2024 06:00    146    |  110    |  5      ----------------------------<  81     3.8     |  26     |  0.55     Ca    8.9        10 Apr 2024 06:00  Phos  3.2       10 Apr 2024 06:00  Mg     1.5       10 Apr 2024 06:00        Urinalysis Basic - ( 10 Apr 2024 06:00 )    Color: x / Appearance: x / SG: x / pH: x  Gluc: 81 mg/dL / Ketone: x  / Bili: x / Urobili: x   Blood: x / Protein: x / Nitrite: x   Leuk Esterase: x / RBC: x / WBC x   Sq Epi: x / Non Sq Epi: x / Bacteria: x      CAPILLARY BLOOD GLUCOSE          RADIOLOGY & ADDITIONAL TESTS:    Imaging Personally Reviewed:  [ ] YES     Consultant(s) Notes Reviewed:      Care Discussed with Consultants/Other Providers:    Advanced Directives: [ ] DNR  [ ] No feeding tube  [ ] MOLST in chart  [ ] MOLST completed today  [ ] Unknown  
Subjective: NO events overnight.     MEDICATIONS  (STANDING):  amLODIPine   Tablet 5 milliGRAM(s) Oral daily  apixaban 2.5 milliGRAM(s) Oral two times a day  aspirin  chewable 81 milliGRAM(s) Oral daily  atorvastatin 20 milliGRAM(s) Oral at bedtime  citalopram 20 milliGRAM(s) Oral daily  cyanocobalamin 1000 MICROGram(s) Oral daily  enoxaparin Injectable 30 milliGRAM(s) SubCutaneous every 24 hours  lactated ringers. 1000 milliLiter(s) (125 mL/Hr) IV Continuous <Continuous>  metoprolol tartrate 50 milliGRAM(s) Oral two times a day  mirtazapine 15 milliGRAM(s) Oral at bedtime  pantoprazole  Injectable 40 milliGRAM(s) IV Push daily    MEDICATIONS  (PRN):  acetaminophen     Tablet .. 650 milliGRAM(s) Oral every 6 hours PRN Temp greater or equal to 38C (100.4F), Mild Pain (1 - 3)  aluminum hydroxide/magnesium hydroxide/simethicone Suspension 30 milliLiter(s) Oral every 4 hours PRN Dyspepsia  melatonin 3 milliGRAM(s) Oral at bedtime PRN Insomnia  ondansetron Injectable 4 milliGRAM(s) IV Push every 8 hours PRN Nausea and/or Vomiting      Allergies    No Known Allergies    Intolerances        Vital Signs Last 24 Hrs  T(C): 36.7 (20 Apr 2024 08:31), Max: 36.7 (20 Apr 2024 08:31)  T(F): 98.1 (20 Apr 2024 08:31), Max: 98.1 (20 Apr 2024 08:31)  HR: 77 (20 Apr 2024 08:31) (72 - 77)  BP: 144/80 (20 Apr 2024 08:31) (101/64 - 144/80)  BP(mean): --  RR: 18 (20 Apr 2024 08:31) (16 - 18)  SpO2: 95% (20 Apr 2024 08:31) (95% - 99%)        PHYSICAL EXAM:  GENERAL: NAD, well-groomed, well-developed  HEAD:  Atraumatic, Normocephalic  ENMT: Moist mucous membranes,   NECK: Supple, No JVD, Normal thyroid  NERVOUS SYSTEM:  All 4 extremities mobile, no gross sensory deficits.   CHEST/LUNG: Clear to auscultation bilaterally; No rales, rhonchi, wheezing, or rubs  HEART: Regular rate and rhythm; No murmurs, rubs, or gallops  ABDOMEN: Soft, Nontender, Nondistended; Bowel sounds present  EXTREMITIES:  2+ Peripheral Pulses, No clubbing, cyanosis, or edema      LABS:                        12.1   6.10  )-----------( 214      ( 20 Apr 2024 06:48 )             38.2     20 Apr 2024 06:48    143    |  104    |  8      ----------------------------<  99     4.2     |  32     |  0.47     Ca    8.9        20 Apr 2024 06:48        Urinalysis Basic - ( 20 Apr 2024 06:48 )    Color: x / Appearance: x / SG: x / pH: x  Gluc: 99 mg/dL / Ketone: x  / Bili: x / Urobili: x   Blood: x / Protein: x / Nitrite: x   Leuk Esterase: x / RBC: x / WBC x   Sq Epi: x / Non Sq Epi: x / Bacteria: x      CAPILLARY BLOOD GLUCOSE          RADIOLOGY & ADDITIONAL TESTS:    Imaging Personally Reviewed:  [ ] YES     Consultant(s) Notes Reviewed:      Care Discussed with Consultants/Other Providers:    Advanced Directives: [ ] DNR  [ ] No feeding tube  [ ] MOLST in chart  [ ] MOLST completed today  [ ] Unknown  
Subjective: Patient is doing well and feels better today, tolerating puree diet    MEDICATIONS  (STANDING):  apixaban 2.5 milliGRAM(s) Oral two times a day  atorvastatin 20 milliGRAM(s) Oral at bedtime  cefTRIAXone   IVPB 1000 milliGRAM(s) IV Intermittent every 24 hours  citalopram 20 milliGRAM(s) Oral daily  cyanocobalamin 1000 MICROGram(s) Oral daily  metoprolol succinate ER 25 milliGRAM(s) Oral daily  sodium chloride 0.45% with potassium chloride 20 mEq/L 1000 milliLiter(s) (75 mL/Hr) IV Continuous <Continuous>    MEDICATIONS  (PRN):  acetaminophen     Tablet .. 650 milliGRAM(s) Oral every 6 hours PRN Temp greater or equal to 38C (100.4F), Mild Pain (1 - 3)  aluminum hydroxide/magnesium hydroxide/simethicone Suspension 30 milliLiter(s) Oral every 4 hours PRN Dyspepsia  melatonin 3 milliGRAM(s) Oral at bedtime PRN Insomnia  ondansetron Injectable 4 milliGRAM(s) IV Push every 8 hours PRN Nausea and/or Vomiting      Allergies    No Known Allergies    Intolerances        Vital Signs Last 24 Hrs  T(C): 36.9 (08 Apr 2024 20:00), Max: 36.9 (08 Apr 2024 20:00)  T(F): 98.5 (08 Apr 2024 20:00), Max: 98.5 (08 Apr 2024 20:00)  HR: 71 (08 Apr 2024 20:00) (71 - 105)  BP: 137/65 (08 Apr 2024 20:00) (128/71 - 150/74)  BP(mean): 86 (08 Apr 2024 20:00) (77 - 110)  RR: 18 (08 Apr 2024 20:00) (14 - 27)  SpO2: 98% (08 Apr 2024 20:00) (95% - 98%)    Parameters below as of 08 Apr 2024 20:00  Patient On (Oxygen Delivery Method): room air        PHYSICAL EXAM:  GENERAL: NAD, well-groomed, well-developed  HEAD:  Atraumatic, Normocephalic  ENMT: Moist mucous membranes,   NECK: Supple, No JVD, Normal thyroid  NERVOUS SYSTEM:  All 4 extremities mobile, no gross sensory deficits.   CHEST/LUNG: Clear to auscultation bilaterally; No rales, rhonchi, wheezing, or rubs  HEART: irregular irregular rhythm, No murmurs, rubs, or gallops  ABDOMEN: Soft, Nontender, Nondistended; Bowel sounds present  EXTREMITIES:  2+ Peripheral Pulses, No clubbing, cyanosis, or edema      LABS:                        11.6   6.22  )-----------( 201      ( 08 Apr 2024 06:00 )             36.1     08 Apr 2024 06:00    146    |  112    |  7      ----------------------------<  77     3.2     |  24     |  0.57     Ca    8.4        08 Apr 2024 06:00    TPro  5.4    /  Alb  2.5    /  TBili  0.5    /  DBili  x      /  AST  15     /  ALT  11     /  AlkPhos  58     08 Apr 2024 06:00      Urinalysis Basic - ( 08 Apr 2024 06:00 )    Color: x / Appearance: x / SG: x / pH: x  Gluc: 77 mg/dL / Ketone: x  / Bili: x / Urobili: x   Blood: x / Protein: x / Nitrite: x   Leuk Esterase: x / RBC: x / WBC x   Sq Epi: x / Non Sq Epi: x / Bacteria: x      CAPILLARY BLOOD GLUCOSE          RADIOLOGY & ADDITIONAL TESTS:    Imaging Personally Reviewed:  [ ] YES     Consultant(s) Notes Reviewed:      Care Discussed with Consultants/Other Providers:    Advanced Directives: [ ] DNR  [ ] No feeding tube  [ ] MOLST in chart  [ ] MOLST completed today  [ ] Unknown  
[facility], Division of Infectious Diseases  CHRISTY Tavarez Y. Patel, S. Shah, G. [facility]  [phone]    Name: JEN REICH  Age: 89y  Gender: Female  MRN: [id number]    Interval History:  Patient seen and examined at bedside this morning  No acute overnight events. Afebrile  No complaints  Notes reviewed    Antibiotics:  cefTRIAXone   IVPB 1000 milliGRAM(s) IV Intermittent every 24 hours      Medications:  acetaminophen     Tablet .. 650 milliGRAM(s) Oral every 6 hours PRN  aluminum hydroxide/magnesium hydroxide/simethicone Suspension 30 milliLiter(s) Oral every 4 hours PRN  amLODIPine   Tablet 5 milliGRAM(s) Oral daily  apixaban 2.5 milliGRAM(s) Oral two times a day  atorvastatin 20 milliGRAM(s) Oral at bedtime  cefTRIAXone   IVPB 1000 milliGRAM(s) IV Intermittent every 24 hours  citalopram 20 milliGRAM(s) Oral daily  cyanocobalamin 1000 MICROGram(s) Oral daily  melatonin 3 milliGRAM(s) Oral at bedtime PRN  metoprolol succinate ER 25 milliGRAM(s) Oral daily  ondansetron Injectable 4 milliGRAM(s) IV Push every 8 hours PRN  sodium chloride 0.45% with potassium chloride 20 mEq/L 1000 milliLiter(s) IV Continuous <Continuous>      Review of Systems:  A 10-point review of systems was obtained.   Review of systems otherwise negative except as previously noted.    Allergies: No Known Allergies    For details regarding the patient's past medical history, social history, family history, and other miscellaneous elements, please refer the initial infectious diseases consultation and/or the admitting history and physical examination for this admission.    Objective:  Vitals:   T(C): 36.4 (04-09-24 @ 08:22), Max: 36.9 (04-08-24 @ 20:00)  HR: 88 (04-09-24 @ 08:00) (66 - 88)  BP: 158/111 (04-09-24 @ 08:00) (137/65 - 162/72)  RR: 22 (04-09-24 @ 08:00) (18 - 22)  SpO2: 95% (04-09-24 @ 08:00) (95% - 98%)    Physical Examination:  General: no acute distress  HEENT: NC/AT, EOMI,  Cardio: S1, S2 heard, RRR, no murmurs  Resp: decreased breath sounds  Abd: soft, NT, ND,   Ext: no edema or cyanosis  Skin: warm, dry, no visible rash      Laboratory Studies:  CBC:                       11.4   6.12  )-----------( 199      ( 09 Apr 2024 05:55 )             34.7     CMP: 04-09    143  |  108  |  5<L>  ----------------------------<  79  3.6   |  26  |  0.54    Ca    8.4      09 Apr 2024 05:55  Phos  2.9     04-09  Mg     1.7     04-09    TPro  5.4<L>  /  Alb  2.5<L>  /  TBili  0.5  /  DBili  x   /  AST  15  /  ALT  11  /  AlkPhos  58  04-08    LIVER FUNCTIONS - ( 08 Apr 2024 06:00 )  Alb: 2.5 g/dL / Pro: 5.4 g/dL / ALK PHOS: 58 U/L / ALT: 11 U/L / AST: 15 U/L / GGT: x           Urinalysis Basic - ( 09 Apr 2024 05:55 )    Color: x / Appearance: x / SG: x / pH: x  Gluc: 79 mg/dL / Ketone: x  / Bili: x / Urobili: x   Blood: x / Protein: x / Nitrite: x   Leuk Esterase: x / RBC: x / WBC x   Sq Epi: x / Non Sq Epi: x / Bacteria: x        Microbiology: reviewed    Culture - Blood (collected 04-06-24 @ 01:05)  Source: .Blood Blood-Peripheral  Preliminary Report (04-08-24 @ 13:02):    No growth at 48 Hours    Culture - Blood (collected 04-06-24 @ 01:05)  Source: .Blood Blood-Peripheral  Preliminary Report (04-08-24 @ 13:02):    No growth at 48 Hours    Culture - Urine (collected 04-06-24 @ 01:05)  Source: Clean Catch Clean Catch (Midstream)  Preliminary Report (04-07-24 @ 12:19):    >100,000 CFU/ml Escherichia coli    <10,000 CFU/ml Normal Urogenital grant present          Radiology: reviewed      
[facility], Division of Infectious Diseases  CHRISTY Tavarez Y. Patel, S. Shah, G. [facility]  [phone]    Name: JEN REICH  Age: 89y  Gender: Female  MRN: [id number]    Interval History:  Patient seen and examined at bedside  No acute overnight events. Afebrile  No complaints  Notes reviewed    Antibiotics:      Medications:  acetaminophen     Tablet .. 650 milliGRAM(s) Oral every 6 hours PRN  aluminum hydroxide/magnesium hydroxide/simethicone Suspension 30 milliLiter(s) Oral every 4 hours PRN  amLODIPine   Tablet 5 milliGRAM(s) Oral daily  apixaban 2.5 milliGRAM(s) Oral two times a day  atorvastatin 20 milliGRAM(s) Oral at bedtime  citalopram 20 milliGRAM(s) Oral daily  cyanocobalamin 1000 MICROGram(s) Oral daily  melatonin 3 milliGRAM(s) Oral at bedtime PRN  metoprolol succinate ER 25 milliGRAM(s) Oral daily  ondansetron Injectable 4 milliGRAM(s) IV Push every 8 hours PRN  pantoprazole    Tablet 40 milliGRAM(s) Oral before breakfast  sodium chloride 0.45% with potassium chloride 20 mEq/L 1000 milliLiter(s) IV Continuous <Continuous>      Review of Systems:  Review of systems otherwise negative except as previously noted.    Allergies: No Known Allergies    For details regarding the patient's past medical history, social history, family history, and other miscellaneous elements, please refer the initial infectious diseases consultation and/or the admitting history and physical examination for this admission.    Objective:  Vitals:   T(C): 36.6 (04-12-24 @ 07:49), Max: 36.6 (04-12-24 @ 07:49)  HR: 93 (04-12-24 @ 07:49) (89 - 104)  BP: 166/90 (04-12-24 @ 07:49) (134/79 - 166/90)  RR: 18 (04-12-24 @ 07:49) (17 - 18)  SpO2: 91% (04-12-24 @ 07:49) (91% - 97%)    Physical Examination:  General: no acute distress  HEENT: NC/AT, EOMI,  Cardio: RRR  Resp: symmetric chest rise  Abd: soft, NT, ND  Ext: no edema or cyanosis  Skin: warm, dry, no visible rash      Laboratory Studies:  CBC:                       12.4   8.14  )-----------( 240      ( 12 Apr 2024 06:30 )             39.1     CMP: 04-12    143  |  110<H>  |  5<L>  ----------------------------<  98  4.8   |  25  |  0.44<L>    Ca    9.3      12 Apr 2024 06:30  Phos  3.7     04-12  Mg     1.8     04-12    TPro  6.2  /  Alb  3.0<L>  /  TBili  0.6  /  DBili  x   /  AST  29  /  ALT  18  /  AlkPhos  73  04-11    LIVER FUNCTIONS - ( 11 Apr 2024 06:00 )  Alb: 3.0 g/dL / Pro: 6.2 g/dL / ALK PHOS: 73 U/L / ALT: 18 U/L / AST: 29 U/L / GGT: x           Urinalysis Basic - ( 12 Apr 2024 06:30 )    Color: x / Appearance: x / SG: x / pH: x  Gluc: 98 mg/dL / Ketone: x  / Bili: x / Urobili: x   Blood: x / Protein: x / Nitrite: x   Leuk Esterase: x / RBC: x / WBC x   Sq Epi: x / Non Sq Epi: x / Bacteria: x        Microbiology: reviewed    Culture - Blood (collected 04-06-24 @ 01:05)  Source: .Blood Blood-Peripheral  Final Report (04-11-24 @ 13:01):    No growth at 5 days    Culture - Blood (collected 04-06-24 @ 01:05)  Source: .Blood Blood-Peripheral  Final Report (04-11-24 @ 13:01):    No growth at 5 days    Culture - Urine (collected 04-06-24 @ 01:05)  Source: Clean Catch Clean Catch (Midstream)  Final Report (04-09-24 @ 15:18):    >100,000 CFU/ml Escherichia coli    <10,000 CFU/ml Normal Urogenital grant present  Organism: Escherichia coli (04-09-24 @ 15:18)  Organism: Escherichia coli (04-09-24 @ 15:18)      Method Type: CHANTELL      -  Amoxicillin/Clavulanic Acid: S <=8/4      -  Ampicillin: R >16 These ampicillin results predict results for amoxicillin      -  Ampicillin/Sulbactam: I 16/8      -  Aztreonam: S <=4      -  Cefazolin: S <=2 For uncomplicated UTI with K. pneumoniae, E. coli, or P. mirablis: CHANTELL <=16 is sensitive and CHANTELL >=32 is resistant. This also predicts results for oral agents cefaclor, cefdinir, cefpodoxime, cefprozil, cefuroxime axetil, cephalexin and locarbef for uncomplicated UTI. Note that some isolates may be susceptible to these agents while testing resistant to cefazolin.      -  Cefepime: S <=2      -  Cefoxitin: S <=8      -  Ceftriaxone: S <=1      -  Cefuroxime: S <=4      -  Ciprofloxacin: I 0.5      -  Ertapenem: S <=0.5      -  Gentamicin: S <=2      -  Imipenem: S <=1      -  Levofloxacin: S <=0.5      -  Meropenem: S <=1      -  Nitrofurantoin: S <=32 Should not be used to treat pyelonephritis      -  Piperacillin/Tazobactam: S <=8      -  Tobramycin: S <=2      -  Trimethoprim/Sulfamethoxazole: R >2/38          Radiology: reviewed      
Subjective: Pt is doing well today, tolerating diet, and has no complaints. Pending sensitivities from urine culture.    MEDICATIONS  (STANDING):  amLODIPine   Tablet 5 milliGRAM(s) Oral daily  apixaban 2.5 milliGRAM(s) Oral two times a day  atorvastatin 20 milliGRAM(s) Oral at bedtime  cefTRIAXone   IVPB 1000 milliGRAM(s) IV Intermittent every 24 hours  citalopram 20 milliGRAM(s) Oral daily  cyanocobalamin 1000 MICROGram(s) Oral daily  metoprolol succinate ER 25 milliGRAM(s) Oral daily  sodium chloride 0.45% with potassium chloride 20 mEq/L 1000 milliLiter(s) (75 mL/Hr) IV Continuous <Continuous>    MEDICATIONS  (PRN):  acetaminophen     Tablet .. 650 milliGRAM(s) Oral every 6 hours PRN Temp greater or equal to 38C (100.4F), Mild Pain (1 - 3)  aluminum hydroxide/magnesium hydroxide/simethicone Suspension 30 milliLiter(s) Oral every 4 hours PRN Dyspepsia  melatonin 3 milliGRAM(s) Oral at bedtime PRN Insomnia  ondansetron Injectable 4 milliGRAM(s) IV Push every 8 hours PRN Nausea and/or Vomiting      Allergies    No Known Allergies    Intolerances        Vital Signs Last 24 Hrs  T(C): 36.3 (09 Apr 2024 12:51), Max: 36.9 (08 Apr 2024 20:00)  T(F): 97.4 (09 Apr 2024 12:51), Max: 98.5 (08 Apr 2024 20:00)  HR: 82 (09 Apr 2024 12:00) (66 - 110)  BP: 140/80 (09 Apr 2024 12:00) (112/87 - 162/72)  BP(mean): 96 (09 Apr 2024 12:00) (86 - 123)  RR: 23 (09 Apr 2024 12:00) (18 - 23)  SpO2: 95% (09 Apr 2024 12:00) (95% - 98%)    Parameters below as of 09 Apr 2024 04:00  Patient On (Oxygen Delivery Method): room air        PHYSICAL EXAM:  ENERAL: NAD, well-groomed, well-developed  HEAD:  Atraumatic, Normocephalic  ENMT: Moist mucous membranes,   NECK: Supple, No JVD, Normal thyroid  NERVOUS SYSTEM:  All 4 extremities mobile, no gross sensory deficits.   CHEST/LUNG: Clear to auscultation bilaterally; No rales, rhonchi, wheezing, or rubs  HEART: irregular irregular rhythm, No murmurs, rubs, or gallops  ABDOMEN: Soft, Nontender, Nondistended; Bowel sounds present  EXTREMITIES:  2+ Peripheral Pulses, No clubbing, cyanosis, or edema      LABS:                        11.4   6.12  )-----------( 199      ( 09 Apr 2024 05:55 )             34.7     09 Apr 2024 05:55    143    |  108    |  5      ----------------------------<  79     3.6     |  26     |  0.54     Ca    8.4        09 Apr 2024 05:55  Phos  2.9       09 Apr 2024 05:55  Mg     1.7       09 Apr 2024 05:55          
Patient is a 89y old  Female who presents with a chief complaint of AMS (06 Apr 2024 11:45)        HPI:  89 y/o female with PMH of TIA, Afib on eliquis, dementia, UTI who presented to ED to be evaluated for AMS. pt lives home with her family and gets around by means of walker. about 20 days ago pt noted to have visual hallucination. per daughter pt saw little girl in room. pt was taken to her PCP as UTI had led to such symptoms in past. pt was placed on 10 days of keflex with improvement. pt had F/u appt with her PCP about 1 week ago where everything including urine cx was reported to be normal. in last 2 days pt became more confused. trying to get out of bed at night on her own and starts walking w/o walker. per daughter pt have been more weak and lethargy during day time. pt has home health visiting 6 days/week only during daytime. daughter thinks that pt is still having UTI and was BIBA.  pt seems to ne pleasantly confused upon my initial evaluation. currently A and O X 3. pt does not recal any visual or auditory hallucination pt is not sure why she is here  denies any abdominal pain, nausea,vomiting, fever,chills or any urinary symptoms     pt found to have UTI in ED for which she is to be admitted       (06 Apr 2024 02:16)      SUBJECTIVE & OBJECTIVE: Pt seen and examined at bedside. nad    PHYSICAL EXAM:  T(C): 36.3 (04-07-24 @ 07:50), Max: 36.6 (04-06-24 @ 20:07)  HR: 85 (04-07-24 @ 07:50) (75 - 87)  BP: 135/60 (04-07-24 @ 07:50) (135/60 - 148/64)  RR: 19 (04-07-24 @ 07:50) (17 - 19)  SpO2: 94% (04-07-24 @ 07:50) (94% - 95%)  Wt(kg): --   GENERAL: NAD, well-groomed, well-developed  HEAD:  Atraumatic, Normocephalic  EYES: EOMI, PERRLA, conjunctiva and sclera clear  ENMT: Moist mucous membranes  NECK: Supple, No JVD  NERVOUS SYSTEM:  Alert & Oriented X3, Motor Strength 5/5 B/L upper and lower extremities; DTRs 2+ intact and symmetric  CHEST/LUNG: Clear to auscultation bilaterally; No rales, rhonchi, wheezing, or rubs  HEART: Regular rate and rhythm; No murmurs, rubs, or gallops  ABDOMEN: Soft, Nontender, Nondistended; Bowel sounds present  EXTREMITIES:  2+ Peripheral Pulses, No clubbing, cyanosis, or edema        MEDICATIONS  (STANDING):  apixaban 2.5 milliGRAM(s) Oral two times a day  atorvastatin 20 milliGRAM(s) Oral at bedtime  cefTRIAXone   IVPB 1000 milliGRAM(s) IV Intermittent every 24 hours  citalopram 20 milliGRAM(s) Oral daily  cyanocobalamin 1000 MICROGram(s) Oral daily  metoprolol succinate ER 25 milliGRAM(s) Oral daily  sodium chloride 0.9%. 1000 milliLiter(s) (84 mL/Hr) IV Continuous <Continuous>    MEDICATIONS  (PRN):  acetaminophen     Tablet .. 650 milliGRAM(s) Oral every 6 hours PRN Temp greater or equal to 38C (100.4F), Mild Pain (1 - 3)  aluminum hydroxide/magnesium hydroxide/simethicone Suspension 30 milliLiter(s) Oral every 4 hours PRN Dyspepsia  melatonin 3 milliGRAM(s) Oral at bedtime PRN Insomnia  ondansetron Injectable 4 milliGRAM(s) IV Push every 8 hours PRN Nausea and/or Vomiting      LABS:                        11.8   7.02  )-----------( 198      ( 07 Apr 2024 05:45 )             37.7     04-07    139  |  108  |  12  ----------------------------<  78  3.7   |  24  |  0.53    Ca    8.2<L>      07 Apr 2024 05:45    TPro  5.4<L>  /  Alb  2.7<L>  /  TBili  0.4  /  DBili  x   /  AST  16  /  ALT  20  /  AlkPhos  59  04-07    PT/INR - ( 06 Apr 2024 01:05 )   PT: 15.7 sec;   INR: 1.46 ratio         PTT - ( 06 Apr 2024 01:05 )  PTT:36.9 sec  Urinalysis Basic - ( 07 Apr 2024 05:45 )    Color: x / Appearance: x / SG: x / pH: x  Gluc: 78 mg/dL / Ketone: x  / Bili: x / Urobili: x   Blood: x / Protein: x / Nitrite: x   Leuk Esterase: x / RBC: x / WBC x   Sq Epi: x / Non Sq Epi: x / Bacteria: x        CAPILLARY BLOOD GLUCOSE          CAPILLARY BLOOD GLUCOSE        CAPILLARY BLOOD GLUCOSE                RECENT CULTURES:      RADIOLOGY & ADDITIONAL TESTS:                        DVT/GI ppx  Discussed with pt @ bedside
Patient is a 89y old  Female who presents with a chief complaint of AMS (12 Apr 2024 15:01)        HPI:  89 y/o female with PMH of TIA, Afib on eliquis, dementia, UTI who presented to ED to be evaluated for AMS. pt lives home with her family and gets around by means of walker. about 20 days ago pt noted to have visual hallucination. per daughter pt saw little girl in room. pt was taken to her PCP as UTI had led to such symptoms in past. pt was placed on 10 days of keflex with improvement. pt had F/u appt with her PCP about 1 week ago where everything including urine cx was reported to be normal. in last 2 days pt became more confused. trying to get out of bed at night on her own and starts walking w/o walker. per daughter pt have been more weak and lethargy during day time. pt has home health visiting 6 days/week only during daytime. daughter thinks that pt is still having UTI and was BIBA.  pt seems to ne pleasantly confused upon my initial evaluation. currently A and O X 3. pt does not recal any visual or auditory hallucination pt is not sure why she is here  denies any abdominal pain, nausea,vomiting, fever,chills or any urinary symptoms     pt found to have UTI in ED for which she is to be admitted       (06 Apr 2024 02:16)      SUBJECTIVE & OBJECTIVE: Pt seen and examined at bedside. nad    PHYSICAL EXAM:  T(C): 36.7 (04-13-24 @ 08:07), Max: 36.7 (04-13-24 @ 08:07)  HR: 138 (04-13-24 @ 03:25) (95 - 138)  BP: 151/85 (04-13-24 @ 08:07) (108/70 - 151/85)  RR: 19 (04-13-24 @ 03:25) (19 - 20)  SpO2: 99% (04-13-24 @ 03:25) (99% - 99%)  Wt(kg): --   GENERAL: NAD, well-groomed, well-developed  HEAD:  Atraumatic, Normocephalic  EYES: EOMI, PERRLA, conjunctiva and sclera clear  ENMT: Moist mucous membranes  NECK: Supple, No JVD  NERVOUS SYSTEM:  Alert & Oriented X3, Motor Strength 5/5 B/L upper and lower extremities; DTRs 2+ intact and symmetric  CHEST/LUNG: Clear to auscultation bilaterally; No rales, rhonchi, wheezing, or rubs  HEART: Regular rate and rhythm; No murmurs, rubs, or gallops  ABDOMEN: Soft, Nontender, Nondistended; Bowel sounds present  EXTREMITIES:  2+ Peripheral Pulses, No clubbing, cyanosis, or edema        MEDICATIONS  (STANDING):  amLODIPine   Tablet 5 milliGRAM(s) Oral daily  apixaban 2.5 milliGRAM(s) Oral two times a day  atorvastatin 20 milliGRAM(s) Oral at bedtime  citalopram 20 milliGRAM(s) Oral daily  cyanocobalamin 1000 MICROGram(s) Oral daily  lactated ringers. 1000 milliLiter(s) (125 mL/Hr) IV Continuous <Continuous>  metoprolol succinate ER 25 milliGRAM(s) Oral daily  mirtazapine 15 milliGRAM(s) Oral at bedtime  pantoprazole    Tablet 40 milliGRAM(s) Oral before breakfast    MEDICATIONS  (PRN):  acetaminophen     Tablet .. 650 milliGRAM(s) Oral every 6 hours PRN Temp greater or equal to 38C (100.4F), Mild Pain (1 - 3)  aluminum hydroxide/magnesium hydroxide/simethicone Suspension 30 milliLiter(s) Oral every 4 hours PRN Dyspepsia  melatonin 3 milliGRAM(s) Oral at bedtime PRN Insomnia  ondansetron Injectable 4 milliGRAM(s) IV Push every 8 hours PRN Nausea and/or Vomiting      LABS:                        12.6   7.65  )-----------( 254      ( 13 Apr 2024 06:00 )             39.2     04-13    145  |  108  |  6<L>  ----------------------------<  86  4.4   |  29  |  0.51    Ca    9.4      13 Apr 2024 06:00  Phos  3.7     04-12  Mg     1.7     04-13        Urinalysis Basic - ( 13 Apr 2024 06:00 )    Color: x / Appearance: x / SG: x / pH: x  Gluc: 86 mg/dL / Ketone: x  / Bili: x / Urobili: x   Blood: x / Protein: x / Nitrite: x   Leuk Esterase: x / RBC: x / WBC x   Sq Epi: x / Non Sq Epi: x / Bacteria: x      Magnesium: 1.7 mg/dL (04-13 @ 06:00)    CAPILLARY BLOOD GLUCOSE          CAPILLARY BLOOD GLUCOSE        CAPILLARY BLOOD GLUCOSE      POCT Blood Glucose.: 97 mg/dL (11 Apr 2024 14:18)      CARDIAC MARKERS ( 12 Apr 2024 18:50 )  x     / x     / 65 U/L / x     / x            RECENT CULTURES:      RADIOLOGY & ADDITIONAL TESTS:                        DVT/GI ppx  Discussed with pt @ bedside
Patient is a 89y old  Female who presents with a chief complaint of AMS (13 Apr 2024 13:00)        HPI:  89 y/o female with PMH of TIA, Afib on eliquis, dementia, UTI who presented to ED to be evaluated for AMS. pt lives home with her family and gets around by means of walker. about 20 days ago pt noted to have visual hallucination. per daughter pt saw little girl in room. pt was taken to her PCP as UTI had led to such symptoms in past. pt was placed on 10 days of keflex with improvement. pt had F/u appt with her PCP about 1 week ago where everything including urine cx was reported to be normal. in last 2 days pt became more confused. trying to get out of bed at night on her own and starts walking w/o walker. per daughter pt have been more weak and lethargy during day time. pt has home health visiting 6 days/week only during daytime. daughter thinks that pt is still having UTI and was BIBA.  pt seems to ne pleasantly confused upon my initial evaluation. currently A and O X 3. pt does not recal any visual or auditory hallucination pt is not sure why she is here  denies any abdominal pain, nausea,vomiting, fever,chills or any urinary symptoms     pt found to have UTI in ED for which she is to be admitted       (06 Apr 2024 02:16)      SUBJECTIVE & OBJECTIVE: Pt seen and examined at bedside. nad    PHYSICAL EXAM:  T(C): 36.9 (04-14-24 @ 07:54), Max: 36.9 (04-14-24 @ 07:54)  HR: 114 (04-14-24 @ 07:54) (102 - 135)  BP: 128/91 (04-14-24 @ 07:54) (128/76 - 141/84)  RR: 16 (04-14-24 @ 07:54) (16 - 16)  SpO2: 98% (04-14-24 @ 07:54) (90% - 98%)  Wt(kg): --   GENERAL: NAD, well-groomed, well-developed  NECK: Supple, No JVD  NERVOUS SYSTEM:  Alert & Oriented X3, .  CHEST/LUNG: Clear to auscultation bilaterally; No rales, rhonchi, wheezing, or rubs  HEART: Regular rate and rhythm; No murmurs, rubs, or gallops  ABDOMEN: Soft, Nontender, Nondistended; Bowel sounds present  EXTREMITIES:  2+ Peripheral Pulses, No clubbing, cyanosis, or edema        MEDICATIONS  (STANDING):  amLODIPine   Tablet 5 milliGRAM(s) Oral daily  apixaban 2.5 milliGRAM(s) Oral two times a day  aspirin Suppository 300 milliGRAM(s) Rectal daily  atorvastatin 20 milliGRAM(s) Oral at bedtime  citalopram 20 milliGRAM(s) Oral daily  cyanocobalamin 1000 MICROGram(s) Oral daily  enoxaparin Injectable 30 milliGRAM(s) SubCutaneous every 24 hours  lactated ringers. 1000 milliLiter(s) (125 mL/Hr) IV Continuous <Continuous>  metoprolol succinate ER 25 milliGRAM(s) Oral daily  metoprolol tartrate Injectable 5 milliGRAM(s) IV Push every 6 hours  mirtazapine 15 milliGRAM(s) Oral at bedtime  pantoprazole  Injectable 40 milliGRAM(s) IV Push daily    MEDICATIONS  (PRN):  acetaminophen     Tablet .. 650 milliGRAM(s) Oral every 6 hours PRN Temp greater or equal to 38C (100.4F), Mild Pain (1 - 3)  aluminum hydroxide/magnesium hydroxide/simethicone Suspension 30 milliLiter(s) Oral every 4 hours PRN Dyspepsia  melatonin 3 milliGRAM(s) Oral at bedtime PRN Insomnia  ondansetron Injectable 4 milliGRAM(s) IV Push every 8 hours PRN Nausea and/or Vomiting      LABS:                        12.6   7.65  )-----------( 254      ( 13 Apr 2024 06:00 )             39.2     04-13    145  |  108  |  6<L>  ----------------------------<  86  4.4   |  29  |  0.51    Ca    9.4      13 Apr 2024 06:00  Mg     1.7     04-13        Urinalysis Basic - ( 13 Apr 2024 06:00 )    Color: x / Appearance: x / SG: x / pH: x  Gluc: 86 mg/dL / Ketone: x  / Bili: x / Urobili: x   Blood: x / Protein: x / Nitrite: x   Leuk Esterase: x / RBC: x / WBC x   Sq Epi: x / Non Sq Epi: x / Bacteria: x        CAPILLARY BLOOD GLUCOSE      POCT Blood Glucose.: 75 mg/dL (13 Apr 2024 18:54)  POCT Blood Glucose.: 77 mg/dL (13 Apr 2024 15:32)      CAPILLARY BLOOD GLUCOSE      POCT Blood Glucose.: 75 mg/dL (13 Apr 2024 18:54)  POCT Blood Glucose.: 77 mg/dL (13 Apr 2024 15:32)    CAPILLARY BLOOD GLUCOSE      POCT Blood Glucose.: 75 mg/dL (13 Apr 2024 18:54)      CARDIAC MARKERS ( 12 Apr 2024 18:50 )  x     / x     / 65 U/L / x     / x            RECENT CULTURES:      RADIOLOGY & ADDITIONAL TESTS:                        DVT/GI ppx  Discussed with pt @ bedside
[facility], Division of Infectious Diseases  CHRISTY Tavarez Y. Patel, S. Shah, [facility]  [phone]    Name: JEN REICH  Age: 89y  Gender: Female  MRN: [id number]    Interval History:  Patient seen and examined at bedside  No acute overnight events. Afebrile  No complaints  Notes reviewed    Antibiotics:  cefTRIAXone   IVPB 1000 milliGRAM(s) IV Intermittent every 24 hours      Medications:  acetaminophen     Tablet .. 650 milliGRAM(s) Oral every 6 hours PRN  aluminum hydroxide/magnesium hydroxide/simethicone Suspension 30 milliLiter(s) Oral every 4 hours PRN  amLODIPine   Tablet 5 milliGRAM(s) Oral daily  apixaban 2.5 milliGRAM(s) Oral two times a day  atorvastatin 20 milliGRAM(s) Oral at bedtime  cefTRIAXone   IVPB 1000 milliGRAM(s) IV Intermittent every 24 hours  citalopram 20 milliGRAM(s) Oral daily  cyanocobalamin 1000 MICROGram(s) Oral daily  melatonin 3 milliGRAM(s) Oral at bedtime PRN  metoprolol succinate ER 25 milliGRAM(s) Oral daily  ondansetron Injectable 4 milliGRAM(s) IV Push every 8 hours PRN  sodium chloride 0.45% with potassium chloride 20 mEq/L 1000 milliLiter(s) IV Continuous <Continuous>      Review of Systems:  A 10-point review of systems was obtained.   Review of systems otherwise negative except as previously noted.    Allergies: No Known Allergies    For details regarding the patient's past medical history, social history, family history, and other miscellaneous elements, please refer the initial infectious diseases consultation and/or the admitting history and physical examination for this admission.    Objective:  Vitals:   T(C): 36.7 (04-10-24 @ 08:03), Max: 36.8 (04-09-24 @ 20:43)  HR: 98 (04-10-24 @ 08:03) (73 - 137)  BP: 140/122 (04-10-24 @ 08:03) (140/80 - 159/99)  RR: 25 (04-10-24 @ 08:03) (20 - 38)  SpO2: 95% (04-10-24 @ 08:03) (93% - 98%)    Physical Examination:  General: no acute distress  HEENT: NC/AT, EOMI,  Cardio: RRR  Resp: decreased breath sounds  Abd: soft, NT, ND  Ext: no edema or cyanosis  Skin: warm, dry, no visible rash      Laboratory Studies:  CBC:                       12.4   7.77  )-----------( 216      ( 10 Apr 2024 06:00 )             37.1     CMP: 04-10    146<H>  |  110<H>  |  5<L>  ----------------------------<  81  3.8   |  26  |  0.55    Ca    8.9      10 Apr 2024 06:00  Phos  3.2     04-10  Mg     1.5     04-10        Urinalysis Basic - ( 10 Apr 2024 06:00 )    Color: x / Appearance: x / SG: x / pH: x  Gluc: 81 mg/dL / Ketone: x  / Bili: x / Urobili: x   Blood: x / Protein: x / Nitrite: x   Leuk Esterase: x / RBC: x / WBC x   Sq Epi: x / Non Sq Epi: x / Bacteria: x        Microbiology: reviewed    Culture - Blood (collected 04-06-24 @ 01:05)  Source: .Blood Blood-Peripheral  Preliminary Report (04-09-24 @ 13:02):    No growth at 72 Hours    Culture - Blood (collected 04-06-24 @ 01:05)  Source: .Blood Blood-Peripheral  Preliminary Report (04-09-24 @ 13:02):    No growth at 72 Hours    Culture - Urine (collected 04-06-24 @ 01:05)  Source: Clean Catch Clean Catch (Midstream)  Final Report (04-09-24 @ 15:18):    >100,000 CFU/ml Escherichia coli    <10,000 CFU/ml Normal Urogenital grant present  Organism: Escherichia coli (04-09-24 @ 15:18)  Organism: Escherichia coli (04-09-24 @ 15:18)      Method Type: CHANTELL      -  Amoxicillin/Clavulanic Acid: S <=8/4      -  Ampicillin: R >16 These ampicillin results predict results for amoxicillin      -  Ampicillin/Sulbactam: I 16/8      -  Aztreonam: S <=4      -  Cefazolin: S <=2 For uncomplicated UTI with K. pneumoniae, E. coli, or P. mirablis: CHANTELL <=16 is sensitive and CHANTELL >=32 is resistant. This also predicts results for oral agents cefaclor, cefdinir, cefpodoxime, cefprozil, cefuroxime axetil, cephalexin and locarbef for uncomplicated UTI. Note that some isolates may be susceptible to these agents while testing resistant to cefazolin.      -  Cefepime: S <=2      -  Cefoxitin: S <=8      -  Ceftriaxone: S <=1      -  Cefuroxime: S <=4      -  Ciprofloxacin: I 0.5      -  Ertapenem: S <=0.5      -  Gentamicin: S <=2      -  Imipenem: S <=1      -  Levofloxacin: S <=0.5      -  Meropenem: S <=1      -  Nitrofurantoin: S <=32 Should not be used to treat pyelonephritis      -  Piperacillin/Tazobactam: S <=8      -  Tobramycin: S <=2      -  Trimethoprim/Sulfamethoxazole: R >2/38          Radiology: reviewed      
Subjective: Patient seen and examined. No overnight events. Doing well.     MEDICATIONS  (STANDING):  amLODIPine   Tablet 5 milliGRAM(s) Oral daily  apixaban 2.5 milliGRAM(s) Oral two times a day  aspirin Suppository 300 milliGRAM(s) Rectal daily  atorvastatin 20 milliGRAM(s) Oral at bedtime  citalopram 20 milliGRAM(s) Oral daily  cyanocobalamin 1000 MICROGram(s) Oral daily  enoxaparin Injectable 30 milliGRAM(s) SubCutaneous every 24 hours  lactated ringers. 1000 milliLiter(s) (125 mL/Hr) IV Continuous <Continuous>  metoprolol tartrate 50 milliGRAM(s) Oral two times a day  mirtazapine 15 milliGRAM(s) Oral at bedtime  pantoprazole  Injectable 40 milliGRAM(s) IV Push daily    MEDICATIONS  (PRN):  acetaminophen     Tablet .. 650 milliGRAM(s) Oral every 6 hours PRN Temp greater or equal to 38C (100.4F), Mild Pain (1 - 3)  aluminum hydroxide/magnesium hydroxide/simethicone Suspension 30 milliLiter(s) Oral every 4 hours PRN Dyspepsia  melatonin 3 milliGRAM(s) Oral at bedtime PRN Insomnia  ondansetron Injectable 4 milliGRAM(s) IV Push every 8 hours PRN Nausea and/or Vomiting      Allergies    No Known Allergies    Intolerances        Vital Signs Last 24 Hrs  T(C): 36.6 (19 Apr 2024 08:18), Max: 36.9 (18 Apr 2024 15:18)  T(F): 97.8 (19 Apr 2024 08:18), Max: 98.5 (18 Apr 2024 15:18)  HR: 66 (19 Apr 2024 08:18) (66 - 99)  BP: 124/69 (19 Apr 2024 08:18) (103/54 - 141/67)  BP(mean): --  RR: 16 (19 Apr 2024 08:18) (16 - 17)  SpO2: 100% (19 Apr 2024 08:18) (97% - 100%)    Parameters below as of 19 Apr 2024 08:18  Patient On (Oxygen Delivery Method): room air        PHYSICAL EXAM:  GENERAL: NAD, well-groomed, well-developed  HEAD:  Atraumatic, Normocephalic  ENMT: Moist mucous membranes,   NECK: Supple, No JVD, Normal thyroid  NERVOUS SYSTEM:  All 4 extremities mobile, no gross sensory deficits.   CHEST/LUNG: Clear to auscultation bilaterally; No rales, rhonchi, wheezing, or rubs  HEART: Regular rate and rhythm; No murmurs, rubs, or gallops  ABDOMEN: Soft, Nontender, Nondistended; Bowel sounds present  EXTREMITIES:  2+ Peripheral Pulses, No clubbing, cyanosis, or edema      LABS:              CAPILLARY BLOOD GLUCOSE          RADIOLOGY & ADDITIONAL TESTS:    Imaging Personally Reviewed:  [ ] YES     Consultant(s) Notes Reviewed:      Care Discussed with Consultants/Other Providers:    Advanced Directives: [ ] DNR  [ ] No feeding tube  [ ] MOLST in chart  [ ] MOLST completed today  [ ] Unknown  
Subjective: Patient seen and examined. No overnight events. Doing well. Still some what confused but overall improving. She has no complaints at this time and is in good spirits.     MEDICATIONS  (STANDING):  amLODIPine   Tablet 5 milliGRAM(s) Oral daily  apixaban 2.5 milliGRAM(s) Oral two times a day  atorvastatin 20 milliGRAM(s) Oral at bedtime  cefTRIAXone   IVPB 1000 milliGRAM(s) IV Intermittent every 24 hours  citalopram 20 milliGRAM(s) Oral daily  cyanocobalamin 1000 MICROGram(s) Oral daily  metoprolol succinate ER 25 milliGRAM(s) Oral daily  sodium chloride 0.45% with potassium chloride 20 mEq/L 1000 milliLiter(s) (75 mL/Hr) IV Continuous <Continuous>    MEDICATIONS  (PRN):  acetaminophen     Tablet .. 650 milliGRAM(s) Oral every 6 hours PRN Temp greater or equal to 38C (100.4F), Mild Pain (1 - 3)  aluminum hydroxide/magnesium hydroxide/simethicone Suspension 30 milliLiter(s) Oral every 4 hours PRN Dyspepsia  melatonin 3 milliGRAM(s) Oral at bedtime PRN Insomnia  ondansetron Injectable 4 milliGRAM(s) IV Push every 8 hours PRN Nausea and/or Vomiting      Allergies    No Known Allergies    Intolerances        Vital Signs Last 24 Hrs  T(C): 36.7 (10 Apr 2024 08:03), Max: 36.8 (09 Apr 2024 20:43)  T(F): 98.1 (10 Apr 2024 08:03), Max: 98.3 (09 Apr 2024 20:43)  HR: 98 (10 Apr 2024 08:03) (73 - 137)  BP: 140/122 (10 Apr 2024 08:03) (140/80 - 159/99)  BP(mean): 129 (10 Apr 2024 08:03) (82 - 129)  RR: 25 (10 Apr 2024 08:03) (20 - 38)  SpO2: 95% (10 Apr 2024 08:03) (93% - 98%)    Parameters below as of 10 Apr 2024 04:00  Patient On (Oxygen Delivery Method): room air        PHYSICAL EXAM:  GENERAL: NAD, well-groomed, well-developed  HEAD:  Atraumatic, Normocephalic  ENMT: Moist mucous membranes,   NECK: Supple, No JVD, Normal thyroid  NERVOUS SYSTEM:  All 4 extremities mobile, no gross sensory deficits.   CHEST/LUNG: Clear to auscultation bilaterally; No rales, rhonchi, wheezing, or rubs  HEART: Regular rate and rhythm; No murmurs, rubs, or gallops  ABDOMEN: Soft, Nontender, Nondistended; Bowel sounds present  EXTREMITIES:  2+ Peripheral Pulses, No clubbing, cyanosis, or edema      LABS:                        12.4   7.77  )-----------( 216      ( 10 Apr 2024 06:00 )             37.1     10 Apr 2024 06:00    146    |  110    |  5      ----------------------------<  81     3.8     |  26     |  0.55     Ca    8.9        10 Apr 2024 06:00  Phos  3.2       10 Apr 2024 06:00  Mg     1.5       10 Apr 2024 06:00        Urinalysis Basic - ( 10 Apr 2024 06:00 )    Color: x / Appearance: x / SG: x / pH: x  Gluc: 81 mg/dL / Ketone: x  / Bili: x / Urobili: x   Blood: x / Protein: x / Nitrite: x   Leuk Esterase: x / RBC: x / WBC x   Sq Epi: x / Non Sq Epi: x / Bacteria: x      CAPILLARY BLOOD GLUCOSE          RADIOLOGY & ADDITIONAL TESTS:    Imaging Personally Reviewed:  [ ] YES     Consultant(s) Notes Reviewed:      Care Discussed with Consultants/Other Providers:    Advanced Directives: [ ] DNR  [ ] No feeding tube  [ ] MOLST in chart  [ ] MOLST completed today  [ ] Unknown

## 2024-04-20 NOTE — DISCHARGE NOTE NURSING/CASE MANAGEMENT/SOCIAL WORK - NSDCPEFALRISK_GEN_ALL_CORE
For information on Fall & Injury Prevention, visit: [email or web address] OR  [email or web address] OR  https://www.cdc.gov/steadi/patient.html

## 2024-04-20 NOTE — DISCHARGE NOTE NURSING/CASE MANAGEMENT/SOCIAL WORK - PATIENT PORTAL LINK FT
You can access the FollowMyHealth Patient Portal offered by [facility] by registering at the following website: [email or web address]. By joining [employer]’s FollowMyHealth portal, you will also be able to view your health information using other applications (apps) compatible with our system.

## 2025-06-20 NOTE — PROGRESS NOTE ADULT - PROBLEM SELECTOR PLAN 3
Patient reports history of heart palpitations at home  Found to have new onset Atrial fibrillation/Atrial flutter during this admission   - currently converted to sinus rhythm HR 60s-80s with occasional PACs  - c/w metoprolol 50mg PO daily  - c/w eliquis 5mg q12h Cigarettes/Vaping/e-Cigarettes